# Patient Record
Sex: FEMALE | Race: WHITE | NOT HISPANIC OR LATINO | Employment: FULL TIME | URBAN - METROPOLITAN AREA
[De-identification: names, ages, dates, MRNs, and addresses within clinical notes are randomized per-mention and may not be internally consistent; named-entity substitution may affect disease eponyms.]

---

## 2017-01-04 ENCOUNTER — PATIENT MESSAGE (OUTPATIENT)
Dept: NEUROLOGY | Facility: CLINIC | Age: 37
End: 2017-01-04

## 2017-01-06 ENCOUNTER — OFFICE VISIT (OUTPATIENT)
Dept: NEUROLOGY | Facility: CLINIC | Age: 37
End: 2017-01-06
Payer: COMMERCIAL

## 2017-01-06 VITALS
WEIGHT: 139.75 LBS | HEART RATE: 60 BPM | BODY MASS INDEX: 28.17 KG/M2 | HEIGHT: 59 IN | DIASTOLIC BLOOD PRESSURE: 60 MMHG | SYSTOLIC BLOOD PRESSURE: 100 MMHG

## 2017-01-06 DIAGNOSIS — G43.009 MIGRAINE WITHOUT AURA AND WITHOUT STATUS MIGRAINOSUS, NOT INTRACTABLE: ICD-10-CM

## 2017-01-06 PROCEDURE — 99999 PR PBB SHADOW E&M-EST. PATIENT-LVL III: CPT | Mod: PBBFAC,,, | Performed by: NEUROMUSCULOSKELETAL MEDICINE & OMM

## 2017-01-06 PROCEDURE — 1159F MED LIST DOCD IN RCRD: CPT | Mod: S$GLB,,, | Performed by: NEUROMUSCULOSKELETAL MEDICINE & OMM

## 2017-01-06 PROCEDURE — 99203 OFFICE O/P NEW LOW 30 MIN: CPT | Mod: S$GLB,,, | Performed by: NEUROMUSCULOSKELETAL MEDICINE & OMM

## 2017-01-06 RX ORDER — SUMATRIPTAN SUCCINATE AND NAPROXEN SODIUM 85; 500 MG/1; MG/1
TABLET, FILM COATED ORAL
Refills: 3 | COMMUNITY
Start: 2016-12-13 | End: 2017-10-03

## 2017-01-06 RX ORDER — SUMATRIPTAN SUCCINATE 100 MG/1
100 TABLET ORAL
Qty: 10 TABLET | Refills: 3 | Status: SHIPPED | OUTPATIENT
Start: 2017-01-06 | End: 2017-09-12 | Stop reason: SDUPTHER

## 2017-01-06 RX ORDER — SUMATRIPTAN SUCCINATE 100 MG/1
100 TABLET ORAL
Qty: 10 TABLET | Refills: 3 | Status: SHIPPED | OUTPATIENT
Start: 2017-01-06 | End: 2017-01-06 | Stop reason: SDUPTHER

## 2017-01-06 NOTE — PROGRESS NOTES
History: Patient presents for follow-up for her migraine headaches.  Insurance will no longer cover Treximet, so she would like to have sumatriptan prescription and be able to add Aleve to it.  She typically takes half tablet of the Treximet.  We will give her the sumatriptan 100 mg which she can take half a tablet of in 1-1-1/2 Aleve with it.  Patient had been pregnant with no headaches for a while.  She has had increased frequency in the last 6 months with 2-3 headaches per month with good response to the Treximet.  She had tried that Cephaly  device but complains she doesn't have time to 2 minutes with a 2-year-old.    Previous note: 10-3-13: This is a 33-year-old white female who presents for followup for her migraine headaches. Patient was previously seen by Dr. Nicolle Chatman who is no longer here. Patient describes a 20 year history of migraine headaches. She describes right or left temporal pain radiating to the hand eye causing aching/deep pain in the eye radiating into the neck. Headache intensity is 5-6/10 associated with nausea, photophobia, sonophobia and lasting all day. The patient has no aura. She generally takes a quarter tablet of Treximet with relief in 30-60 minutes. She takes Cymbalta 30 mg and nortriptyline 25 mg h.s. for migraine prevention. Overall she has been doing quite well with this medication regimen and is here predominantly to establish with a new neurologist.  Gen. Appearance: Well-developed with no obvious deformities  Carotid arteries symmetrical pulses  Peripheral vascular shows symmetrical pulses with no obvious edema or tenderness  Neurological Exam:  Mental status-alert and oriented to person, place, and time; attention span and concentration is good. Fund of knowledge-patient is aware of current events and able to give detailed history of the current problem.recent and remote memory seems intact. Language function is normal with no evidence of aphasia     Cranial nerves:Visual  acuity to hand chart -normal; visual fields to confrontation normal;pupils were equal and reactive to light ; funduscopic examination was normal with sharp disc margins. external ocular movements were full with no nystagmus. Facial sensation to pinprick and corneal reflexes intact; Facial muscles were symmetrical. Hearing is unimpaired symmetrical finger rub; Tongue and palate movements were normal with swallowing unimpaired. Shoulder shrug was intact with good strength Speech was normal     Motor examination: Upper and Lower extremities - Normal and symmetrical;muscle tone was normal ; right-handed  Sensory examination:Upper and lower extremities - Pinprick and soft touch were normal and symmetrical. Vibration sense was normal at the toes for age 15-20 seconds  Deep tendon reflexes were 1-2+ symmetrical. Both plantar responses were flexor  Cerebellar examination upper: Normal finger to nose and rapid alternating movements  Gait: Steady with no ataxia; heel and toe walk normal  Romberg test: negative Tandem gait: Normal   Involuntary movements: Negative  Cervical examination: Full range of motion with no pain Cervical tenderness:negative  Lumbar examination: Low back tenderness-negative Sciatic notch tenderness-negative Straight leg raising test-negative     Impression:Migraine headache without aura  Recommendations/plan: Half tablet   Sumatriptan 100 mg +1 1/2 Aleve for headache.  Follow-up one year

## 2017-03-06 ENCOUNTER — OFFICE VISIT (OUTPATIENT)
Dept: OBSTETRICS AND GYNECOLOGY | Facility: CLINIC | Age: 37
End: 2017-03-06
Attending: OBSTETRICS & GYNECOLOGY
Payer: COMMERCIAL

## 2017-03-06 VITALS
WEIGHT: 138.44 LBS | SYSTOLIC BLOOD PRESSURE: 110 MMHG | BODY MASS INDEX: 27.91 KG/M2 | HEIGHT: 59 IN | DIASTOLIC BLOOD PRESSURE: 70 MMHG

## 2017-03-06 DIAGNOSIS — N76.0 ACUTE VAGINITIS: Primary | ICD-10-CM

## 2017-03-06 PROCEDURE — 99213 OFFICE O/P EST LOW 20 MIN: CPT | Mod: S$GLB,,, | Performed by: OBSTETRICS & GYNECOLOGY

## 2017-03-06 PROCEDURE — 1160F RVW MEDS BY RX/DR IN RCRD: CPT | Mod: S$GLB,,, | Performed by: OBSTETRICS & GYNECOLOGY

## 2017-03-06 PROCEDURE — 99999 PR PBB SHADOW E&M-EST. PATIENT-LVL II: CPT | Mod: PBBFAC,,, | Performed by: OBSTETRICS & GYNECOLOGY

## 2017-03-06 RX ORDER — ACETAMINOPHEN AND CODEINE PHOSPHATE 120; 12 MG/5ML; MG/5ML
1 SOLUTION ORAL DAILY
Qty: 90 TABLET | Refills: 1 | Status: SHIPPED | OUTPATIENT
Start: 2017-03-06 | End: 2017-06-28 | Stop reason: SDUPTHER

## 2017-03-06 RX ORDER — TERCONAZOLE 8 MG/G
1 CREAM VAGINAL NIGHTLY
Qty: 20 G | Refills: 0 | Status: SHIPPED | OUTPATIENT
Start: 2017-03-06 | End: 2017-03-13

## 2017-03-06 NOTE — PROGRESS NOTES
SUBJECTIVE:   36 y.o. female   for vaginal irritation. Patient's last menstrual period was 2017 (approximate)..  Reports vaginal itching started 2 days ago.  Has not yet used any OTC tx to manage.  Has had yeast and BV in the past.  Sexually active.  Uses Micronor for contraception.  Declines STD screen.          Past Medical History:   Diagnosis Date    Depression     Head ache     Kidney stones     Menarche     Age of onset 12    Sore throat      Past Surgical History:   Procedure Laterality Date    KIDNEY STONE SURGERY      WISDOM TOOTH EXTRACTION       Social History     Social History    Marital status: Single     Spouse name: N/A    Number of children: N/A    Years of education: N/A     Occupational History    Not on file.     Social History Main Topics    Smoking status: Never Smoker    Smokeless tobacco: Never Used    Alcohol use Yes      Comment: Rare    Drug use: No    Sexual activity: Yes     Partners: Male     Birth control/ protection: OCP     Other Topics Concern    Not on file     Social History Narrative    Works as     Single mom    Daughter: Akosua     Family History   Problem Relation Age of Onset    Hyperlipidemia Mother     Hyperlipidemia Father     Breast cancer Paternal Grandmother      Dx > 50 years    Colon cancer Paternal Grandmother     Ovarian cancer Neg Hx     Cancer Neg Hx      OB History    Para Term  AB SAB TAB Ectopic Multiple Living   1 1 1      0 1      # Outcome Date GA Lbr Karlos/2nd Weight Sex Delivery Anes PTL Lv   1 Term 10/27/14 37w5d 07:00 / 00:25 3.005 kg (6 lb 10 oz) F Vag-Spont None N Y            Current Outpatient Prescriptions   Medication Sig Dispense Refill    norethindrone (MICRONOR) 0.35 mg tablet Take 1 tablet (0.35 mg total) by mouth once daily. 90 tablet 1    PRENATAL VITS W-CA,FE,FA,<1MG, (PRENATAL VITAMIN ORAL) Take by mouth.      sertraline (ZOLOFT) 100 MG tablet Take 1 tablet by mouth once  "a day 30 tablet 3    TREXIMET  mg Tab TAKE 1 TABLET BY MOUTH ONCE AS NEEDED.  3    sumatriptan (IMITREX) 100 MG tablet Take 1 tablet (100 mg total) by mouth every 2 (two) hours as needed for Migraine. 10 tablet 3    terconazole (TERAZOL 3) 0.8 % vaginal cream Place 1 applicator vaginally every evening. 20 g 0    [DISCONTINUED] alprazolam (XANAX) 0.25 MG tablet Take 1 tablet (0.25 mg total) by mouth nightly as needed. 20 tablet 0     No current facility-administered medications for this visit.      Allergies: Percocet [oxycodone-acetaminophen]     ROS:  Constitutional: no weight loss, weight gain, fever, fatigue  Eyes:  No vision changes, glasses/contacts  ENT/Mouth: No ulcers, sinus problems, ears ringing, headache  Cardiovascular: No inability to lie flat, chest pain, exercise intolerance, swelling, heart palpitations  Respiratory: No wheezing, coughing blood, shortness of breath, or cough  Gastrointestinal: No diarrhea, bloody stool, nausea/vomiting, constipation, gas, hemorrhoids  Genitourinary: No blood in urine, painful urination, urgency of urination, frequency of urination, incomplete emptying, incontinence, abnormal bleeding, painful periods, heavy periods, vaginal discharge, vaginal odor, painful intercourse, sexual problems, bleeding after intercourse.  Musculoskeletal: No muscle weakness  Skin/Breast: No painful breasts, nipple discharge, masses, rash, ulcers  Neurological: No passing out, seizures, numbness, headache  Endocrine: No diabetes, hypothyroid, hyperthyroid, hot flashes, hair loss, abnormal hair growth, ance  Psychiatric: No depression, crying  Hematologic: No bruises, bleeding, swollen lymph nodes, anemia.      OBJECTIVE:   The patient appears well, alert, oriented x 3, in no distress.  /70 (BP Location: Left arm, Patient Position: Sitting, BP Method: Manual)  Ht 4' 11" (1.499 m)  Wt 62.8 kg (138 lb 7.2 oz)  LMP 02/06/2017 (Approximate)  Breastfeeding? Yes  BMI 27.96 " kg/m2  ABDOMEN: no hernias, masses, or hepatosplenomegaly  GENITALIA: normal external genitalia, +erythema, + discharge  URETHRA: normal urethra, normal urethral meatus  VAGINA: Normal  CERVIX: no lesions or cervical motion tenderness  UTERUS: normal size, contour, position, consistency, mobility, non-tender  ADNEXA: no mass, fullness, tenderness      ASSESSMENT:   1. Acute vaginitis         PLAN:   Orders Placed This Encounter    terconazole (TERAZOL 3) 0.8 % vaginal cream    norethindrone (MICRONOR) 0.35 mg tablet     Discussed vaginitis, presumed candida, and mgt with Terazol  Return to clinic prn

## 2017-03-06 NOTE — MR AVS SNAPSHOT
"    [facility] [address]  [address] [facility] [address]  Phone: [phone]                  Kelly Thomason   3/6/2017 10:45 AM   Office Visit    Description:  Female : 1980   Provider:  Sandi Short MD   Department:  [facility] [address]                To Do List           Goals (5 Years of Data)     None      [facility]     [facility] Care [facility] -  Assistance  Registered nurses in the [facility] provide clinical advisement, health education, appointment booking, and other advisory services.  Call for this free service at [phone].             Medications           Message regarding Medications     Verify the changes and/or additions to your medication regime listed below are the same as discussed with your clinician today.  If any of these changes or additions are incorrect, please notify your healthcare provider.             Verify that the below list of medications is an accurate representation of the medications you are currently taking.  If none reported, the list may be blank. If incorrect, please contact your healthcare provider. Carry this list with you in case of emergency.           Current Medications     norethindrone (MICRONOR) 0.35 mg tablet Take 1 tablet (0.35 mg total) by mouth once daily.    PRENATAL VITS W-CA,FE,FA,<1MG, (PRENATAL VITAMIN ORAL) Take by mouth.    sertraline (ZOLOFT) 100 MG tablet Take 1 tablet by mouth once a day    TREXIMET  mg Tab TAKE 1 TABLET BY MOUTH ONCE AS NEEDED.    sumatriptan (IMITREX) 100 MG tablet Take 1 tablet (100 mg total) by mouth every 2 (two) hours as needed for Migraine.           Clinical Reference Information           Your Vitals Were     BP Height Weight Last Period BMI    110/70 (BP Location: Left arm, Patient Position: Sitting, BP Method: Manual) 4' 11" (1.499 m) 62.8 kg (138 lb 7.2 oz) 2017 (Approximate) 27.96 kg/m2      Blood Pressure          Most Recent Value    " BP  110/70      Allergies as of 3/6/2017     Percocet [Oxycodone-acetaminophen]      Immunizations Administered on Date of Encounter - 3/6/2017     None      Language Assistance Services     ATTENTION: Language assistance services are available, free of charge. Please call [phone].      ATENCIÓN: Si habla arpita, tiene a perez disposición servicios gratuitos de asistencia lingüística. Llame al [phone].     CHÚ Ý: N?u b?n nói Ti?ng Vi?t, có các d?ch v? h? tr? ngôn ng? mi?n phí dành cho b?n. G?i s? [phone].         [facility] [address] complies with applicable Federal civil rights laws and does not discriminate on the basis of race, color, national origin, age, disability, or sex.

## 2017-06-28 RX ORDER — ACETAMINOPHEN AND CODEINE PHOSPHATE 120; 12 MG/5ML; MG/5ML
SOLUTION ORAL
Qty: 84 TABLET | Refills: 0 | Status: SHIPPED | OUTPATIENT
Start: 2017-06-28 | End: 2017-09-22 | Stop reason: SDUPTHER

## 2017-07-16 DIAGNOSIS — F41.9 ANXIETY: Primary | ICD-10-CM

## 2017-07-17 RX ORDER — SERTRALINE HYDROCHLORIDE 100 MG/1
TABLET, FILM COATED ORAL
Qty: 30 TABLET | Refills: 12 | Status: SHIPPED | OUTPATIENT
Start: 2017-07-17 | End: 2018-07-24 | Stop reason: SDUPTHER

## 2017-09-08 ENCOUNTER — TELEPHONE (OUTPATIENT)
Dept: INTERNAL MEDICINE | Facility: CLINIC | Age: 37
End: 2017-09-08

## 2017-09-08 NOTE — TELEPHONE ENCOUNTER
Spoke to pt informed pt that unfortunately no appt are available with Dr. Hurst today asked pt if she will like to be seen by another provider informed me no that she will wait for an cancellation or wait till her appt on 9/15

## 2017-09-08 NOTE — TELEPHONE ENCOUNTER
----- Message from Carri Galarza sent at 9/8/2017  9:20 AM CDT -----  Contact: self  Patient states has itches bumps on arm, leg, stomach patient has appointment for 9/15/2017   Pt would like appointment for today or Monday   Please call pt at [phone]

## 2017-09-14 RX ORDER — SUMATRIPTAN SUCCINATE 100 MG/1
TABLET ORAL
Qty: 10 TABLET | Refills: 3 | Status: SHIPPED | OUTPATIENT
Start: 2017-09-14 | End: 2018-03-14 | Stop reason: SDUPTHER

## 2017-09-18 RX ORDER — NORETHINDRONE 0.35 MG
KIT ORAL
Qty: 84 TABLET | OUTPATIENT
Start: 2017-09-18

## 2017-09-22 RX ORDER — NORETHINDRONE 0.35 MG
KIT ORAL
Qty: 84 TABLET | Refills: 0 | Status: SHIPPED | OUTPATIENT
Start: 2017-09-22 | End: 2017-11-06

## 2017-10-03 ENCOUNTER — OFFICE VISIT (OUTPATIENT)
Dept: INTERNAL MEDICINE | Facility: CLINIC | Age: 37
End: 2017-10-03
Payer: COMMERCIAL

## 2017-10-03 ENCOUNTER — PATIENT MESSAGE (OUTPATIENT)
Dept: INTERNAL MEDICINE | Facility: CLINIC | Age: 37
End: 2017-10-03

## 2017-10-03 VITALS
DIASTOLIC BLOOD PRESSURE: 82 MMHG | SYSTOLIC BLOOD PRESSURE: 118 MMHG | HEART RATE: 86 BPM | HEIGHT: 59 IN | WEIGHT: 147.06 LBS | BODY MASS INDEX: 29.65 KG/M2

## 2017-10-03 DIAGNOSIS — L50.9 URTICARIA: Primary | ICD-10-CM

## 2017-10-03 PROCEDURE — 99213 OFFICE O/P EST LOW 20 MIN: CPT | Mod: S$GLB,,, | Performed by: PHYSICIAN ASSISTANT

## 2017-10-03 PROCEDURE — 99999 PR PBB SHADOW E&M-EST. PATIENT-LVL IV: CPT | Mod: PBBFAC,,, | Performed by: PHYSICIAN ASSISTANT

## 2017-10-03 RX ORDER — CETIRIZINE HYDROCHLORIDE 10 MG/1
10 TABLET ORAL DAILY
Refills: 0 | COMMUNITY
Start: 2017-10-03 | End: 2018-08-27

## 2017-10-03 NOTE — PROGRESS NOTES
"Subjective:       Patient ID: Kelly Thomason is a 37 y.o. female.        Chief Complaint: Urticaria (x1mnth off in on itchy)    Kelly Thomason is an established patient of Christiano Hurst MD here today for urgent care visit.    Rash-raised "whelps" different areas of body that vary in location and are very pruritic.  The lesions resolve within 24 hours.  Does not currently have any lesions.  This started about a month ago.  She applies cortisone cream, calamine lotion, and benadryl.  The only change she can note is that she recently stopped breastfeeding.  Stable on Zoloft for anxiety/depression.  On progesterone only contraceptive.  Occasionally takes Imitrex.  No chest pain, shortness of breath, lip swelling.    Answers for HPI/ROS submitted by the patient on 9/27/2017   Rash  Chronicity: new  Onset: more than 1 month ago  Progression since onset: waxing and waning  Affected locations: diffuse  Characteristics: itchiness  Exposed to: nothing  anorexia: No  cough: No  diarrhea: No  eye pain: No  facial edema: No  fatigue: No  fever: No  joint pain: No  nail changes: No  rhinorrhea: No  shortness of breath: No  sore throat: No  vomiting: No  Treatments tried: anti-itch cream, antihistamine, cold compress, moisturizer  Improvement on treatment: mild  asthma: No  allergies: No  eczema: No  varicella: Yes        Rash   This is a new problem. The current episode started more than 1 month ago. The problem has been waxing and waning since onset. The rash isdiffuse. The rash is characterized by itchiness. She was exposed to nothing. Pertinent negatives include no anorexia, congestion, cough, diarrhea, eye pain, facial edema, fatigue, fever, joint pain, nail changes, rhinorrhea, shortness of breath, sore throat or vomiting. Past treatments include anti-itch cream, antihistamine, cold compress and moisturizer. The treatment provided mild relief. Her past medical history is significant for varicella. There is no history " of allergies, asthma or eczema.        Review of Systems   Constitutional: Negative for chills, diaphoresis, fatigue and fever.   HENT: Negative for congestion, rhinorrhea and sore throat.    Eyes: Negative for pain and visual disturbance.   Respiratory: Negative for cough, chest tightness and shortness of breath.    Cardiovascular: Negative for chest pain, palpitations and leg swelling.   Gastrointestinal: Negative for abdominal pain, anorexia, blood in stool, constipation, diarrhea, nausea and vomiting.   Genitourinary: Negative for dysuria, frequency, hematuria and urgency.   Musculoskeletal: Negative for arthralgias, back pain and joint pain.   Skin: Positive for rash. Negative for nail changes.   Neurological: Negative for dizziness, syncope, weakness and headaches.   Psychiatric/Behavioral: Negative for dysphoric mood and sleep disturbance. The patient is not nervous/anxious.        Objective:      Physical Exam   Constitutional: She appears well-developed and well-nourished. No distress.   HENT:   Head: Normocephalic and atraumatic.   Right Ear: Tympanic membrane and external ear normal.   Left Ear: Tympanic membrane and external ear normal.   Nose: Nose normal.   Mouth/Throat: Oropharynx is clear and moist.   Eyes: Conjunctivae are normal. Pupils are equal, round, and reactive to light.   Cardiovascular: Normal rate, regular rhythm and normal heart sounds.  Exam reveals no gallop.    No murmur heard.  Pulmonary/Chest: Effort normal and breath sounds normal. No respiratory distress.   Abdominal: Soft. Normal appearance. There is no tenderness. There is no rebound, no guarding and no CVA tenderness.   Musculoskeletal: She exhibits no edema.   Neurological: She is alert.   Skin: Skin is warm and dry. She is not diaphoretic.   Psychiatric: She has a normal mood and affect.   Nursing note and vitals reviewed.      Assessment:       1. Urticaria        Plan:        was seen today for urticaria.    Diagnoses  "and all orders for this visit:    Urticaria  -     Ambulatory consult to Allergy  -     cetirizine (ZYRTEC) 10 MG tablet; Take 1 tablet (10 mg total) by mouth once daily.    No rash visible today.  Sounds consistent with hives.  Zyrtec 10 mg nightly and consult to Dr. Choi.    Pt has been given instructions populated from [employer] database and has verbalized understanding of the after visit summary and information contained wherein.    Follow up with a primary care provider. May go to ER for acute shortness of breath, lightheadedness, fever, or any other emergent complaints or changes in condition.    "This note will be shared with the patient"    Future Appointments  Date Time Provider Department Center   10/20/2017 9:00 AM Sandi Short MD [facility] [place]   11/3/2017 8:20 AM TONY Choi III, MD [facility]               "

## 2017-10-03 NOTE — PATIENT INSTRUCTIONS
Hives (Adult)  Hives are pink or red bumps on the skin. These bumps are also known as wheals. The bumps can itch, burn, or sting. Hives can occur anywhere on the body. They vary in size and shape and can form in clusters. Individual hives can appear and go away quickly. New hives may develop as old ones fade. Hives are common and usually harmless. Occasionally hives are a sign of a serious allergy.  Hives are often caused by an allergic reaction. It may be an allergic reaction to foods such as fruit, shellfish, chocolate, nuts, or tomatoes. It may be a reaction to pollens, animal fur, or mold spores. Medicines, chemicals, and insect bites can also cause hives. And hives can be caused by hot sun or cold air. The cause of hives can be difficult to find.  You may be given medicines to relieve swelling and itching. Follow all instructions when using these medicines. The hives will usually fade in a few days, but can last up to 2 weeks.  Home care  Follow these tips:  · Try to find the cause of the hives and eliminate it. Discuss possible causes with your healthcare provider. Future reactions to the same allergen may be worse.  · Dont scratch the hives. Scratching will delay healing. To reduce itching, apply cool, wet compresses to the skin.  · Dress in soft, loose cotton clothing.  · Dont bathe in hot water. This can make the itching worse.  · Apply an ice pack or cool pack wrapped in a thin towel to your skin. This will help reduce redness and itching. But if your hives were caused by exposure to cold, then do not apply more cold to them.  · You may use over-the counter antihistamines to reduce itching. Some older antihistamines, such as diphenhydramine and chlorpheniramine, are inexpensive. But they need to be taken often and may make you sleepy. They are best used at bedtime. Dont use diphenhydramine if you have glaucoma or have trouble urinating because of an enlarged prostate. Newer antihistamines, such as  loratadine, cetirizine, and fexofenadine, are generally more expensive. But they tend to have fewer side effects, such as drowsiness. They can be taken less often.  · Another type of antihistamine is used to treat heartburn. This type includes ranitidine, nizatidine, famotidine, and cimetidine. These are sometimes used along with the above antihistamines if a single medicine is not working.  Follow-up care  Follow up with your healthcare provider if your symptoms don't get better in 2 days. Ask your provider about allergy testing if you have had a severe reaction, or have had several episodes of hives. He or she can use the allergy testing to find out what you are allergic to.  When to seek medical advice  Call your healthcare provider right away if any of these occur:  · Fever of 100.4°F (38.0°C) or higher, or as directed by your healthcare provider  · Redness, swelling, or pain  · Foul-smelling fluid coming from the rash  Call 911  Call 911 if any of the following occur:  · Swelling of the face, throat, or tongue  · Trouble breathing or swallowing  · Dizziness, weakness, or fainting  Date Last Reviewed: 9/1/2016  © 8402-2675 [employer]. [address] [place] [address], [place] [address]. All rights reserved. This information is not intended as a substitute for professional medical care. Always follow your healthcare professional's instructions.      Dr. Choi

## 2017-10-26 ENCOUNTER — TELEPHONE (OUTPATIENT)
Dept: ALLERGY | Facility: CLINIC | Age: 37
End: 2017-10-26

## 2017-10-30 ENCOUNTER — OFFICE VISIT (OUTPATIENT)
Dept: ALLERGY | Facility: CLINIC | Age: 37
End: 2017-10-30
Payer: COMMERCIAL

## 2017-10-30 VITALS
HEART RATE: 96 BPM | BODY MASS INDEX: 30.58 KG/M2 | DIASTOLIC BLOOD PRESSURE: 88 MMHG | SYSTOLIC BLOOD PRESSURE: 130 MMHG | HEIGHT: 59 IN | WEIGHT: 151.69 LBS

## 2017-10-30 DIAGNOSIS — L50.1 IDIOPATHIC URTICARIA: Primary | ICD-10-CM

## 2017-10-30 DIAGNOSIS — J31.0 CHRONIC RHINITIS, UNSPECIFIED TYPE: ICD-10-CM

## 2017-10-30 PROCEDURE — 99244 OFF/OP CNSLTJ NEW/EST MOD 40: CPT | Mod: S$GLB,,, | Performed by: ALLERGY & IMMUNOLOGY

## 2017-10-30 PROCEDURE — 99999 PR PBB SHADOW E&M-EST. PATIENT-LVL III: CPT | Mod: PBBFAC,,, | Performed by: ALLERGY & IMMUNOLOGY

## 2017-10-30 RX ORDER — FERROUS SULFATE 325(65) MG
325 TABLET ORAL
COMMUNITY
End: 2018-06-04

## 2017-10-30 NOTE — PROGRESS NOTES
Kelly Thomason is referred by OMER Walker for a consult regarding urticaria. She is here alone.    About 3 months ago she started having urticarial lesions that are red, raised, and pruritic. She was having them daily but since beginning Zyrtec they have been well controlled. The lesions do not hurt or bruise.    She has had one episode of angioedema of her lips. She took an additional Zyrtec and this resolved.    There is no association with any food, contact, or ingestion.    She denies any history of thyroid disease. Her maternal grandmother has thyroid disease.    She was breast-feeding her 3-year-old daughter but discontinued this about the same time.    She does have mild chronic recurrent rhinitis. She denies any conjunctivitis. She denies any cough, wheezing, or shortness of breath. She denies any history of asthma.    She has had iron deficiency in the past. She does complain about increased fatigue and has recently started iron replacement. She was taking prenatal vitamins.    She does have a history of migraine headaches and takes Imitrex.    She is a  and works for the [employer].    [facility] PEQ ALLERGY QUESTIONNAIRE LONG 10/30/2017   Do you have symptoms in your head, eyes, ears, nose, or sinuses? No   Head or facial pain: Headaches, Sinus pressure   Please describe your head and/or facial pain, if applicable.  ear, jaw pressure; eye & front head pain.   Eyes: Itching   When was your eye surgery (if applicable)?  10 years ago   Do you have difficulty wearing contacts, if applicable?  No   Which kind of eye drops do you use, if applicable? Systane   Ears: Pressure   Nose: No symptoms   Throat: No symptoms   Sinuses: No symptoms   Do you have symptoms in your lungs?  No   Lungs: No symptoms   Have you ever has a tuberculosis skin test?  Unknown   Have you ever had a lung-function test? No   Have you had a flu shot this year? Yes   When was your  flu shot, if applicable? Early October   Have you had the pneumonia vaccine?  No   Do you have any known problems with your immune system? No   Do you suspect you may have problems with your immune system? No   Do you have frequent infections? No   Do you have skin symptoms? Yes   Skin: Itching, Hives, Redness, Rash, Swelling   When did your hives and/or swelling first begin? About 3 months ago   Please note the frequency of hives and/or swelling in days, weeks OR months. Every day for a few hours until I started Zrytec daily   Body location most commonly affected by hives: All over   Body location most commonly affected by swelling: Other   If you chose other, please list the body part that swells most commonly. mid section; thighs   What are the substances, contacts, activity, foods, or drugs, which you think are related to hives or swelling? No clue   Which medications have been helpful in controlling hives or swelling? Zyrtec; Aveeno cream   Are you taking this medication regularly? Yes   Have you associated the hives or swelling with any of the following? Not applicable   Have you had any other associated symptoms with the hives or swelling such as: Not applicable   When did these symptoms first occur? About 3 months ago   Are they getting worse or better? Better   How often do these symptoms occur? Daily for a few hours when not taking Zyrtec   When do these symptoms occur? Randomly throughout day   Do they occur year round? No   If there is any seasonal variation in your symptoms, when are they worse? No   Is there a particular time of the day or night when the symptoms are worse? No   Is there anything you have identified, which can cause symptoms or make them worse? (such as dust, grass, plant or animal products, mold, heat, cold, strong odors, exercise) No   Is there anything you have identified, which can make symptoms better?  Zyrtec   What medications have you tried in the past to help control these  symptoms?  Zyrtec, Lanacane, Aveeno Cream   Please list all the vitamins or herbal medications you are taking. Ferrous Sulfate (just started taking last week)   Please list all the other medications you are taking, including over-the-counter medications. Zoloft, Birth Control, Imitrex & Aleve (as needed), Tylenol or Excedrin (as needed); Zyrtec   Have you ever seen an allergist for these symptoms? No   Have you ever had skin tests? No   Have you ever had any other type of allergy testing? No   Have you ever had allergy shots? No   Do you have food allergies? No   Do you have drug allergies? Yes   Please list the drug(s), type of reaction(s), last date of reaction(s), and if you have used the medication since discovering you are allergic.  Percocet; probably more of an adverse reaction than true allergy   Do you have insect allergies? Yes   Please list the insect](s), type of reaction(s), last date of reaction(s), and whether or not you went to the emergency as a result.  Mosquito bites make me whelp up    Do you have latex allergies? No   Constitution: No symptoms   Cardiovascular: No symptoms   Gastrointestinal: No symptoms   Genital/ urinary No symptoms   Musculoskeletal: No symptoms   Endocrine: No symptoms   Hematologic: No symptoms   Please note which family members have allergies or asthma and specify which they have. Grandmother gets hives when she takes Percocet or Tramadol   How long have you lived at your current address? 5 years   Has your residence ever had water or flood damage? No   Is there any evidence of mold in the house? No   Does your house have: Central air conditioning, Electric heat, Humidifier   Does your bedroom have: Carpeting, Ceiling fan, Venetian blinds, Stuffed animals   What type of pillow do you have, for example feather, foam and fiberfill?  Feather and foam filled.   Do you have pets? Yes   Please list the type of pet(s), how many, how long you have had the pet(s), whether or not  the pet(s) are living inside or outside, and whether the pet(s) aggravate your symptoms.  6 cats; most over 13 years, indoor/outdoor   Does anyone in the house smoke? No   What is your occupation?    Do any of the symptoms increase at school or work? Please specify which symptoms, if applicable.  no   Do you suspect anything at work or school, which may be causing your symptoms? If so, please elaborate.  No   Is there anything else that might be important for the doctor to know?  No   Did you find this questionnaire helpful in addressing your symptoms?  No     Physical Examination:  General: Well-developed, well-nourished, no acute distress.  Head: No sinus tenderness.  Eyes: Conjunctivae:  No bulbar or palpebral conjunctival injection.  Ears: EAC's clear.  TM's clear.  No pre-auricular nodes.  Nose: Nasal Mucosa:  Pink.  Septum: No apparent deviation.  Turbinates:  No significant edema.  Polyps/Mass:  None visible.  Teeth/Gums:  No bleeding noted.  Oropharynx: No exudates.  Neck: Supple without thyromegaly. No cervical lymphadenopathy.    Respiratory/Chest: Effort: Good.  Auscultation:  Clear bilaterally.  Cardiovascular:  No murmur, rubs, or gallop heard.   GI:  Non-tender.  No masses.  No organomegaly.  Extremities:  No swelling.  No cyanosis, clubbing, or edema.  Skin: Good turgor.  No urticaria or angioedema.  Neuro/Psych: Oriented x 3.    Pictures reviewed on her cell phone on 10/30/2017 and they are consistent with urticaria and angioedema.    Assessment:  1. Urticaria and angioedema, probably idiopathic.  2. Mild rhinitis, consider allergic component.  3. History of iron deficiency.  4. Migraine headaches.    Recommendations:  1. Laboratory as ordered.  2. Continue Zyrtec daily. May take 2 if needed.  3. Return to clinic in 1 week.

## 2017-10-30 NOTE — LETTER
October 30, 2017      Susanne Walker PA-C  [address]  [facility] [address]           [facility]  [address]  [facility] [address]  Phone: [phone]  Fax: [phone]          Patient: Kelly Thomason   MR Number: [id number]   YOB: 1980   Date of Visit: 10/30/2017       Dear Susanne Walker:    Thank you for referring Kelly Thomason to me for evaluation. Attached you will find relevant portions of my assessment and plan of care.    If you have questions, please do not hesitate to call me. I look forward to following Kelly Thomason along with you.    Sincerely,    TONY Choi III, MD    Enclosure  CC:  No Recipients    If you would like to receive this communication electronically, please contact [email or web address] or [phone] to request more information on [facility] [employer] access.    For providers and/or their staff who would like to refer a patient to [facility], please contact us through our one-stop-shop provider referral line, [facility], at [phone].    If you feel you have received this communication in error or would no longer like to receive these types of communications, please e-mail [email or web address]

## 2017-11-06 ENCOUNTER — OFFICE VISIT (OUTPATIENT)
Dept: ALLERGY | Facility: CLINIC | Age: 37
End: 2017-11-06
Payer: COMMERCIAL

## 2017-11-06 VITALS
DIASTOLIC BLOOD PRESSURE: 82 MMHG | SYSTOLIC BLOOD PRESSURE: 108 MMHG | WEIGHT: 149.25 LBS | HEIGHT: 59 IN | BODY MASS INDEX: 30.09 KG/M2 | HEART RATE: 75 BPM | OXYGEN SATURATION: 99 %

## 2017-11-06 DIAGNOSIS — J31.0 CHRONIC RHINITIS, UNSPECIFIED TYPE: ICD-10-CM

## 2017-11-06 DIAGNOSIS — T78.3XXD ANGIOEDEMA, SUBSEQUENT ENCOUNTER: Primary | ICD-10-CM

## 2017-11-06 DIAGNOSIS — L50.1 IDIOPATHIC URTICARIA: ICD-10-CM

## 2017-11-06 PROCEDURE — 99999 PR PBB SHADOW E&M-EST. PATIENT-LVL III: CPT | Mod: PBBFAC,,, | Performed by: ALLERGY & IMMUNOLOGY

## 2017-11-06 PROCEDURE — 99214 OFFICE O/P EST MOD 30 MIN: CPT | Mod: S$GLB,,, | Performed by: ALLERGY & IMMUNOLOGY

## 2017-11-06 NOTE — PROGRESS NOTES
Kelly Thomason returns to clinic today for continued evaluation of urticaria. She is here alone. She was last seen October 30, 2017.    She has only had a few episodes of urticaria since her last visit, she has been taking Zyrtec every day.    She has not had any angioedema.    There is no association with any food, contact, or ingestion.    She continues to have some mild rhinitis with sneezing and nasal congestion.    She has 6 cats that are indoors.    She denies any cough, wheezing, or shortness of breath.    [facility] PEQ ALLERGY QUESTIONNAIRE SHORT 11/4/2017   Are you taking any new medications since your last visit? No   Constitution: No symptoms   Head or facial pain: Headaches   Eyes: Itching, Tearing   Ears: No symptoms   Nose: No symptoms   Throat: No symptoms   Sinuses: No symptoms   Lungs: No symptoms   Skin: Itching, Hives   Cardiovascular: No symptoms   Gastrointestinal: Diarrhea   Genital/ urinary No symptoms   Musculoskeletal: No symptoms   Neurologic: No symptoms   Endocrine: No symptoms   Hematologic: No symptoms     Physical Examination:  General: Well-developed, well-nourished, no acute distress.  Skin: Good turgor.  No urticaria or angioedema.  Neuro/Psych: Oriented x 3.    Pictures reviewed on her cell phone on 10/30/2017 and they are consistent with urticaria and angioedema.    Laboratory 10/30/2017:  IgE level: Less than 35.  ImmunoCAP:  Class I: Cockroach.  TSH: 2.914.  Thyroid peroxidase antibody level: Less than 6.0.  Antithyroglobulin antibody level: Less than 4.0.  Anti-IgE receptor antibody level: Pending.  Vitamin B12: 412.  Serum tryptase: 5.6.  SPEP: Normal.  CBC: Normal.  CMP: Normal.  Serum ferritin: 82.  Iron: 110, TIBC 333.    Assessment:  1. Urticaria and angioedema, probably idiopathic, controlled.  2. Mild rhinitis, consider allergic component.  3. History of iron deficiency, resolved.  4. Migraine headaches.    Recommendations:  1. Continue Zyrtec daily.  2. May take 2 if  needed.  3. Consider skin testing off antihistamines if needed for rhinitis in the future.    Allergic mechanisms and treatment options were reviewed in detail. Urticaria and angioedema were reviewed. Handout was given.    25 minutes was spent with this patient, over half in counseling regarding allergic disease.

## 2017-11-20 ENCOUNTER — OFFICE VISIT (OUTPATIENT)
Dept: OBSTETRICS AND GYNECOLOGY | Facility: CLINIC | Age: 37
End: 2017-11-20
Attending: OBSTETRICS & GYNECOLOGY
Payer: COMMERCIAL

## 2017-11-20 VITALS
WEIGHT: 149.69 LBS | SYSTOLIC BLOOD PRESSURE: 100 MMHG | DIASTOLIC BLOOD PRESSURE: 60 MMHG | HEIGHT: 59 IN | BODY MASS INDEX: 30.18 KG/M2

## 2017-11-20 DIAGNOSIS — Z01.419 WELL FEMALE EXAM WITH ROUTINE GYNECOLOGICAL EXAM: Primary | ICD-10-CM

## 2017-11-20 PROCEDURE — 99999 PR PBB SHADOW E&M-EST. PATIENT-LVL III: CPT | Mod: PBBFAC,,, | Performed by: OBSTETRICS & GYNECOLOGY

## 2017-11-20 PROCEDURE — 99395 PREV VISIT EST AGE 18-39: CPT | Mod: S$GLB,,, | Performed by: OBSTETRICS & GYNECOLOGY

## 2017-11-20 RX ORDER — DESOGESTREL AND ETHINYL ESTRADIOL 21-5 (28)
1 KIT ORAL DAILY
Qty: 84 TABLET | Refills: 3 | Status: SHIPPED | OUTPATIENT
Start: 2017-11-20 | End: 2018-05-16

## 2017-11-20 RX ORDER — ACETAMINOPHEN AND CODEINE PHOSPHATE 120; 12 MG/5ML; MG/5ML
1 SOLUTION ORAL DAILY
Qty: 28 TABLET | Refills: 11 | Status: CANCELLED | OUTPATIENT
Start: 2017-11-20 | End: 2017-12-20

## 2017-11-20 NOTE — PROGRESS NOTES
SUBJECTIVE:   37 y.o. female   for routine gyn exam. Patient's last menstrual period was 2017 (approximate)..  She has no unusual complaints.  Stopped OCP's and desires OCP's with estrogen.         Past Medical History:   Diagnosis Date    Depression     Head ache     Hives     Kidney stones     Urticaria      Past Surgical History:   Procedure Laterality Date    KIDNEY STONE SURGERY      WISDOM TOOTH EXTRACTION       Social History     Social History    Marital status: Single     Spouse name: N/A    Number of children: N/A    Years of education: N/A     Occupational History    Not on file.     Social History Main Topics    Smoking status: Never Smoker    Smokeless tobacco: Never Used    Alcohol use Yes      Comment: Rare    Drug use: No    Sexual activity: Yes     Partners: Male     Birth control/ protection: None     Other Topics Concern    Not on file     Social History Narrative    Works as     Single mom    Daughter: Akosua     Family History   Problem Relation Age of Onset    Hyperlipidemia Mother     Hyperlipidemia Father     Breast cancer Paternal Grandmother      Dx > 50 years    Colon cancer Paternal Grandmother     Ovarian cancer Neg Hx     Cancer Neg Hx      OB History    Para Term  AB Living   1 1 1     1   SAB TAB Ectopic Multiple Live Births         0 1      # Outcome Date GA Lbr Karlos/2nd Weight Sex Delivery Anes PTL Lv   1 Term 10/27/14 37w5d 07:00 / 00:25 3.005 kg (6 lb 10 oz) F Vag-Spont None N JOSE RAFAEL            Current Outpatient Prescriptions   Medication Sig Dispense Refill    cetirizine (ZYRTEC) 10 MG tablet Take 1 tablet (10 mg total) by mouth once daily.  0    desog-e.estradiol/e.estradiol (KARIVA) 0.15-0.02 mgx21 /0.01 mg x 5 per tablet Take 1 tablet by mouth once daily. 84 tablet 3    ferrous sulfate 325 mg (65 mg iron) Tab tablet Take 325 mg by mouth daily with breakfast.      sertraline (ZOLOFT) 100 MG tablet Take 1  "tablet by mouth once a day 30 tablet 12    sumatriptan (IMITREX) 100 MG tablet Take 1 tablet by mouth  every 2 hours as needed for migraine(s) . Do not exceed 2 tablets per 24 hours. 10 tablet 3     No current facility-administered medications for this visit.      Allergies: Percocet [oxycodone-acetaminophen]     ROS:  Constitutional: no weight loss, weight gain, fever, fatigue  Eyes:  No vision changes, glasses/contacts  ENT/Mouth: No ulcers, sinus problems, ears ringing, headache  Cardiovascular: No inability to lie flat, chest pain, exercise intolerance, swelling, heart palpitations  Respiratory: No wheezing, coughing blood, shortness of breath, or cough  Gastrointestinal: No diarrhea, bloody stool, nausea/vomiting, constipation, gas, hemorrhoids  Genitourinary: No blood in urine, painful urination, urgency of urination, frequency of urination, incomplete emptying, incontinence, abnormal bleeding, painful periods, heavy periods, vaginal discharge, vaginal odor, painful intercourse, sexual problems, bleeding after intercourse.  Musculoskeletal: No muscle weakness  Skin/Breast: No painful breasts, nipple discharge, masses, rash, ulcers  Neurological: No passing out, seizures, numbness, headache  Endocrine: No diabetes, hypothyroid, hyperthyroid, hot flashes, hair loss, abnormal hair growth, ance  Psychiatric: No depression, crying  Hematologic: No bruises, bleeding, swollen lymph nodes, anemia.      OBJECTIVE:   The patient appears well, alert, oriented x 3, in no distress.  /60 (BP Location: Right arm, Patient Position: Sitting, BP Method: Medium (Manual))   Ht 4' 11" (1.499 m)   Wt 67.9 kg (149 lb 11.1 oz)   LMP 11/05/2017 (Approximate)   Breastfeeding? No   BMI 30.23 kg/m²   NECK: no thyromegaly, trachea midline  SKIN: no acne, striae, hirsutism  CHEST: CTAB  CV: RRR  BREAST EXAM: breasts appear normal, no suspicious masses, no skin or nipple changes or axillary nodes  ABDOMEN: no hernias, masses, or " hepatosplenomegaly  GENITALIA: normal external genitalia, no erythema, no discharge  URETHRA: normal urethra, normal urethral meatus  VAGINA: Normal  CERVIX: no lesions or cervical motion tenderness  UTERUS: normal size, contour, position, consistency, mobility, non-tender  ADNEXA: no mass, fullness, tenderness      ASSESSMENT:   1. Well female exam with routine gynecological exam         PLAN:   Orders Placed This Encounter    desog-e.estradiol/e.estradiol (KARIVA) 0.15-0.02 mgx21 /0.01 mg x 5 per tablet     Return to clinic in 1 year

## 2018-01-04 ENCOUNTER — OFFICE VISIT (OUTPATIENT)
Dept: URGENT CARE | Facility: CLINIC | Age: 38
End: 2018-01-04
Payer: COMMERCIAL

## 2018-01-04 VITALS
TEMPERATURE: 98 F | BODY MASS INDEX: 32.25 KG/M2 | HEIGHT: 59 IN | HEART RATE: 86 BPM | SYSTOLIC BLOOD PRESSURE: 134 MMHG | WEIGHT: 160 LBS | OXYGEN SATURATION: 98 % | RESPIRATION RATE: 18 BRPM | DIASTOLIC BLOOD PRESSURE: 91 MMHG

## 2018-01-04 DIAGNOSIS — K52.9 GASTROENTERITIS: Primary | ICD-10-CM

## 2018-01-04 DIAGNOSIS — R52 BODY ACHES: ICD-10-CM

## 2018-01-04 LAB
CTP QC/QA: YES
FLUAV AG NPH QL: NEGATIVE
FLUBV AG NPH QL: NEGATIVE

## 2018-01-04 PROCEDURE — 87804 INFLUENZA ASSAY W/OPTIC: CPT | Mod: QW,S$GLB,, | Performed by: FAMILY MEDICINE

## 2018-01-04 PROCEDURE — 99203 OFFICE O/P NEW LOW 30 MIN: CPT | Mod: S$GLB,,, | Performed by: FAMILY MEDICINE

## 2018-01-04 RX ORDER — DICYCLOMINE HYDROCHLORIDE 20 MG/1
20 TABLET ORAL
Qty: 20 TABLET | Refills: 0 | Status: SHIPPED | OUTPATIENT
Start: 2018-01-04 | End: 2018-01-09

## 2018-01-04 RX ORDER — ONDANSETRON 8 MG/1
8 TABLET, ORALLY DISINTEGRATING ORAL EVERY 8 HOURS PRN
Qty: 9 TABLET | Refills: 0 | Status: SHIPPED | OUTPATIENT
Start: 2018-01-04 | End: 2018-01-07

## 2018-01-04 NOTE — PATIENT INSTRUCTIONS

## 2018-01-04 NOTE — PROGRESS NOTES
"Subjective:       Patient ID: Kelly Thomason is a 37 y.o. female.    Vitals:  height is 4' 11" (1.499 m) and weight is 72.6 kg (160 lb). Her tympanic temperature is 98.2 °F (36.8 °C). Her blood pressure is 134/91 (abnormal) and her pulse is 86. Her respiration is 18 and oxygen saturation is 98%.     Chief Complaint: URI    URI    This is a new problem. The current episode started in the past 7 days. The problem has been unchanged. Associated symptoms include congestion, diarrhea, nausea, sinus pain and a sore throat. Pertinent negatives include no abdominal pain, chest pain, coughing, ear pain, headaches or wheezing. She has tried acetaminophen for the symptoms. The treatment provided mild relief.     Review of Systems   Constitution: Positive for chills and malaise/fatigue. Negative for fever.   HENT: Positive for congestion, sinus pain and sore throat. Negative for ear pain and hoarse voice.    Eyes: Negative for discharge and redness.   Cardiovascular: Negative for chest pain, dyspnea on exertion and leg swelling.   Respiratory: Negative for cough, shortness of breath, sputum production and wheezing.    Musculoskeletal: Positive for myalgias.   Gastrointestinal: Positive for diarrhea and nausea. Negative for abdominal pain.   Neurological: Negative for headaches.       Objective:      Physical Exam   Constitutional: She is oriented to person, place, and time. She appears well-developed and well-nourished.   HENT:   Head: Normocephalic and atraumatic.   Right Ear: External ear normal.   Left Ear: External ear normal.   Nose: Nose normal.   Mouth/Throat: Mucous membranes are normal.   Eyes: Conjunctivae and lids are normal.   Neck: Trachea normal and full passive range of motion without pain. Neck supple.   Cardiovascular: Normal rate, regular rhythm and normal heart sounds.    Pulmonary/Chest: Effort normal and breath sounds normal. No respiratory distress.   Abdominal: Soft. Normal appearance. She exhibits no " distension, no abdominal bruit, no pulsatile midline mass and no mass. Bowel sounds are increased. There is generalized tenderness.   Musculoskeletal: Normal range of motion. She exhibits no edema.   Neurological: She is alert and oriented to person, place, and time. She has normal strength.   Skin: Skin is warm, dry and intact. She is not diaphoretic. No pallor.   Psychiatric: She has a normal mood and affect. Her speech is normal and behavior is normal. Judgment and thought content normal. Cognition and memory are normal.   Nursing note and vitals reviewed.        Office Visit on 01/04/2018   Component Date Value Ref Range Status    Rapid Influenza A Ag 01/04/2018 Negative  Negative Final    Rapid Influenza B Ag 01/04/2018 Negative  Negative Final     Acceptable 01/04/2018 Yes   Final       Assessment:       1. Gastroenteritis    2. Body aches        Plan:         Gastroenteritis  -     ondansetron (ZOFRAN-ODT) 8 MG TbDL; Take 1 tablet (8 mg total) by mouth every 8 (eight) hours as needed.  Dispense: 9 tablet; Refill: 0  -     dicyclomine (BENTYL) 20 mg tablet; Take 1 tablet (20 mg total) by mouth 4 (four) times daily before meals and nightly.  Dispense: 20 tablet; Refill: 0    Body aches  -     POCT Influenza A/B      Patient Instructions     Viral Gastroenteritis (Adult)    Gastroenteritis is commonly called the stomach flu. It is most often caused by a virus that affects the stomach and intestinal tract and usually lasts from 2 to 7 days. Common viruses causing gastroenteritis include norovirus, rotavirus, and hepatitis A. Non-viral causes of gastroenteritis include bacteria, parasites, and toxins.  The danger from repeated vomiting or diarrhea is dehydration. This is the loss of too much fluid from the body. When this occurs, body fluids must be replaced. Antibiotics do not help with this illness because it is usually viral.Simple home treatment will be helpful.  Symptoms of viral  gastroenteritis may include:  · Watery, loose stools  · Stomach pain or abdominal cramps  · Fever and chills  · Nausea and vomiting  · Loss of bowel control  · Headache  Home care  Gastroenteritis is transmitted by contact with the stool or vomit of an infected person. This can occur from person to person or from contact with a contaminated surface.  Follow these guidelines when caring for yourself at home:  · If symptoms are severe, rest at home for the next 24 hours or until you are feeling better.  · Wash your hands with soap and water or use alcohol-based  to prevent the spread of infection. Wash your hands after touching anyone who is sick.  · Wash your hands or use alcohol-based  after using the toilet and before meals. Clean the toilet after each use.  Remember these tips when preparing food:  · People with diarrhea should not prepare or serve food to others. When preparing foods, wash your hands before and after.  · Wash your hands after using cutting boards, countertops, knives, or utensils that have been in contact with raw food.  · Keep uncooked meats away from cooked and ready-to-eat foods.  Medicine  You may use acetaminophen or NSAID medicines like ibuprofen or naproxen to control fever unless another medicine was given. If you have chronic liver or kidney disease, talk with your healthcare provider before using these medicines. Also talk with your provider if you've had a stomach ulcer or gastrointestinal bleeding. Don't give aspirin to anyone under 18 years of age who is ill with a fever. It may cause severe liver damage. Don't use NSAIDS is you are already taking one for another condition (like arthritis) or are on aspirin (such as for heart disease or after a stroke).  If medicine for vomiting or diarrhea are prescribed, take these only as directed. Do not take over-the-counter medicines for vomiting or diarrhea unless instructed by your healthcare provider.  Diet  Follow these  guidelines for food:  · Water and liquids are important so you don't get dehydrated. Drink a small amount at a time or suck on ice chips if you are vomiting.  · If you eat, avoid fatty, greasy, spicy, or fried foods.  · Don't eat dairy if you have diarrhea. This can make diarrhea worse.  · Avoid tobacco, alcohol, and caffeine which may worsen symptoms.  During the first 24 hours (the first full day), follow the diet below:  · Beverages. Sports drinks, soft drinks without caffeine, ginger ale, mineral water (plain or flavored), decaffeinated tea and coffee. If you are very dehydrated, sports drinks aren't a good choice. They have too much sugar and not enough electrolytes. In this case, commercially available products called oral rehydration solutions, are best.  · Soups. Eat clear broth, consommé, and bouillon.  · Desserts. Eat gelatin, popsicles, and fruit juice bars.  During the next 24 hours (the second day), you may add the following to the above:  · Hot cereal, plain toast, bread, rolls, and crackers  · Plain noodles, rice, mashed potatoes, chicken noodle or rice soup  · Unsweetened canned fruit (avoid pineapple), bananas  · Limit fat intake to less than 15 grams per day. Do this by avoiding margarine, butter, oils, mayonnaise, sauces, gravies, fried foods, peanut butter, meat, poultry, and fish.  · Limit fiber and avoid raw or cooked vegetables, fresh fruits (except bananas), and bran cereals.  · Limit caffeine and chocolate. Don't use spices or seasonings other than salt.  · Limit dairy products.  · Avoid alcohol.  During the next 24 hours:  · Gradually resume a normal diet as you feel better and your symptoms improve.  · If at any time it starts getting worse again, go back to clear liquids until you feel better.  Follow-up care  Follow up with your healthcare provider, or as advised. Call your provider if you don't get better within 24 hours or if diarrhea lasts more than a week. Also follow up if you are  unable to keep down liquids and get dehydrated. If a stool (diarrhea) sample was taken, call as directed for the results.  Call 911  Call 911 if any of these occur:  · Trouble breathing  · Chest pain  · Confused  · Severe drowsiness or trouble awakening  · Fainting or loss of consciousness  · Rapid heart rate  · Seizure  · Stiff neck  When to seek medical advice  Call your healthcare provider right away if any of these occur:  · Abdominal pain that gets worse  · Continued vomiting (unable to keep liquids down)  · Frequent diarrhea (more than 5 times a day)  · Blood in vomit or stool (black or red color)  · Dark urine, reduced urine output, or extreme thirst  · Weakness or dizziness  · Drowsiness  · Fever of 100.4°F (38°C) oral or higher that does not get better with fever medicine  · New rash  Date Last Reviewed: 1/3/2016  © 3908-6037 [employer]. [address] [place] [address], [place] [address]. All rights reserved. This information is not intended as a substitute for professional medical care. Always follow your healthcare professional's instructions.

## 2018-01-26 ENCOUNTER — NURSE TRIAGE (OUTPATIENT)
Dept: ADMINISTRATIVE | Facility: CLINIC | Age: 38
End: 2018-01-26

## 2018-01-26 ENCOUNTER — PATIENT MESSAGE (OUTPATIENT)
Dept: INTERNAL MEDICINE | Facility: CLINIC | Age: 38
End: 2018-01-26

## 2018-01-26 ENCOUNTER — OFFICE VISIT (OUTPATIENT)
Dept: URGENT CARE | Facility: CLINIC | Age: 38
End: 2018-01-26
Payer: COMMERCIAL

## 2018-01-26 VITALS
OXYGEN SATURATION: 97 % | WEIGHT: 160 LBS | HEIGHT: 59 IN | SYSTOLIC BLOOD PRESSURE: 137 MMHG | BODY MASS INDEX: 32.25 KG/M2 | HEART RATE: 98 BPM | RESPIRATION RATE: 18 BRPM | TEMPERATURE: 98 F | DIASTOLIC BLOOD PRESSURE: 90 MMHG

## 2018-01-26 DIAGNOSIS — J06.9 UPPER RESPIRATORY TRACT INFECTION, UNSPECIFIED TYPE: Primary | ICD-10-CM

## 2018-01-26 LAB
CTP QC/QA: YES
FLUAV AG NPH QL: NEGATIVE
FLUBV AG NPH QL: NEGATIVE

## 2018-01-26 PROCEDURE — 87804 INFLUENZA ASSAY W/OPTIC: CPT | Mod: 59,QW,S$GLB, | Performed by: PHYSICIAN ASSISTANT

## 2018-01-26 PROCEDURE — 99214 OFFICE O/P EST MOD 30 MIN: CPT | Mod: SA,S$GLB,, | Performed by: PHYSICIAN ASSISTANT

## 2018-01-26 NOTE — PROGRESS NOTES
"Subjective:       Patient ID: Kelly Thomason is a 37 y.o. female.    Vitals:  height is 4' 11" (1.499 m) and weight is 72.6 kg (160 lb). Her oral temperature is 98.2 °F (36.8 °C). Her blood pressure is 137/90 (abnormal) and her pulse is 98. Her respiration is 18 and oxygen saturation is 97%.     Chief Complaint: URI (cough and headache started yesterday)    This is a 37 y.o. female with Past Medical History:  No date: Depression  No date: Head ache  No date: Hives  No date: Kidney stones  No date: Urticaria   who presents today with a chief complaint of uri since yesterday.        URI    This is a new problem. The current episode started yesterday. The problem has been gradually worsening. There has been no fever. Associated symptoms include congestion, coughing, headaches, nausea and rhinorrhea. Pertinent negatives include no abdominal pain, chest pain, ear pain, sore throat or wheezing. She has tried decongestant for the symptoms. The treatment provided mild relief.     Review of Systems   Constitution: Positive for chills and malaise/fatigue. Negative for fever.   HENT: Positive for congestion, hoarse voice and rhinorrhea. Negative for ear pain and sore throat.    Eyes: Negative for discharge and redness.   Cardiovascular: Negative for chest pain, dyspnea on exertion and leg swelling.   Respiratory: Positive for cough and sputum production. Negative for shortness of breath and wheezing.    Musculoskeletal: Positive for myalgias.   Gastrointestinal: Positive for nausea. Negative for abdominal pain.   Neurological: Positive for headaches.       Objective:      Physical Exam   Constitutional: She is oriented to person, place, and time. She appears well-developed and well-nourished.   HENT:   Head: Normocephalic and atraumatic.   Right Ear: Hearing, tympanic membrane, external ear and ear canal normal.   Left Ear: Hearing, tympanic membrane, external ear and ear canal normal.   Nose: Nose normal.   Mouth/Throat: " Uvula is midline and oropharynx is clear and moist.   Eyes: Conjunctivae are normal.   Neck: Normal range of motion. Neck supple. No thyromegaly present.   Cardiovascular: Normal rate and regular rhythm.  Exam reveals no gallop and no friction rub.    No murmur heard.  Pulmonary/Chest: Effort normal and breath sounds normal. She has no wheezes. She has no rales.   Musculoskeletal: Normal range of motion.   Lymphadenopathy:     She has no cervical adenopathy.   Neurological: She is alert and oriented to person, place, and time.   Skin: Skin is warm and dry. No rash noted. No erythema.   Psychiatric: She has a normal mood and affect. Her behavior is normal. Judgment and thought content normal.   Nursing note and vitals reviewed.      Assessment:       1. Upper respiratory tract infection, unspecified type        Plan:         Upper respiratory tract infection, unspecified type  -     POCT Influenza A/B       was seen today for uri.    Diagnoses and all orders for this visit:    Upper respiratory tract infection, unspecified type  -     POCT Influenza A/B      Patient Instructions   - Rest.    - Drink plenty of fluids.    - Tylenol or Ibuprofen as directed as needed for fever/pain.    - Take over-the-counter claritin, zyrtec, allegra, or xyzal as directed.  - Use over the counter Flonase as directed for sinus congestion and postnasal drip.  - use nasal saline prior to Flonase.  - Antibiotics are not needed at this time.  - Usual course of cold symptom is 10-14 days, but longer if patient is a smoker.   - Use salt water gargle for sore throat.  - Follow up with your PCP or specialty clinic as directed in the next 1-2 weeks if not improved or as needed.  You can call [phone] to schedule an appointment with the appropriate provider.    - Go to the ED if your symptoms worsen.    Viral Upper Respiratory Illness (Adult)  You have a viral upper respiratory illness (URI), which is another term for the common cold.  This illness is contagious during the first few days. It is spread through the air by coughing and sneezing. It may also be spread by direct contact (touching the sick person and then touching your own eyes, nose, or mouth). Frequent handwashing will decrease risk of spread. Most viral illnesses go away within 7 to 10 days with rest and simple home remedies. Sometimes the illness may last for several weeks. Antibiotics will not kill a virus, and they are generally not prescribed for this condition.    Home care  · If symptoms are severe, rest at home for the first 2 to 3 days. When you resume activity, don't let yourself get too tired.  · Avoid being exposed to cigarette smoke (yours or others).  · You may use acetaminophen or ibuprofen to control pain and fever, unless another medicine was prescribed. (Note: If you have chronic liver or kidney disease, have ever had a stomach ulcer or gastrointestinal bleeding, or are taking blood-thinning medicines, talk with your healthcare provider before using these medicines.) Aspirin should never be given to anyone under 18 years of age who is ill with a viral infection or fever. It may cause severe liver or brain damage.  · Your appetite may be poor, so a light diet is fine. Avoid dehydration by drinking 6 to 8 glasses of fluids per day (water, soft drinks, juices, tea, or soup). Extra fluids will help loosen secretions in the nose and lungs.  · Over-the-counter cold medicines will not shorten the length of time youre sick, but they may be helpful for the following symptoms: cough, sore throat, and nasal and sinus congestion. (Note: Do not use decongestants if you have high blood pressure.)  Follow-up care  Follow up with your healthcare provider, or as advised.  When to seek medical advice  Call your healthcare provider right away if any of these occur:  · Cough with lots of colored sputum (mucus)  · Severe headache; face, neck, or ear pain  · Difficulty swallowing due to  throat pain  · Fever of 100.4°F (38°C)  Call 911, or get immediate medical care  Call emergency services right away if any of these occur:  · Chest pain, shortness of breath, wheezing, or difficulty breathing  · Coughing up blood  · Inability to swallow due to throat pain  Date Last Reviewed: 9/13/2015  © 6626-3757 [employer]. [address] [place] [address]. All rights reserved. This information is not intended as a substitute for professional medical care. Always follow your healthcare professional's instructions.

## 2018-01-26 NOTE — TELEPHONE ENCOUNTER
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 Patient got a flu shot this season. Patient has not been in contact with anyone she knows for sure has the flu and her 3 yr old does not have any symptoms. Patient's symptoms started this morning. Patient advised to call her nearby urgent care to schedule an appt to be evaluated. She is interested in taking tamiflu. No other concerns or questions at this time.

## 2018-01-26 NOTE — PATIENT INSTRUCTIONS
- Rest.    - Drink plenty of fluids.    - Tylenol or Ibuprofen as directed as needed for fever/pain.    - Take over-the-counter claritin, zyrtec, allegra, or xyzal as directed.  - Use over the counter Flonase as directed for sinus congestion and postnasal drip.  - use nasal saline prior to Flonase.  - Antibiotics are not needed at this time.  - Usual course of cold symptom is 10-14 days, but longer if patient is a smoker.   - Use salt water gargle for sore throat.  - Follow up with your PCP or specialty clinic as directed in the next 1-2 weeks if not improved or as needed.  You can call [phone] to schedule an appointment with the appropriate provider.    - Go to the ED if your symptoms worsen.    Viral Upper Respiratory Illness (Adult)  You have a viral upper respiratory illness (URI), which is another term for the common cold. This illness is contagious during the first few days. It is spread through the air by coughing and sneezing. It may also be spread by direct contact (touching the sick person and then touching your own eyes, nose, or mouth). Frequent handwashing will decrease risk of spread. Most viral illnesses go away within 7 to 10 days with rest and simple home remedies. Sometimes the illness may last for several weeks. Antibiotics will not kill a virus, and they are generally not prescribed for this condition.    Home care  · If symptoms are severe, rest at home for the first 2 to 3 days. When you resume activity, don't let yourself get too tired.  · Avoid being exposed to cigarette smoke (yours or others).  · You may use acetaminophen or ibuprofen to control pain and fever, unless another medicine was prescribed. (Note: If you have chronic liver or kidney disease, have ever had a stomach ulcer or gastrointestinal bleeding, or are taking blood-thinning medicines, talk with your healthcare provider before using these medicines.) Aspirin should never be given to anyone under 18 years of age who is  ill with a viral infection or fever. It may cause severe liver or brain damage.  · Your appetite may be poor, so a light diet is fine. Avoid dehydration by drinking 6 to 8 glasses of fluids per day (water, soft drinks, juices, tea, or soup). Extra fluids will help loosen secretions in the nose and lungs.  · Over-the-counter cold medicines will not shorten the length of time youre sick, but they may be helpful for the following symptoms: cough, sore throat, and nasal and sinus congestion. (Note: Do not use decongestants if you have high blood pressure.)  Follow-up care  Follow up with your healthcare provider, or as advised.  When to seek medical advice  Call your healthcare provider right away if any of these occur:  · Cough with lots of colored sputum (mucus)  · Severe headache; face, neck, or ear pain  · Difficulty swallowing due to throat pain  · Fever of 100.4°F (38°C)  Call 911, or get immediate medical care  Call emergency services right away if any of these occur:  · Chest pain, shortness of breath, wheezing, or difficulty breathing  · Coughing up blood  · Inability to swallow due to throat pain  Date Last Reviewed: 9/13/2015  © 7139-1240 [employer]. [address] [place] [address], [place] [address]. All rights reserved. This information is not intended as a substitute for professional medical care. Always follow your healthcare professional's instructions.

## 2018-03-14 RX ORDER — SUMATRIPTAN SUCCINATE 100 MG/1
TABLET ORAL
Qty: 10 TABLET | Refills: 4 | Status: SHIPPED | OUTPATIENT
Start: 2018-03-14 | End: 2018-06-06 | Stop reason: SDUPTHER

## 2018-04-23 ENCOUNTER — OFFICE VISIT (OUTPATIENT)
Dept: NEUROLOGY | Facility: CLINIC | Age: 38
End: 2018-04-23
Payer: COMMERCIAL

## 2018-04-23 VITALS
DIASTOLIC BLOOD PRESSURE: 78 MMHG | SYSTOLIC BLOOD PRESSURE: 115 MMHG | HEART RATE: 75 BPM | BODY MASS INDEX: 32.31 KG/M2 | WEIGHT: 160.25 LBS | HEIGHT: 59 IN

## 2018-04-23 DIAGNOSIS — M79.18 MYOFASCIAL PAIN: ICD-10-CM

## 2018-04-23 DIAGNOSIS — R51.9 CHRONIC DAILY HEADACHE: ICD-10-CM

## 2018-04-23 DIAGNOSIS — G43.709 CHRONIC MIGRAINE WITHOUT AURA WITHOUT STATUS MIGRAINOSUS, NOT INTRACTABLE: Primary | ICD-10-CM

## 2018-04-23 PROCEDURE — 99999 PR PBB SHADOW E&M-EST. PATIENT-LVL III: CPT | Mod: PBBFAC,,, | Performed by: NURSE PRACTITIONER

## 2018-04-23 PROCEDURE — 99215 OFFICE O/P EST HI 40 MIN: CPT | Mod: SA,S$GLB,, | Performed by: NURSE PRACTITIONER

## 2018-04-23 RX ORDER — PREDNISONE 20 MG/1
TABLET ORAL
Qty: 12 TABLET | Refills: 0 | Status: SHIPPED | OUTPATIENT
Start: 2018-04-23 | End: 2018-06-04 | Stop reason: ALTCHOICE

## 2018-04-23 RX ORDER — BACLOFEN 10 MG/1
10 TABLET ORAL NIGHTLY
Qty: 30 TABLET | Refills: 5 | Status: SHIPPED | OUTPATIENT
Start: 2018-04-23 | End: 2019-05-21 | Stop reason: SDUPTHER

## 2018-04-23 RX ORDER — ZOLMITRIPTAN 5 MG/1
TABLET, FILM COATED ORAL
Qty: 12 TABLET | Refills: 5 | Status: SHIPPED | OUTPATIENT
Start: 2018-04-23 | End: 2018-08-27

## 2018-04-23 NOTE — Clinical Note
Please start process to get patient approved for Botox injections for chronic migraine.  Thanks! She is [facility].

## 2018-04-23 NOTE — PROGRESS NOTES
Established Patient   SUBJECTIVE:  Patient ID: Kelly Thomason   Chief Complaint: Headache and Consult    History of Present Illness:  Kelly Thomason is a 37 y.o. female with migraines, depression, and hx of kidney stones who presents to clinic alone for evaluation of headaches.  Patient of Dr. Monzon and Dr. Chatman, she is a new patient to me.       04/23/2018 - Interval History:  At last appointment with Dr. Holm in January 2017, it was suggested  take 1/2 tab Sumatriptan 100 mg with 1 1/2 tabs aleve for migraine abortive.  For a while her migraines had been great, states she only rarely expereinced any migraines during pregnancy as well as while she was breast feeding.  She stopped breast feeding last summer and migraines have since gradually become more frequent.  She reports having a headache nearly everyday with full blown migraines occurring 1-2 times per week, each lasting a full day.  She has tried using sumatriptan with aleve, however she does not find this effective.  She has suffered from migraines since she was 14 years old, she denies any change in the quality or nature of her headaches, this is how they have always been, just becoming more frequent.  Migraine is described as a pounding pain typically located behind her eye (can be either right or left sided) and temple, radiating into the neck.  Denies presence of aura symptoms.  Migraines are associated with nausea, photophobia, phonophobia.  Worsened with movement/activity.  Rates pain from 6-7 out of 10.  Additionally, she has noticed when she gets a migraine while on her menstrual cycle, the intensity is much worse, headache is harder to break, and duration is significantly longer, these headaches come the week before her menstrual cycle.  She is currently on Zoloft for prevention, but has tried numerous preventive and abortive options in the past (listed below).    Additionally, she has secondary complaints of intermittent lateral  double vision which only occurs when she is either driving or watching tv.  She has noticed it typically only occurs after she has been watching tv for a while or has been driving for a good bit.  She believes the double vision resolves after she gets out of her car or walks away from the television but is unsure.  She has seen her optometrist who did not find any ocular cause for these symptoms.  She feels this has been going on for over a year.  When she gets double vision, if she closes on of her eyes these symptoms will resolve.     Notes from Dr. Holm:  History: Patient presents for follow-up for her migraine headaches.  Insurance will no longer cover Treximet, so she would like to have sumatriptan prescription and be able to add Aleve to it.  She typically takes half tablet of the Treximet.  We will give her the sumatriptan 100 mg which she can take half a tablet of in 1-1-1/2 Aleve with it.  Patient had been pregnant with no headaches for a while.  She has had increased frequency in the last 6 months with 2-3 headaches per month with good response to the Treximet.  She had tried that Cephaly  device but complains she doesn't have time to 2 minutes with a 2-year-old.  Previous note: 10-3-13: This is a 33-year-old white female who presents for followup for her migraine headaches. Patient was previously seen by Dr. Nicolle Chatman who is no longer here. Patient describes a 20 year history of migraine headaches. She describes right or left temporal pain radiating to the hand eye causing aching/deep pain in the eye radiating into the neck. Headache intensity is 5-6/10 associated with nausea, photophobia, sonophobia and lasting all day. The patient has no aura. She generally takes a quarter tablet of Treximet with relief in 30-60 minutes. She takes Cymbalta 30 mg and nortriptyline 25 mg h.s. for migraine prevention. Overall she has been doing quite well with this medication regimen and is here predominantly to  "establish with a new neurologist.    Therapies Tried & Response:   Pamelor - unsure   Treximet - helped   Cefaly -   Sumatriptan -   Cymbalta - unsure  Zoloft -   Tramadol -   Excedrin -   Effexor - unsure   Topiramate - unsure, cannot try again hx of kidney stone  Zomig NS -   Zomig PO -   Lamictal   Elavil - weight gain   Magnesium -   relpax -   Paxil - no   Midrin - ?  Darvocet -   Nadolol  Prednisone - given today   Baclofen - given today   Zomig PO - retried today     Current Medications:    cetirizine (ZYRTEC) 10 MG tablet, Take 1 tablet (10 mg total) by mouth once daily., Disp: , Rfl: 0    desog-e.estradiol/e.estradiol (KARIVA) 0.15-0.02 mgx21 /0.01 mg x 5 per tablet, Take 1 tablet by mouth once daily., Disp: 84 tablet, Rfl: 3    sertraline (ZOLOFT) 100 MG tablet, Take 1 tablet by mouth once a day, Disp: 30 tablet, Rfl: 12    sumatriptan (IMITREX) 100 MG tablet, TAKE 1 TABLET BY MOUTH  EVERY 2 HOURS AS NEEDED FOR MIGRAINE(S) . DO NOT EXCEED 2 TABLETS PER 24 HOURS., Disp: 10 tablet, Rfl: 4    baclofen (LIORESAL) 10 MG tablet, Take 1 tablet (10 mg total) by mouth nightly., Disp: 30 tablet, Rfl: 5    ferrous sulfate 325 mg (65 mg iron) Tab tablet, Take 325 mg by mouth daily with breakfast., Disp: , Rfl:     predniSONE (DELTASONE) 20 MG tablet, Take 3 tabs in the morning x 2 days, then 2 tabs x 2 days, then 1 tab x 2 days.  Take in the morning with food., Disp: 12 tablet, Rfl: 0    ZOLMitriptan (ZOMIG) 5 MG tablet, Take 1 tab at onset of migraine, may repeat dose in 2 hours if needed. Max 2 tabs/day. Max 3 days/week., Disp: 12 tablet, Rfl: 5    Review of Systems - as per HPI, otherwise a balanced 10 systems review is negative.    OBJECTIVE:  Vitals:  /78   Pulse 75   Ht 4' 11" (1.499 m)   Wt 72.7 kg (160 lb 4.4 oz)   BMI 32.37 kg/m²      Physical Exam:  Constitutional: She appears well-developed and well-nourished. She is well groomed. NAD. Obese   HENT:    Head: Normocephalic and atraumatic, " oral and nasal mucosa intact.  Frontalis was NTTP, temporalis was NTTP   Eyes: Conjunctivae and EOM are normal. Pupils are equal, round, and reactive to light   Neck: Neck supple. Occiput NTTP and trapezius TTP bilaterally L>R.  Significant trap tightness R>L.  Paraspinals tight R>L.      Musculoskeletal: Normal range of motion. No joint stiffness.   Skin: Skin is warm and dry.  Psychiatric: Normal mood and affect.     Neuro exam:    Mental status:  The patient is awake, alert, and oriented to person, place and time.  Language is intact and fluent  Remote and recent memory are intact  Normal attention and concentration  Mood is stable    Cranial Nerves:  Fundoscopic examination does not reveal any occult papilledema.    Pupils are equal and reactive to light.    Extraocular movements are intact and without nystagmus.    Visual fields are full to confrontation testing.   Facial movement is symmetric.  Facial sensation is intact.    Hearing is intact to finger rub   FROM of neck in all (6) directions.  Shoulder shrug symmetrical.    Coordination:     Finger to nose - normal and symmetric bilaterally   Heel to shin test - normal and symmetric bilaterally     Motor:  Normal muscle bulk and symmetry. No fasciculations were noted.   Tremor not apparent   Pronator drift not apparent.    strength was strong and symmetric    Finger extension strength was strong and symmetric   RUE:appropriate against gravity and medium force as tested 5/5  LUE: appropriate against gravity and medium force as tested 5/5  RLE:appropriate against gravity and medium force as tested 5/5              LLE: appropriate against gravity and medium force as tested 5/5    Reflexes:  Right Brachioradialis 2+  Left Brachioradialis 2+  Right Biceps 2+  Left Biceps 2+  Right Patellar2+  Left Patellar 2+  Right Achilles 2+  Left Achilles 2+                          Plantar flexion bilat   Sibley was negative bilaterally      Sensory:  RUE  intact light  touch  LUE intact light touch    RLE intact light touch  LLE intact light touch    Gait Testing:  Gait Normal   Romberg - negative  Heel, Tandem, Toe walk - able to perform without difficulty     Review of Data:   Notes from Dr. Holm OBGYN, urgent care, allergist, and internal medicine reviewed   Labs:  Office Visit on 01/26/2018   Component Date Value Ref Range Status    Rapid Influenza A Ag 01/26/2018 Negative  Negative Final    Rapid Influenza B Ag 01/26/2018 Negative  Negative Final     Acceptable 01/26/2018 Yes   Final   Results for SHANNON QUIROZ (MRN [id number]) as of 4/23/2018 09:15   Ref. Range 10/30/2017 12:14   WBC Latest Ref Range: 3.90 - 12.70 K/uL 7.19   RBC Latest Ref Range: 4.00 - 5.40 M/uL 4.64   Hemoglobin Latest Ref Range: 12.0 - 16.0 g/dL 14.4   Hematocrit Latest Ref Range: 37.0 - 48.5 % 43.1   MCV Latest Ref Range: 82 - 98 fL 93   MCH Latest Ref Range: 27.0 - 31.0 pg 31.0   MCHC Latest Ref Range: 32.0 - 36.0 g/dL 33.4   RDW Latest Ref Range: 11.5 - 14.5 % 12.5   Platelets Latest Ref Range: 150 - 350 K/uL 338   MPV Latest Ref Range: 9.2 - 12.9 fL 9.8   Gran% Latest Ref Range: 38.0 - 73.0 % 65.6   Gran # (ANC) Latest Ref Range: 1.8 - 7.7 K/uL 4.7   Lymph% Latest Ref Range: 18.0 - 48.0 % 25.9   Lymph # Latest Ref Range: 1.0 - 4.8 K/uL 1.9   Mono% Latest Ref Range: 4.0 - 15.0 % 6.1   Mono # Latest Ref Range: 0.3 - 1.0 K/uL 0.4   Eosinophil% Latest Ref Range: 0.0 - 8.0 % 1.5   Eos # Latest Ref Range: 0.0 - 0.5 K/uL 0.1   Basophil% Latest Ref Range: 0.0 - 1.9 % 0.6   Baso # Latest Ref Range: 0.00 - 0.20 K/uL 0.04   nRBC Latest Ref Range: 0 /100 WBC 0   Iron Latest Ref Range: 30 - 160 ug/dL 110   TIBC Latest Ref Range: 250 - 450 ug/dL 333   Saturated Iron Latest Ref Range: 20 - 50 % 33   Transferrin Latest Ref Range: 200 - 375 mg/dL 225   Ferritin Latest Ref Range: 20.0 - 300.0 ng/mL 82   Vitamin B-12 Latest Ref Range: 210 - 950 pg/mL 412   Sodium Latest Ref Range: 136 - 145 mmol/L  138   Potassium Latest Ref Range: 3.5 - 5.1 mmol/L 4.0   Chloride Latest Ref Range: 95 - 110 mmol/L 103   CO2 Latest Ref Range: 23 - 29 mmol/L 26   Anion Gap Latest Ref Range: 8 - 16 mmol/L 9   BUN, Bld Latest Ref Range: 6 - 20 mg/dL 14   Creatinine Latest Ref Range: 0.5 - 1.4 mg/dL 0.8   eGFR if non African American Latest Ref Range: >60 mL/min/1.73 m^2 >60.0   eGFR if African American Latest Ref Range: >60 mL/min/1.73 m^2 >60.0   Glucose Latest Ref Range: 70 - 110 mg/dL 86   Calcium Latest Ref Range: 8.7 - 10.5 mg/dL 9.9   Alkaline Phosphatase Latest Ref Range: 55 - 135 U/L 104   Total Protein Latest Ref Range: 6.0 - 8.4 g/dL 7.5   Protein, Serum Latest Ref Range: 6.0 - 8.4 g/dL 7.4   Albumin Latest Ref Range: 3.5 - 5.2 g/dL 3.9   Total Bilirubin Latest Ref Range: 0.1 - 1.0 mg/dL 0.5   AST Latest Ref Range: 10 - 40 U/L 16   ALT Latest Ref Range: 10 - 44 U/L 10   TSH Latest Ref Range: 0.400 - 4.000 uIU/mL 2.914   Thyroperoxidase Antibodies Latest Ref Range: <6.0 IU/mL <6.0   A. fumigatus Class Unknown CLASS 0   Altern. alternata Class Unknown CLASS 0   Alternaria alternata Latest Ref Range: <0.35 kU/L <0.35   Aspergillus Fumigatus IgE Latest Ref Range: <0.35 kU/L <0.35   Bermuda Grass Latest Ref Range: <0.35 kU/L <0.35   Bermuda Grass Class Unknown CLASS 0   Cat Dander Latest Ref Range: <0.35 kU/L <0.35   Cat Epithelium Class Unknown CLASS 0   [place] Gap Class Unknown CLASS 0   Locust Gap IgE Latest Ref Range: <0.35 kU/L <0.35   Cockroach, IgE Unknown CLASS I   D. farinae Latest Ref Range: <0.35 kU/L <0.35   D. farinae Class Unknown CLASS 0   D. pteronyssinus Class Unknown CLASS 0   Dog Dander Class Unknown CLASS 0   Dog Dander, IgE Latest Ref Range: <0.35 kU/L <0.35   English Plantain Class Unknown CLASS 0   Marshelder Class Unknown CLASS 0   Marshelder IgE Latest Ref Range: <0.35 kU/L <0.35   Mite Dust Pteronyssinus IgE Latest Ref Range: <0.35 kU/L <0.35   Oak, Class Unknown CLASS 0   Plantain Latest Ref Range: <0.35 kU/L  <0.35   Ragweed, Western IgE Latest Ref Range: <0.35 kU/L <0.35   Ragweed, Western, Class Unknown CLASS 0   Jalen Grass Latest Ref Range: <0.35 kU/L <0.35   Jalen Grass Class Unknown CLASS 0   White Oak(Quercus alba) IgE Latest Ref Range: <0.35 kU/L <0.35   IgE Latest Ref Range: 0 - 100 IU/mL <35   Albumin grams/dl Latest Ref Range: 3.35 - 5.55 g/dL 4.32   Alpha-1 grams/dl Latest Ref Range: 0.17 - 0.41 g/dL 0.38   Alpha-2 grams/dl Latest Ref Range: 0.43 - 0.99 g/dL 0.72   Beta grams/dl Latest Ref Range: 0.50 - 1.10 g/dL 0.84   Gamma grams/dl Latest Ref Range: 0.67 - 1.58 g/dL 1.15   Pathologist Interpretation SPE Unknown REVIEWED   IgE Receptor Antibody Unknown Negative for baso...   CIU Associated Basophil Activation Latest Ref Range: <13 % of basophils  7   Differential Method Unknown Automated   Thyroglobulin Ab Screen Latest Ref Range: 0.0 - 3.9 IU/mL <4.0   Tryptase Latest Ref Range: <11.5 ng/mL 5.6     Note: I have independently reviewed any/all imaging/labs/tests and agree with the report (s) as documented.  Any discrepancies will be as noted/demarcated by free text.  ARMAAN URIBE 4/23/2018    Focused examination was undertaken today.     ASSESSMENT:  1. Chronic migraine without aura without status migrainosus, not intractable    2. Chronic daily headache    3. Myofascial pain      Medical Decision Making:  BOTOX  The patient has chronic migraines (G43.719) and suffers from headaches more than 15 days a month lasting more than 4 hours a day with no relief of symptoms despite trying multiple medications including lamictal, elavil, pamelor, topiramate, nadolol, effexor, cymbalta, paxil, zoloft, midrin, darvocet, relpax, treximet, sumatriptan, Zomig NS and Zomig PO, magnesium.  Botox treatment was approved for chronic migraines in October 2010. The patient will be an ideal candidate for Botox. We are planning for 3 treatments 3 months apart and aiming for at least 50% improvement in the symptoms. If we see no  improvement after 3 treatments, we will discontinue the injections.    PLAN:  - Seek approval for Botox injections for chronic migraine   - To break up headache cycle - given 6 day prednisone taper, 60 mg x 2 days, 40 mg x 2 days, 20 mg x 2 days.  Take in the morning with food   - To help with trap tightness - given Baclofen 10 mg nightly   - For migraine abortive - will retry Zomig 5 mg tabs   - Depression - continue Zoloft, managed by PCP   - If additional preventive medications are needed - may consider starting Propranolol   - Start tracking headaches daily   - Discussed goals of therapy are to decrease the frequency, intensity, and duration of headaches  - Follow up in 6 weeks to start Botox injections for chronic migraine     Orders Placed This Encounter    predniSONE (DELTASONE) 20 MG tablet    ZOLMitriptan (ZOMIG) 5 MG tablet    baclofen (LIORESAL) 10 MG tablet     I spent 50 minutes face-to-face with the patient with >50% of the time spent with counseling and education regarding:  - results of data, diagnosis, and recommendations stated above  - risks, benefits, and potential side effects of Botox, prednisone, zomig discussed   - alternative treatment options including propranolol offered   - importance of healthy diet, regular exercise and sleep hygiene in the treatment of headaches     Questions and concerns were sought and answered to the patient's stated verbal satisfaction.  The patient verbalizes understanding and agreement with the above stated treatment plan.     CC: MD Makayla Bhatt, FNP-C  [facility]   [phone]    Dr. Dudley was available during today's encounter.

## 2018-04-24 ENCOUNTER — PATIENT MESSAGE (OUTPATIENT)
Dept: OBSTETRICS AND GYNECOLOGY | Facility: CLINIC | Age: 38
End: 2018-04-24

## 2018-04-30 DIAGNOSIS — G43.709 CHRONIC MIGRAINE WITHOUT AURA WITHOUT STATUS MIGRAINOSUS, NOT INTRACTABLE: Primary | ICD-10-CM

## 2018-05-07 ENCOUNTER — OFFICE VISIT (OUTPATIENT)
Dept: OBSTETRICS AND GYNECOLOGY | Facility: CLINIC | Age: 38
End: 2018-05-07
Payer: COMMERCIAL

## 2018-05-07 VITALS
HEIGHT: 59 IN | SYSTOLIC BLOOD PRESSURE: 118 MMHG | WEIGHT: 158.75 LBS | BODY MASS INDEX: 32 KG/M2 | DIASTOLIC BLOOD PRESSURE: 82 MMHG

## 2018-05-07 DIAGNOSIS — Z30.09 ENCOUNTER FOR OTHER GENERAL COUNSELING AND ADVICE ON CONTRACEPTION: Primary | ICD-10-CM

## 2018-05-07 PROCEDURE — 99213 OFFICE O/P EST LOW 20 MIN: CPT | Mod: SA,S$GLB,, | Performed by: NURSE PRACTITIONER

## 2018-05-07 PROCEDURE — 3008F BODY MASS INDEX DOCD: CPT | Mod: CPTII,S$GLB,, | Performed by: NURSE PRACTITIONER

## 2018-05-07 PROCEDURE — 99999 PR PBB SHADOW E&M-EST. PATIENT-LVL III: CPT | Mod: PBBFAC,,, | Performed by: NURSE PRACTITIONER

## 2018-05-07 NOTE — PROGRESS NOTES
CC: Nexplanon consult    HPI: Pt is a 37 y.o.  female who presents for a birth control consult. She is thinking about the Nexplanon. She has been on POPs and OCPs in the past. Reports hx of migraines without aura. About to start botox injections for migraine relief. Knows her migraines are hormonal and notices them more premenstrual and during cycle. Unsure of IUD. She has a 3 year-old at home.     ROS:  GENERAL: Feeling well overall. Denies fever or chills.   SKIN: Denies rash or lesions.   HEAD: Denies head injury or headache.   NODES: Denies enlarged lymph nodes.   CHEST: Denies chest pain or shortness of breath.   CARDIOVASCULAR: Denies palpitations or left sided chest pain.   ABDOMEN: No abdominal pain, constipation, diarrhea, nausea, vomiting or rectal bleeding.   URINARY: No dysuria, hematuria, or burning on urination.  REPRODUCTIVE: See HPI.   BREASTS: Denies pain, lumps, or nipple discharge.   HEMATOLOGIC: No easy bruisability or excessive bleeding.   MUSCULOSKELETAL: Denies joint pain or swelling.   NEUROLOGIC: Denies syncope or weakness.   PSYCHIATRIC: Denies depression, anxiety or mood swings.    PE:   APPEARANCE: Well nourished, well developed, White female in no acute distress.  PELVIC: Deferred    Diagnosis:  1. Encounter for other general counseling and advice on contraception      Plan:   1. Chooses to proceed with Nexplanon and prior-authorization initiated today     Follow-up for Neplanon insertion      Answers for HPI/ROS submitted by the patient on 5/6/2018   Gynecologic exam  genital itching: No  genital lesions: No  genital odor: No  genital rash: No  missed menses: No  pelvic pain: No  vaginal bleeding: No  vaginal discharge: No  Pain severity: no pain  Pregnant now?: No  abdominal pain: No  anorexia: No  back pain: No  chills: No  constipation: No  diarrhea: No  discolored urine: No  dysuria: No  fever: No  flank pain: No  frequency: No  headaches: Yes  hematuria: No  nausea: No  painful  intercourse: No  rash: No  urgency: No  vomiting: No  Please select the characteristics of your discharge: : normal  Vaginal bleeding: no bleeding  Passing clots?: No  Passing tissue?: No  Aggravated by: nothing  treatments tried: nothing  Sexual activity: not sexually active  Partner with STD symptoms: no  Birth control: nothing  Menstrual history: irregular  STD: No  abdominal surgery: No   section: No  Ectopic pregnancy: No  Endometriosis: No  herpes simplex: No  gynecological surgery: No  menorrhagia: No  metrorrhagia: No  miscarriage: No  ovarian cysts: No  perineal abscess: No  PID: No  terminated pregnancy: No  vaginosis: Yes

## 2018-05-08 ENCOUNTER — TELEPHONE (OUTPATIENT)
Dept: NEUROLOGY | Facility: CLINIC | Age: 38
End: 2018-05-08

## 2018-05-08 NOTE — TELEPHONE ENCOUNTER
----- Message from Keo Pacheco sent at 5/8/2018  8:53 AM CDT -----  Needs Medical Advice    Who Called: Alice patrick/ [facility]   Communication Preference (MyChart response to Pt. (or) Call Back # and timeframe): [phone]  Additional Information: Alice is asking for pt's history, dose of botox, and previous medications tried, to apporve botox. Pls fax to .

## 2018-05-11 ENCOUNTER — TELEPHONE (OUTPATIENT)
Dept: OBSTETRICS AND GYNECOLOGY | Facility: CLINIC | Age: 38
End: 2018-05-11

## 2018-05-11 NOTE — TELEPHONE ENCOUNTER
Called pt informed her that her nexplanon was covered 100% buy and bill. Pt was schedule insertion on may 16 @2pm. Patient verbalized understanding.

## 2018-05-12 RX ORDER — NORETHINDRONE 0.35 MG
KIT ORAL
Qty: 84 TABLET | Refills: 0 | Status: SHIPPED | OUTPATIENT
Start: 2018-05-12 | End: 2018-05-16

## 2018-05-13 ENCOUNTER — PATIENT MESSAGE (OUTPATIENT)
Dept: OBSTETRICS AND GYNECOLOGY | Facility: CLINIC | Age: 38
End: 2018-05-13

## 2018-05-16 ENCOUNTER — TELEPHONE (OUTPATIENT)
Dept: NEUROLOGY | Facility: CLINIC | Age: 38
End: 2018-05-16

## 2018-05-16 ENCOUNTER — LAB VISIT (OUTPATIENT)
Dept: LAB | Facility: OTHER | Age: 38
End: 2018-05-16
Payer: COMMERCIAL

## 2018-05-16 ENCOUNTER — PROCEDURE VISIT (OUTPATIENT)
Dept: OBSTETRICS AND GYNECOLOGY | Facility: CLINIC | Age: 38
End: 2018-05-16
Payer: COMMERCIAL

## 2018-05-16 VITALS
BODY MASS INDEX: 30.95 KG/M2 | DIASTOLIC BLOOD PRESSURE: 80 MMHG | HEIGHT: 60 IN | SYSTOLIC BLOOD PRESSURE: 130 MMHG | WEIGHT: 157.63 LBS

## 2018-05-16 DIAGNOSIS — Z11.3 SCREEN FOR STD (SEXUALLY TRANSMITTED DISEASE): ICD-10-CM

## 2018-05-16 DIAGNOSIS — Z30.017 NEXPLANON INSERTION: Primary | ICD-10-CM

## 2018-05-16 PROBLEM — Z97.5 NEXPLANON IN PLACE: Status: ACTIVE | Noted: 2018-05-16

## 2018-05-16 LAB
B-HCG UR QL: NEGATIVE
CANDIDA RRNA VAG QL PROBE: NEGATIVE
CTP QC/QA: YES
G VAGINALIS RRNA GENITAL QL PROBE: NEGATIVE
T VAGINALIS RRNA GENITAL QL PROBE: NEGATIVE

## 2018-05-16 PROCEDURE — 81025 URINE PREGNANCY TEST: CPT | Mod: S$GLB,,, | Performed by: NURSE PRACTITIONER

## 2018-05-16 PROCEDURE — 36415 COLL VENOUS BLD VENIPUNCTURE: CPT

## 2018-05-16 PROCEDURE — 87480 CANDIDA DNA DIR PROBE: CPT

## 2018-05-16 PROCEDURE — 80074 ACUTE HEPATITIS PANEL: CPT

## 2018-05-16 PROCEDURE — 86703 HIV-1/HIV-2 1 RESULT ANTBDY: CPT

## 2018-05-16 PROCEDURE — 87491 CHLMYD TRACH DNA AMP PROBE: CPT

## 2018-05-16 PROCEDURE — 87510 GARDNER VAG DNA DIR PROBE: CPT

## 2018-05-16 PROCEDURE — 11981 INSERTION DRUG DLVR IMPLANT: CPT | Mod: S$GLB,,, | Performed by: NURSE PRACTITIONER

## 2018-05-16 PROCEDURE — 86592 SYPHILIS TEST NON-TREP QUAL: CPT

## 2018-05-16 NOTE — PROCEDURES
Procedures   CC: NEXPLANON INSERTION      PRE-NEXPLANON INSERTION COUNSELING:  All contraceptive options were reviewed and the patient chooses Nexplanon.  Patients history was reviewed and there were no contraindications to Nexplanon.  The procedure and minimal risks of pain, bleeding, bruising and infection at the insertion site discussed. Possible irregular menstrual bleeding pattern versus amenorrhea was explained.  No protection against STDs discussed.  Written information provided; all questions answered and patient agrees to proceed.  Consent signed and scanned into computer.  NEXPLANON LOT #[id number],  EXPIRATION 11/2020    Vitals:    05/16/18 1441   BP: 130/80       EXAM:  With patient in supine position the nondominant arm was flexed at the elbow and externally rotated.  The insertion site was identified 6-8 cm above the elbow crease at the inner side of the upper arm overlying the groove between biceps and triceps.  The insertion site was marked and a second carlene was placed 6-8 cm above the first.    PROCEDURE:  TIME OUT PERFORMED.  The insertion site was prepped with antiseptic and injected with 3 cc of 1% Xylocaine without epinephrine subq along the planned insertion canal.  Using sterile technique the Nexplanon applicator was visually verified and removed from the blister pack.  The Transparent Protection Cap was removed by sliding it horizontally in the direction of the arrow away from the needle.  The white colored implant was visualized by looking into the tip of the needle.   The needle tip was inserted bevel side up at a 30 degree angle to penetrate the skin.  The applicator was lowered parallel to the arm and the skin was tented with the needle.  While lifting skin with the needle, the needle was inserted to its full length.  Keeping the applicator in place, the purple slider was unlocked by pushing it slightly down.  The slider was then moved fully back until it stopped.  The applicator was then  removed. The Needle was noted to be fully retracted and only the purple tip of the obturator was visible.  The implant was palpable beneath the skin after insertion.  A small adhesive bandage and then a pressure bandage was placed over the insertion site.  The patient tolerated the procedure well.    ASSESSMENT:  1. Contraception management / Nexplanon insertion.V25.0.    POST NEXPLANON INSERTION COUNSELING:  Manage post nexplanon placement arm pain with NSAIDs, Tylenol or Rx per MedCard.  Keep arm elevated and apply intermittent ice packs to decrease pain and bruising for 24 Hours.  May remove bandage in 24 hours.  Nexplanon danger signs (worsening pain at insertion site, bleeding through bandage, redness and/or pus drainage at insertion site).  Removal in 3 years.  Back up method x 7 days  Full STD panel today    Counseling lasted approximately 15 minutes and all her questions were answered.    FOLLOW-UP: prn

## 2018-05-16 NOTE — TELEPHONE ENCOUNTER
----- Message from Anushka Zamarripa MA sent at 5/11/2018  2:14 PM CDT -----  Do you know anything about this? I'll fax notes if they need them...which ones?    Anushka  ----- Message -----  From: Denisse Arroyo RN  Sent: 5/10/2018   8:19 AM  To: MARVIN Multani-  Would you please do?  Thank you!  Denisse  ----- Message -----  From: Keo Pacheco  Sent: 5/10/2018   8:12 AM  To: Denisse Arroyo RN    Needs Medical Advice    Who Called: Alice patrick/ [facility]  Communication Preference (MyChart response to Pt. (or) Call Back # and timeframe): [phone]  Additional Information: Alice states they did not receive the clinical notes you faxed on 05/08, and they're asking they be faxed again to  (add ref# [id number])

## 2018-05-17 LAB
C TRACH DNA SPEC QL NAA+PROBE: NOT DETECTED
HAV IGM SERPL QL IA: NEGATIVE
HBV CORE IGM SERPL QL IA: NEGATIVE
HBV SURFACE AG SERPL QL IA: NEGATIVE
HCV AB SERPL QL IA: NEGATIVE
HIV 1+2 AB+HIV1 P24 AG SERPL QL IA: NEGATIVE
N GONORRHOEA DNA SPEC QL NAA+PROBE: NOT DETECTED
RPR SER QL: NORMAL

## 2018-05-22 ENCOUNTER — PATIENT MESSAGE (OUTPATIENT)
Dept: NEUROLOGY | Facility: CLINIC | Age: 38
End: 2018-05-22

## 2018-05-30 ENCOUNTER — TELEPHONE (OUTPATIENT)
Dept: NEUROLOGY | Facility: CLINIC | Age: 38
End: 2018-05-30

## 2018-06-04 ENCOUNTER — PROCEDURE VISIT (OUTPATIENT)
Dept: NEUROLOGY | Facility: CLINIC | Age: 38
End: 2018-06-04
Payer: COMMERCIAL

## 2018-06-04 VITALS
HEIGHT: 59 IN | SYSTOLIC BLOOD PRESSURE: 126 MMHG | HEART RATE: 101 BPM | WEIGHT: 155.19 LBS | BODY MASS INDEX: 31.28 KG/M2 | DIASTOLIC BLOOD PRESSURE: 84 MMHG

## 2018-06-04 DIAGNOSIS — G43.709 CHRONIC MIGRAINE WITHOUT AURA WITHOUT STATUS MIGRAINOSUS, NOT INTRACTABLE: Primary | ICD-10-CM

## 2018-06-04 PROCEDURE — 64615 CHEMODENERV MUSC MIGRAINE: CPT | Mod: S$GLB,,, | Performed by: NURSE PRACTITIONER

## 2018-06-04 NOTE — PROCEDURES
SUBJECTIVE:  Patient ID: Kelly Thomason  Chief Complaint: Headache; Botulinum Toxin Injection; and Follow-up    History of Present Illness:  Kelly Thomason is a 37 y.o. female who presents to clinic alone for follow-up of headaches and Botox injections.     Recommendations made at last Office Visit on 4/23/2018:  - Seek approval for Botox injections for chronic migraine   - To break up headache cycle - given 6 day prednisone taper, 60 mg x 2 days, 40 mg x 2 days, 20 mg x 2 days.  Take in the morning with food   - To help with trap tightness - given Baclofen 10 mg nightly   - For migraine abortive - will retry Zomig 5 mg tabs   - Depression - continue Zoloft, managed by PCP   - If additional preventive medications are needed - may consider starting Propranolol   - Start tracking headaches daily   - Discussed goals of therapy are to decrease the frequency, intensity, and duration of headaches  - Follow up in 6 weeks to start Botox injections for chronic migraine     06/04/2018- Interval History:  Zomig did not give her any relief, Sumatriptan gives her more relief.  Prednisone did help slightly to break up headache cycle.  She has only been taking Sumatriptan 1-2 times per week.  She continues to state she does not wish to add daily medication to prevention.  Headaches have not changed in nature or quality.  Risks, Benefits, and potential side effects of Botox discussed with patient.  Alternative treatments offered.  After thorough discussed regarding the procedure, patient has decided to move forward with initiating Botox injections for Chronic Migraine.  Patient understands we will complete 3 rounds of injections, if after 3 rounds, we do not see a 50% reduction in headaches, we will discontinue Botox injections.     04/23/2018 - Interval History:  At last appointment with Dr. Holm in January 2017, it was suggested  take 1/2 tab Sumatriptan 100 mg with 1 1/2 tabs aleve for migraine abortive.  For a while  her migraines had been great, states she only rarely expereinced any migraines during pregnancy as well as while she was breast feeding.  She stopped breast feeding last summer and migraines have since gradually become more frequent.  She reports having a headache nearly everyday with full blown migraines occurring 1-2 times per week, each lasting a full day.  She has tried using sumatriptan with aleve, however she does not find this effective.  She has suffered from migraines since she was 14 years old, she denies any change in the quality or nature of her headaches, this is how they have always been, just becoming more frequent.  Migraine is described as a pounding pain typically located behind her eye (can be either right or left sided) and temple, radiating into the neck.  Denies presence of aura symptoms.  Migraines are associated with nausea, photophobia, phonophobia.  Worsened with movement/activity.  Rates pain from 6-7 out of 10.  Additionally, she has noticed when she gets a migraine while on her menstrual cycle, the intensity is much worse, headache is harder to break, and duration is significantly longer, these headaches come the week before her menstrual cycle.  She is currently on Zoloft for prevention, but has tried numerous preventive and abortive options in the past (listed below).    Additionally, she has secondary complaints of intermittent lateral double vision which only occurs when she is either driving or watching tv.  She has noticed it typically only occurs after she has been watching tv for a while or has been driving for a good bit.  She believes the double vision resolves after she gets out of her car or walks away from the television but is unsure.  She has seen her optometrist who did not find any ocular cause for these symptoms.  She feels this has been going on for over a year.  When she gets double vision, if she closes on of her eyes these symptoms will resolve.      Notes from   [facility]:  History: Patient presents for follow-up for her migraine headaches.  Insurance will no longer cover Treximet, so she would like to have sumatriptan prescription and be able to add Aleve to it.  She typically takes half tablet of the Treximet.  We will give her the sumatriptan 100 mg which she can take half a tablet of in 1-1-1/2 Aleve with it.  Patient had been pregnant with no headaches for a while.  She has had increased frequency in the last 6 months with 2-3 headaches per month with good response to the Treximet.  She had tried that Cephaly  device but complains she doesn't have time to 2 minutes with a 2-year-old.  Previous note: 10-3-13: This is a 33-year-old white female who presents for followup for her migraine headaches. Patient was previously seen by Dr. Nicolle Chatman who is no longer here. Patient describes a 20 year history of migraine headaches. She describes right or left temporal pain radiating to the hand eye causing aching/deep pain in the eye radiating into the neck. Headache intensity is 5-6/10 associated with nausea, photophobia, sonophobia and lasting all day. The patient has no aura. She generally takes a quarter tablet of Treximet with relief in 30-60 minutes. She takes Cymbalta 30 mg and nortriptyline 25 mg h.s. for migraine prevention. Overall she has been doing quite well with this medication regimen and is here predominantly to establish with a new neurologist.     Therapies Tried & Response:   Pamelor - unsure   Treximet - helped   Cefaly -   Sumatriptan -   Cymbalta - unsure  Zoloft -   Tramadol -   Excedrin -   Effexor - unsure   Topiramate - unsure, cannot try again hx of kidney stone  Zomig NS -   Zomig PO -   Lamictal   Elavil - weight gain   Magnesium -   relpax -   Paxil - no   Midrin - ?  Darvocet -   Nadolol  Prednisone -    Baclofen -   Zomig PO -   Botox - started today     Current Medications:    baclofen (LIORESAL) 10 MG tablet, Take 1 tablet (10 mg total) by  "mouth nightly., Disp: 30 tablet, Rfl: 5    cetirizine (ZYRTEC) 10 MG tablet, Take 1 tablet (10 mg total) by mouth once daily. (Patient taking differently: Take 10 mg by mouth continuous prn. ), Disp: , Rfl: 0    sertraline (ZOLOFT) 100 MG tablet, Take 1 tablet by mouth once a day, Disp: 30 tablet, Rfl: 12    sumatriptan (IMITREX) 100 MG tablet, TAKE 1 TABLET BY MOUTH  EVERY 2 HOURS AS NEEDED FOR MIGRAINE(S) . DO NOT EXCEED 2 TABLETS PER 24 HOURS., Disp: 10 tablet, Rfl: 4    ZOLMitriptan (ZOMIG) 5 MG tablet, Take 1 tab at onset of migraine, may repeat dose in 2 hours if needed. Max 2 tabs/day. Max 3 days/week., Disp: 12 tablet, Rfl: 5    Current Facility-Administered Medications:     etonogestrel Impl 68 mg, 68 mg, Subdermal, Once, Paola Tony, DEVEN    onabotulinumtoxina injection 200 Units, 200 Units, Intramuscular, Q90 Days, DINORA HodgeP, 200 Units at 06/04/18 1506    Review of Systems - as per HPI, otherwise a balanced 10 systems review is negative.    OBJECTIVE:  Vitals:  /84 (BP Location: Right arm, Patient Position: Sitting, BP Method: Large (Automatic))   Pulse 101   Ht 4' 11" (1.499 m)   Wt 70.4 kg (155 lb 3.3 oz)   BMI 31.35 kg/m²     ASSESSMENT:  1. Chronic migraine without aura without status migrainosus, not intractable      PLAN:  - Botox administered in clinic for Chronic Migraine (see below)   - Continue all medications as directed  - Has sumatriptan available for migraine abortive   - No refills needed at this time   - RTC in 3 months for repeat Botox injections or sooner if needed     All questions and concerns addressed.  Patient verbalizes understanding and is agreeable with the above stated treatment plan.      PROCEDURE NOTE:  BOTOX was performed as an indicated therapy for intractable chronic migraine headaches given that the patient failed more than 2 headache medications    Two patient identifiers were confirmed with the patient prior to performing this procedure. " A time out to determine correct patient and and agreement on procedure performed was conducted prior to the procedure.      Botulinum Toxin Injection Procedure   Procedure: Chemical neurolysis   After risks and benefits were explained including bleeding, infection, worsening of pain, damage to the areas being injected, weakness of muscles, loss of muscle control, dysphagia if injecting the head or neck, facial droop if injecting the facial area, painful injection, allergic or other reaction to the medications being injected, and the failure of the procedure to help the problem, a signed consent was obtained.   The patient was placed in a comfortable area and the sites to be treated were identified.The area to be treated was prepped three times with alcohol and the alcohol allowed to dry. Next, a 30 gauge needle was used to inject the medication in the area to be treated.      Total Botox used: 155 Units   Botox wastage: 45 Units     Injection sites:    muscle bilaterally ( a total of 10 units divided into 2 sites)   Procerus muscle (5 units)   Frontalis muscle bilaterally (a total of 20 units divided into 4 sites)   Temporalis muscle bilaterally (a total of 40 units divided into 8 sites)   Occipitalis muscle bilaterally (a total of 30 units divided into 6 sites)   Cervical paraspinal muscles (a total of 20 units divided into 4 sites)   Trapezius muscle bilaterally (a total of 30 units divided into 6 sites)   Complications: none   RTC for the next Botox injection: 3 months     MEDICATION DELIVERED FROM [facility] USED FOR PATIENT'S INJECTIONS.     CC: MD Makayla Bhatt, FNP-C [facility] of Neurology   [phone]

## 2018-06-06 ENCOUNTER — PATIENT MESSAGE (OUTPATIENT)
Dept: NEUROLOGY | Facility: CLINIC | Age: 38
End: 2018-06-06

## 2018-06-06 RX ORDER — SUMATRIPTAN SUCCINATE 100 MG/1
TABLET ORAL
Qty: 10 TABLET | Refills: 4 | Status: SHIPPED | OUTPATIENT
Start: 2018-06-06 | End: 2018-07-24 | Stop reason: SDUPTHER

## 2018-06-28 ENCOUNTER — PATIENT MESSAGE (OUTPATIENT)
Dept: NEUROLOGY | Facility: CLINIC | Age: 38
End: 2018-06-28

## 2018-07-04 RX ORDER — NORETHINDRONE 0.35 MG
KIT ORAL
Qty: 84 TABLET | Refills: 0 | Status: SHIPPED | OUTPATIENT
Start: 2018-07-04 | End: 2018-08-27

## 2018-07-24 ENCOUNTER — PATIENT MESSAGE (OUTPATIENT)
Dept: NEUROLOGY | Facility: CLINIC | Age: 38
End: 2018-07-24

## 2018-07-24 DIAGNOSIS — F41.9 ANXIETY: ICD-10-CM

## 2018-07-24 RX ORDER — SERTRALINE HYDROCHLORIDE 100 MG/1
TABLET, FILM COATED ORAL
Qty: 30 TABLET | Refills: 6 | Status: SHIPPED | OUTPATIENT
Start: 2018-07-24 | End: 2019-01-03 | Stop reason: SDUPTHER

## 2018-07-24 RX ORDER — SUMATRIPTAN SUCCINATE 100 MG/1
TABLET ORAL
Qty: 36 TABLET | Refills: 3 | Status: SHIPPED | OUTPATIENT
Start: 2018-07-24 | End: 2019-01-03 | Stop reason: SDUPTHER

## 2018-08-14 DIAGNOSIS — G43.709 COMMON TRANSFORMED MIGRAINE WITHOUT AURA: Primary | ICD-10-CM

## 2018-08-14 DIAGNOSIS — G43.709 CHRONIC MIGRAINE WITHOUT AURA WITHOUT STATUS MIGRAINOSUS, NOT INTRACTABLE: ICD-10-CM

## 2018-08-27 ENCOUNTER — PROCEDURE VISIT (OUTPATIENT)
Dept: NEUROLOGY | Facility: CLINIC | Age: 38
End: 2018-08-27
Payer: COMMERCIAL

## 2018-08-27 VITALS
WEIGHT: 157.44 LBS | HEART RATE: 92 BPM | DIASTOLIC BLOOD PRESSURE: 77 MMHG | BODY MASS INDEX: 31.74 KG/M2 | HEIGHT: 59 IN | SYSTOLIC BLOOD PRESSURE: 103 MMHG

## 2018-08-27 DIAGNOSIS — G43.709 CHRONIC MIGRAINE WITHOUT AURA WITHOUT STATUS MIGRAINOSUS, NOT INTRACTABLE: Primary | ICD-10-CM

## 2018-08-27 PROCEDURE — 64615 CHEMODENERV MUSC MIGRAINE: CPT | Mod: S$GLB,,, | Performed by: NURSE PRACTITIONER

## 2018-08-27 NOTE — PROCEDURES
SUBJECTIVE:  Patient ID: Kelly Thomason  Chief Complaint: Botulinum Toxin Injection    History of Present Illness:  Kelly Thomason is a 38 y.o. female who presents to clinic alone for follow-up of headaches and Botox injections.     Recommendations made at last Office Visit on 6/4/2018:  - Botox administered in clinic for Chronic Migraine (see below)   - Continue all medications as directed  - Has sumatriptan available for migraine abortive   - No refills needed at this time   - RTC in 3 months for repeat Botox injections or sooner if needed     08/27/2018- Interval History:  She does feel migraine frequency has decreased after first dose of Botox, also feels migraines are more responsive to medication.  She denies any presence of side effects from Botox injections and wishes to continue with treatment at this time.     06/04/2018- Interval History:  Zomig did not give her any relief, Sumatriptan gives her more relief.  Prednisone did help slightly to break up headache cycle.  She has only been taking Sumatriptan 1-2 times per week.  She continues to state she does not wish to add daily medication to prevention.  Headaches have not changed in nature or quality.  Risks, Benefits, and potential side effects of Botox discussed with patient.  Alternative treatments offered.  After thorough discussed regarding the procedure, patient has decided to move forward with initiating Botox injections for Chronic Migraine.  Patient understands we will complete 3 rounds of injections, if after 3 rounds, we do not see a 50% reduction in headaches, we will discontinue Botox injections.      04/23/2018 - Interval History:  At last appointment with Dr. Holm in January 2017, it was suggested  take 1/2 tab Sumatriptan 100 mg with 1 1/2 tabs aleve for migraine abortive.  For a while her migraines had been great, states she only rarely expereinced any migraines during pregnancy as well as while she was breast feeding.  She  stopped breast feeding last summer and migraines have since gradually become more frequent.  She reports having a headache nearly everyday with full blown migraines occurring 1-2 times per week, each lasting a full day.  She has tried using sumatriptan with aleve, however she does not find this effective.  She has suffered from migraines since she was 14 years old, she denies any change in the quality or nature of her headaches, this is how they have always been, just becoming more frequent.  Migraine is described as a pounding pain typically located behind her eye (can be either right or left sided) and temple, radiating into the neck.  Denies presence of aura symptoms.  Migraines are associated with nausea, photophobia, phonophobia.  Worsened with movement/activity.  Rates pain from 6-7 out of 10.  Additionally, she has noticed when she gets a migraine while on her menstrual cycle, the intensity is much worse, headache is harder to break, and duration is significantly longer, these headaches come the week before her menstrual cycle.  She is currently on Zoloft for prevention, but has tried numerous preventive and abortive options in the past (listed below).    Additionally, she has secondary complaints of intermittent lateral double vision which only occurs when she is either driving or watching tv.  She has noticed it typically only occurs after she has been watching tv for a while or has been driving for a good bit.  She believes the double vision resolves after she gets out of her car or walks away from the television but is unsure.  She has seen her optometrist who did not find any ocular cause for these symptoms.  She feels this has been going on for over a year.  When she gets double vision, if she closes on of her eyes these symptoms will resolve.      Notes from Dr. Holm:  History: Patient presents for follow-up for her migraine headaches.  Insurance will no longer cover Treximet, so she would like to  have sumatriptan prescription and be able to add Aleve to it.  She typically takes half tablet of the Treximet.  We will give her the sumatriptan 100 mg which she can take half a tablet of in 1-1-1/2 Aleve with it.  Patient had been pregnant with no headaches for a while.  She has had increased frequency in the last 6 months with 2-3 headaches per month with good response to the Treximet.  She had tried that Cephaly  device but complains she doesn't have time to 2 minutes with a 2-year-old.  Previous note: 10-3-13: This is a 33-year-old white female who presents for followup for her migraine headaches. Patient was previously seen by Dr. Nicolle Chatman who is no longer here. Patient describes a 20 year history of migraine headaches. She describes right or left temporal pain radiating to the hand eye causing aching/deep pain in the eye radiating into the neck. Headache intensity is 5-6/10 associated with nausea, photophobia, sonophobia and lasting all day. The patient has no aura. She generally takes a quarter tablet of Treximet with relief in 30-60 minutes. She takes Cymbalta 30 mg and nortriptyline 25 mg h.s. for migraine prevention. Overall she has been doing quite well with this medication regimen and is here predominantly to establish with a new neurologist.     Therapies Tried & Response:   Pamelor - unsure   Treximet - helped   Cefaly -   Sumatriptan -   Cymbalta - unsure  Zoloft -   Tramadol -   Excedrin -   Effexor - unsure   Topiramate - unsure, cannot try again hx of kidney stone  Zomig NS -   Zomig PO -   Lamictal   Elavil - weight gain   Magnesium -   relpax -   Paxil - no   Midrin - ?  Darvocet -   Nadolol  Prednisone -    Baclofen -   Zomig PO -   Botox - decreased frequency after 1st round     Current Medications:    baclofen (LIORESAL) 10 MG tablet, Take 1 tablet (10 mg total) by mouth nightly., Disp: 30 tablet, Rfl: 5    sertraline (ZOLOFT) 100 MG tablet, TAKE 1 TABLET BY MOUTH ONCE A DAY, Disp: 30  "tablet, Rfl: 6    sumatriptan (IMITREX) 100 MG tablet, Take 1 tab at onset of migraine, can repeat in 2 hrs if needed. Max 2 tabs/day. Max 3 days/week., Disp: 36 tablet, Rfl: 3    Current Facility-Administered Medications:     etonogestrel Impl 68 mg, 68 mg, Subdermal, Once, Paola Tony, DEVEN    onabotulinumtoxina injection 200 Units, 200 Units, Intramuscular, Q90 Days, Makayla Aly, FNP, 200 Units at 08/27/18 1545    onabotulinumtoxina injection 200 Units, 200 Units, Intramuscular, Q90 Days, Makayla Aly, FNP    Review of Systems - as per HPI, otherwise a balanced 10 systems review is negative.    OBJECTIVE:  Vitals:  /77 (BP Location: Right arm, Patient Position: Sitting, BP Method: Large (Automatic))   Pulse 92   Ht 4' 11" (1.499 m)   Wt 71.4 kg (157 lb 6.5 oz)   BMI 31.79 kg/m²     Physical Exam:  Constitutional: she appears well-developed and well-nourished. she is well groomed. NAD     ASSESSMENT:  1. Chronic migraine without aura without status migrainosus, not intractable      PLAN:  - Botox administered in clinic for Chronic Migraine (see below)   - Continue all medications as directed   - No refills needed at this time   - RTC in 12 weeks for repeat Botox injections or sooner if needed     All questions and concerns addressed.  Patient verbalizes understanding and is agreeable with the above stated treatment plan.      PROCEDURE NOTE:  BOTOX was performed as an indicated therapy for intractable chronic migraine headaches given that the patient failed more than 2 headache medications    Two patient identifiers were confirmed with the patient prior to performing this procedure. A time out to determine correct patient and and agreement on procedure performed was conducted prior to the procedure.      Botulinum Toxin Injection Procedure   Procedure: Chemical neurolysis   After risks and benefits were explained including bleeding, infection, worsening of pain, damage to the areas " being injected, weakness of muscles, loss of muscle control, dysphagia if injecting the head or neck, facial droop if injecting the facial area, painful injection, allergic or other reaction to the medications being injected, and the failure of the procedure to help the problem, a signed consent was obtained.   The patient was placed in a comfortable area and the sites to be treated were identified.The area to be treated was prepped three times with alcohol and the alcohol allowed to dry. Next, a 30 gauge needle was used to inject the medication in the area to be treated.      Total Botox used: 155 Units   Botox wastage: 45 Units     Injection sites:    muscle bilaterally ( a total of 10 units divided into 2 sites)   Procerus muscle (5 units)   Frontalis muscle bilaterally (a total of 20 units divided into 4 sites)   Temporalis muscle bilaterally (a total of 40 units divided into 8 sites)   Occipitalis muscle bilaterally (a total of 30 units divided into 6 sites)   Cervical paraspinal muscles (a total of 20 units divided into 4 sites)   Trapezius muscle bilaterally (a total of 30 units divided into 6 sites)   Complications: none   RTC for the next Botox injection: 12 weeks     CC: MD Makayla Bhatt, JUANA-DERRICK [facility] Department of Neurology   [phone]

## 2018-10-24 ENCOUNTER — PATIENT MESSAGE (OUTPATIENT)
Dept: OBSTETRICS AND GYNECOLOGY | Facility: CLINIC | Age: 38
End: 2018-10-24

## 2018-11-13 ENCOUNTER — PROCEDURE VISIT (OUTPATIENT)
Dept: NEUROLOGY | Facility: CLINIC | Age: 38
End: 2018-11-13
Payer: COMMERCIAL

## 2018-11-13 VITALS
HEIGHT: 59 IN | DIASTOLIC BLOOD PRESSURE: 88 MMHG | SYSTOLIC BLOOD PRESSURE: 120 MMHG | HEART RATE: 96 BPM | WEIGHT: 159.38 LBS | BODY MASS INDEX: 32.13 KG/M2

## 2018-11-13 DIAGNOSIS — M79.18 MYOFASCIAL PAIN: ICD-10-CM

## 2018-11-13 DIAGNOSIS — E66.9 OBESITY (BMI 30.0-34.9): ICD-10-CM

## 2018-11-13 DIAGNOSIS — G43.709 CHRONIC MIGRAINE WITHOUT AURA WITHOUT STATUS MIGRAINOSUS, NOT INTRACTABLE: Primary | ICD-10-CM

## 2018-11-13 DIAGNOSIS — F32.A DEPRESSION, UNSPECIFIED DEPRESSION TYPE: ICD-10-CM

## 2018-11-13 PROCEDURE — 99213 OFFICE O/P EST LOW 20 MIN: CPT | Mod: 25,S$GLB,, | Performed by: NURSE PRACTITIONER

## 2018-11-13 PROCEDURE — 64615 CHEMODENERV MUSC MIGRAINE: CPT | Mod: S$GLB,,, | Performed by: NURSE PRACTITIONER

## 2018-11-13 NOTE — PROCEDURES
"SUBJECTIVE:  Patient ID: Kelly Thomason  Chief Complaint: Follow-up and Botulinum Toxin Injection    History of Present Illness:  Kelly Thomason is a 38 y.o. female who presents to clinic alone for follow-up of headaches and Botox injections.     Recommendations made at last Office Visit on 8/27/2018:  - Botox administered in clinic for Chronic Migraine (see below)   - Continue all medications as directed   - No refills needed at this time   - RTC in 12 weeks for repeat Botox injections or sooner if needed     11/13/2018- Interval History:   presents to clinic for third round of Botox, after her second round of Botox she noticed a decrease in the frequency of her migraines, admits she has been under increased stress over the last few weeks and migraines have not been significantly more frequent, which they would have been prior to beginning Botox injections.  Prior to beginning Botox, she was suffering with 1-2 debilitating migraines per week, over the last twelve weeks she feels she has only had 1-2 debilitating migraines and "a couple" moderate to severe migraines.  Sumatriptan continues to give her good relief, on only one occasion sumatriptan has not been effective following the first dose.  She has questions regarding alternative treatment options including Aimovig, Frova and Cambia.  Discussed with regards to Aimovig, I would prefer to have her continue with Botox injections, as they have been quite effective in minimizing the burden of her migraines, for at least 3 rounds.  If after round 3 she would like to stop Botox and consider alternative options I would be agreeable to this plan.  I would not recommend her use Frova for migraine abortive as the onset of action is quite slow and her migraines intensify to peak fairly quickly.  Cambia could be used to supplement Sumatriptan in place of Naprosyn, however after learning Cambia is similar to Naprosyn, she feels more comfortable with continuing with " use of Naprosyn.      08/27/2018- Interval History:  She does feel migraine frequency has decreased after first dose of Botox, also feels migraines are more responsive to medication.  She denies any presence of side effects from Botox injections and wishes to continue with treatment at this time.      06/04/2018- Interval History:  Zomig did not give her any relief, Sumatriptan gives her more relief.  Prednisone did help slightly to break up headache cycle.  She has only been taking Sumatriptan 1-2 times per week.  She continues to state she does not wish to add daily medication to prevention.  Headaches have not changed in nature or quality.  Risks, Benefits, and potential side effects of Botox discussed with patient.  Alternative treatments offered.  After thorough discussed regarding the procedure, patient has decided to move forward with initiating Botox injections for Chronic Migraine.  Patient understands we will complete 3 rounds of injections, if after 3 rounds, we do not see a 50% reduction in headaches, we will discontinue Botox injections.      04/23/2018 - Interval History:  At last appointment with Dr. Holm in January 2017, it was suggested  take 1/2 tab Sumatriptan 100 mg with 1 1/2 tabs aleve for migraine abortive.  For a while her migraines had been great, states she only rarely expereinced any migraines during pregnancy as well as while she was breast feeding.  She stopped breast feeding last summer and migraines have since gradually become more frequent.  She reports having a headache nearly everyday with full blown migraines occurring 1-2 times per week, each lasting a full day.  She has tried using sumatriptan with aleve, however she does not find this effective.  She has suffered from migraines since she was 14 years old, she denies any change in the quality or nature of her headaches, this is how they have always been, just becoming more frequent.  Migraine is described as a pounding  pain typically located behind her eye (can be either right or left sided) and temple, radiating into the neck.  Denies presence of aura symptoms.  Migraines are associated with nausea, photophobia, phonophobia.  Worsened with movement/activity.  Rates pain from 6-7 out of 10.  Additionally, she has noticed when she gets a migraine while on her menstrual cycle, the intensity is much worse, headache is harder to break, and duration is significantly longer, these headaches come the week before her menstrual cycle.  She is currently on Zoloft for prevention, but has tried numerous preventive and abortive options in the past (listed below).    Additionally, she has secondary complaints of intermittent lateral double vision which only occurs when she is either driving or watching tv.  She has noticed it typically only occurs after she has been watching tv for a while or has been driving for a good bit.  She believes the double vision resolves after she gets out of her car or walks away from the television but is unsure.  She has seen her optometrist who did not find any ocular cause for these symptoms.  She feels this has been going on for over a year.  When she gets double vision, if she closes on of her eyes these symptoms will resolve.      Notes from Dr. Holm:  History: Patient presents for follow-up for her migraine headaches.  Insurance will no longer cover Treximet, so she would like to have sumatriptan prescription and be able to add Aleve to it.  She typically takes half tablet of the Treximet.  We will give her the sumatriptan 100 mg which she can take half a tablet of in 1-1-1/2 Aleve with it.  Patient had been pregnant with no headaches for a while.  She has had increased frequency in the last 6 months with 2-3 headaches per month with good response to the Treximet.  She had tried that Cephaly  device but complains she doesn't have time to 2 minutes with a 2-year-old.  Previous note: 10-3-13: This is a  33-year-old white female who presents for followup for her migraine headaches. Patient was previously seen by Dr. Nicolle Chatman who is no longer here. Patient describes a 20 year history of migraine headaches. She describes right or left temporal pain radiating to the hand eye causing aching/deep pain in the eye radiating into the neck. Headache intensity is 5-6/10 associated with nausea, photophobia, sonophobia and lasting all day. The patient has no aura. She generally takes a quarter tablet of Treximet with relief in 30-60 minutes. She takes Cymbalta 30 mg and nortriptyline 25 mg h.s. for migraine prevention. Overall she has been doing quite well with this medication regimen and is here predominantly to establish with a new neurologist.     Therapies Tried & Response:   Pamelor - unsure   Treximet - helped   Cefaly -   Sumatriptan -   Cymbalta - unsure  Zoloft -   Tramadol -   Excedrin -   Effexor - unsure   Topiramate - unsure, cannot try again hx of kidney stone  Zomig NS -   Zomig PO -   Lamictal   Elavil - weight gain   Magnesium -   relpax -   Paxil - no   Midrin - ?  Darvocet -   Nadolol  Prednisone -    Baclofen -   Zomig PO -   Botox -helping     Current Medications:    sertraline (ZOLOFT) 100 MG tablet, TAKE 1 TABLET BY MOUTH ONCE A DAY, Disp: 30 tablet, Rfl: 6    sumatriptan (IMITREX) 100 MG tablet, Take 1 tab at onset of migraine, can repeat in 2 hrs if needed. Max 2 tabs/day. Max 3 days/week., Disp: 36 tablet, Rfl: 3    baclofen (LIORESAL) 10 MG tablet, Take 1 tablet (10 mg total) by mouth nightly., Disp: 30 tablet, Rfl: 5    Current Facility-Administered Medications:     etonogestrel Impl 68 mg, 68 mg, Subdermal, Once, Paola Tony NP    onabotulinumtoxina injection 200 Units, 200 Units, Intramuscular, Q90 Days, Makayla Aly, JUANA, 200 Units at 08/27/18 1545    Review of Systems - as per HPI, otherwise a balanced 10 systems review is negative.    OBJECTIVE:  Vitals:  /88 (BP  "Location: Right arm, Patient Position: Sitting, BP Method: Large (Automatic))   Pulse 96   Ht 4' 11" (1.499 m)   Wt 72.3 kg (159 lb 6.3 oz)   BMI 32.19 kg/m²     Physical Exam:  Constitutional: she appears well-developed and well-nourished. she is well groomed. NAD   HENT:    Head: Normocephalic and atraumatic  Eyes: Conjunctivae and EOM are normal  Musculoskeletal: Normal range of motion. No joint stiffness.   Skin: Skin is warm and dry.  Psychiatric: Mood and affect are normal    Neuro: Patient is alert and oriented to person, place, and time. Language is intact and fluent.  Recent and remote memory are intact.  Normal attention and concentration.  Facial movement is symmetric.  Gait is normal.     ASSESSMENT:  1. Chronic migraine without aura without status migrainosus, not intractable    2. Depression, unspecified depression type    3. Myofascial pain    4. Obesity (BMI 30.0-34.9)      PLAN:  - Botox administered in clinic for Chronic Migraine (see below)   - Continue Zoloft and baclofen daily for migraine prevention   - Has sumatriptan available for migraine abortive   - Uses naprosyn for rescue   - No refills needed at this time   - Depression - well controlled on Zoloft, continue current regimen, management per PCP   - Obesity - encouraged her to begin exercising regularly and following a healthy diet   - RTC in 2 months for follow-up to discuss either continuing with Botox injections versus alternative treatment options such as CGRP inhibitor   - RTC in 12 weeks for repeat Botox injections or sooner if needed     All questions and concerns addressed.  Patient verbalizes understanding and is agreeable with the above stated treatment plan.      PROCEDURE NOTE:  BOTOX was performed as an indicated therapy for intractable chronic migraine headaches given that the patient failed more than 2 headache medications    Two patient identifiers were confirmed with the patient prior to performing this procedure. A " time out to determine correct patient and and agreement on procedure performed was conducted prior to the procedure.      Botulinum Toxin Injection Procedure   Procedure: Chemical neurolysis   After risks and benefits were explained including bleeding, infection, worsening of pain, damage to the areas being injected, weakness of muscles, loss of muscle control, dysphagia if injecting the head or neck, facial droop if injecting the facial area, painful injection, allergic or other reaction to the medications being injected, and the failure of the procedure to help the problem, a signed consent was obtained.   The patient was placed in a comfortable area and the sites to be treated were identified.The area to be treated was prepped three times with alcohol and the alcohol allowed to dry. Next, a 30 gauge needle was used to inject the medication in the area to be treated.      Total Botox used: 155 Units   Botox wastage: 45 Units     Injection sites:    muscle bilaterally ( a total of 10 units divided into 2 sites)   Procerus muscle (5 units)   Frontalis muscle bilaterally (a total of 20 units divided into 4 sites)   Temporalis muscle bilaterally (a total of 40 units divided into 8 sites)   Occipitalis muscle bilaterally (a total of 30 units divided into 6 sites)   Cervical paraspinal muscles (a total of 20 units divided into 4 sites)   Trapezius muscle bilaterally (a total of 30 units divided into 6 sites)   Complications: none   RTC for the next Botox injection: 12 weeks     BOTOX DELIVERED FROM [facility] USED FOR PATIENT'S INJECTIONS.    CC: MD Makayla Bhatt, ARMAAN [facility] Department of Neurology   [phone]

## 2019-01-02 ENCOUNTER — PATIENT MESSAGE (OUTPATIENT)
Dept: OBSTETRICS AND GYNECOLOGY | Facility: CLINIC | Age: 39
End: 2019-01-02

## 2019-01-03 ENCOUNTER — TELEPHONE (OUTPATIENT)
Dept: PHARMACY | Facility: CLINIC | Age: 39
End: 2019-01-03

## 2019-01-03 ENCOUNTER — OFFICE VISIT (OUTPATIENT)
Dept: NEUROLOGY | Facility: CLINIC | Age: 39
End: 2019-01-03
Payer: COMMERCIAL

## 2019-01-03 VITALS
WEIGHT: 160.5 LBS | DIASTOLIC BLOOD PRESSURE: 81 MMHG | BODY MASS INDEX: 32.36 KG/M2 | HEIGHT: 59 IN | SYSTOLIC BLOOD PRESSURE: 116 MMHG | HEART RATE: 77 BPM

## 2019-01-03 DIAGNOSIS — E66.9 OBESITY (BMI 30.0-34.9): ICD-10-CM

## 2019-01-03 DIAGNOSIS — G43.709 CHRONIC MIGRAINE WITHOUT AURA WITHOUT STATUS MIGRAINOSUS, NOT INTRACTABLE: Primary | ICD-10-CM

## 2019-01-03 DIAGNOSIS — F41.9 ANXIETY: ICD-10-CM

## 2019-01-03 PROCEDURE — 99999 PR PBB SHADOW E&M-EST. PATIENT-LVL III: CPT | Mod: PBBFAC,,, | Performed by: NURSE PRACTITIONER

## 2019-01-03 PROCEDURE — 3008F PR BODY MASS INDEX (BMI) DOCUMENTED: ICD-10-PCS | Mod: CPTII,S$GLB,, | Performed by: NURSE PRACTITIONER

## 2019-01-03 PROCEDURE — 99214 PR OFFICE/OUTPT VISIT, EST, LEVL IV, 30-39 MIN: ICD-10-PCS | Mod: S$GLB,,, | Performed by: NURSE PRACTITIONER

## 2019-01-03 PROCEDURE — 99999 PR PBB SHADOW E&M-EST. PATIENT-LVL III: ICD-10-PCS | Mod: PBBFAC,,, | Performed by: NURSE PRACTITIONER

## 2019-01-03 PROCEDURE — 3008F BODY MASS INDEX DOCD: CPT | Mod: CPTII,S$GLB,, | Performed by: NURSE PRACTITIONER

## 2019-01-03 PROCEDURE — 99214 OFFICE O/P EST MOD 30 MIN: CPT | Mod: S$GLB,,, | Performed by: NURSE PRACTITIONER

## 2019-01-03 RX ORDER — SUMATRIPTAN SUCCINATE 100 MG/1
TABLET ORAL
Qty: 27 TABLET | Refills: 3 | Status: SHIPPED | OUTPATIENT
Start: 2019-01-03 | End: 2019-12-02 | Stop reason: SDUPTHER

## 2019-01-03 RX ORDER — SERTRALINE HYDROCHLORIDE 100 MG/1
100 TABLET, FILM COATED ORAL DAILY
Qty: 90 TABLET | Refills: 3 | Status: SHIPPED | OUTPATIENT
Start: 2019-01-03 | End: 2019-10-08 | Stop reason: SDUPTHER

## 2019-01-03 NOTE — TELEPHONE ENCOUNTER
FESTUS ARIASP received a prescription for Aimovig. Requires a prior authorization and we will follow up with the patient.Np

## 2019-01-03 NOTE — PROGRESS NOTES
Established Patient   SUBJECTIVE:  Patient ID: Kelly Thomason   Chief Complaint: Follow-up    History of Present Illness:  Kelly Thomason is a 38 y.o. female who presents to clinic alone for follow-up of headaches.     Recommendations made at last Office Visit on 11/13/2018:  - Botox administered in clinic for Chronic Migraine (see below)   - Continue Zoloft and baclofen daily for migraine prevention   - Has sumatriptan available for migraine abortive   - Uses naprosyn for rescue   - No refills needed at this time   - Depression - well controlled on Zoloft, continue current regimen, management per PCP   - Obesity - encouraged her to begin exercising regularly and following a healthy diet   - RTC in 2 months for follow-up to discuss either continuing with Botox injections versus alternative treatment options such as CGRP inhibitor   - RTC in 12 weeks for repeat Botox injections or sooner if needed     01/03/2019 - Interval History:  Headaches and migraines have begun to occur more frequently, despite Botox injections, she no longer feels Botox is helping her migraines and wants to discuss changing her migraine prevention.  She has tried multiple oral medications as well as Botox without significant relief.  She is interested in trying a CGRP inhibitor for migraine prevention as well as discontinuing Botox injections. Migraines continue to feel the same as they always have, she denies any change in the quality or nature of her migraines.      11/13/2018- Interval History:   presents to clinic for third round of Botox, after her second round of Botox she noticed a decrease in the frequency of her migraines, admits she has been under increased stress over the last few weeks and migraines have not been significantly more frequent, which they would have been prior to beginning Botox injections.  Prior to beginning Botox, she was suffering with 1-2 debilitating migraines per week, over the last twelve weeks she  "feels she has only had 1-2 debilitating migraines and "a couple" moderate to severe migraines.  Sumatriptan continues to give her good relief, on only one occasion sumatriptan has not been effective following the first dose.  She has questions regarding alternative treatment options including Aimovig, Frova and Cambia.  Discussed with regards to Aimovig, I would prefer to have her continue with Botox injections, as they have been quite effective in minimizing the burden of her migraines, for at least 3 rounds.  If after round 3 she would like to stop Botox and consider alternative options I would be agreeable to this plan.  I would not recommend her use Frova for migraine abortive as the onset of action is quite slow and her migraines intensify to peak fairly quickly.  Cambia could be used to supplement Sumatriptan in place of Naprosyn, however after learning Cambia is similar to Naprosyn, she feels more comfortable with continuing with use of Naprosyn.       08/27/2018- Interval History:  She does feel migraine frequency has decreased after first dose of Botox, also feels migraines are more responsive to medication.  She denies any presence of side effects from Botox injections and wishes to continue with treatment at this time.      06/04/2018- Interval History:  Zomig did not give her any relief, Sumatriptan gives her more relief.  Prednisone did help slightly to break up headache cycle.  She has only been taking Sumatriptan 1-2 times per week.  She continues to state she does not wish to add daily medication to prevention.  Headaches have not changed in nature or quality.  Risks, Benefits, and potential side effects of Botox discussed with patient.  Alternative treatments offered.  After thorough discussed regarding the procedure, patient has decided to move forward with initiating Botox injections for Chronic Migraine.  Patient understands we will complete 3 rounds of injections, if after 3 rounds, we do not " see a 50% reduction in headaches, we will discontinue Botox injections.      04/23/2018 - Interval History:  At last appointment with Dr. Holm in January 2017, it was suggested  take 1/2 tab Sumatriptan 100 mg with 1 1/2 tabs aleve for migraine abortive.  For a while her migraines had been great, states she only rarely expereinced any migraines during pregnancy as well as while she was breast feeding.  She stopped breast feeding last summer and migraines have since gradually become more frequent.  She reports having a headache nearly everyday with full blown migraines occurring 1-2 times per week, each lasting a full day.  She has tried using sumatriptan with aleve, however she does not find this effective.  She has suffered from migraines since she was 14 years old, she denies any change in the quality or nature of her headaches, this is how they have always been, just becoming more frequent.  Migraine is described as a pounding pain typically located behind her eye (can be either right or left sided) and temple, radiating into the neck.  Denies presence of aura symptoms.  Migraines are associated with nausea, photophobia, phonophobia.  Worsened with movement/activity.  Rates pain from 6-7 out of 10.  Additionally, she has noticed when she gets a migraine while on her menstrual cycle, the intensity is much worse, headache is harder to break, and duration is significantly longer, these headaches come the week before her menstrual cycle.  She is currently on Zoloft for prevention, but has tried numerous preventive and abortive options in the past (listed below).    Additionally, she has secondary complaints of intermittent lateral double vision which only occurs when she is either driving or watching tv.  She has noticed it typically only occurs after she has been watching tv for a while or has been driving for a good bit.  She believes the double vision resolves after she gets out of her car or walks away  from the television but is unsure.  She has seen her optometrist who did not find any ocular cause for these symptoms.  She feels this has been going on for over a year.  When she gets double vision, if she closes on of her eyes these symptoms will resolve.      Notes from Dr. Holm:  History: Patient presents for follow-up for her migraine headaches.  Insurance will no longer cover Treximet, so she would like to have sumatriptan prescription and be able to add Aleve to it.  She typically takes half tablet of the Treximet.  We will give her the sumatriptan 100 mg which she can take half a tablet of in 1-1-1/2 Aleve with it.  Patient had been pregnant with no headaches for a while.  She has had increased frequency in the last 6 months with 2-3 headaches per month with good response to the Treximet.  She had tried that Cephaly  device but complains she doesn't have time to 2 minutes with a 2-year-old.  Previous note: 10-3-13: This is a 33-year-old white female who presents for followup for her migraine headaches. Patient was previously seen by Dr. Nicolle Chatman who is no longer here. Patient describes a 20 year history of migraine headaches. She describes right or left temporal pain radiating to the hand eye causing aching/deep pain in the eye radiating into the neck. Headache intensity is 5-6/10 associated with nausea, photophobia, sonophobia and lasting all day. The patient has no aura. She generally takes a quarter tablet of Treximet with relief in 30-60 minutes. She takes Cymbalta 30 mg and nortriptyline 25 mg h.s. for migraine prevention. Overall she has been doing quite well with this medication regimen and is here predominantly to establish with a new neurologist.       Therapies Tried & Response:   Pamelor - unsure   Treximet - helped   Cefaly -   Sumatriptan -   Cymbalta - unsure  Zoloft -   Tramadol -   Excedrin -   Effexor - unsure   Topiramate - unsure, cannot try again hx of kidney stone  Zomig NS -  "  Zomig PO -   Lamictal   Elavil - weight gain   Magnesium -   relpax -   Paxil - no   Midrin - ?  Darvocet -   Nadolol  Prednisone -    Baclofen -   Zomig PO -   Botox -helping   Aimovig - given today     Current Medications:    baclofen (LIORESAL) 10 MG tablet, Take 1 tablet (10 mg total) by mouth nightly., Disp: 30 tablet, Rfl: 5    sertraline (ZOLOFT) 100 MG tablet, Take 1 tablet (100 mg total) by mouth once daily., Disp: 90 tablet, Rfl: 3    sumatriptan (IMITREX) 100 MG tablet, Take 1 tab at onset of migraine, can repeat in 2 hrs if needed. Max 2 tabs/day. Max 3 days/week., Disp: 27 tablet, Rfl: 3    erenumab-aooe (AIMOVIG AUTOINJECTOR) 70 mg/mL AtIn, Inject 2 mLs (140 mg total) into the skin every 28 days., Disp: 2 mL, Rfl: 11    Current Facility-Administered Medications:     etonogestrel Impl 68 mg, 68 mg, Subdermal, Once, Paola Tony, DEVEN    onabotulinumtoxina injection 200 Units, 200 Units, Intramuscular, Q90 Days, JUANA Hodge, 200 Units at 08/27/18 1545    onabotulinumtoxina injection 200 Units, 200 Units, Intramuscular, Q90 Days, JUANA Hodge    Review of Systems - as per HPI, otherwise a balanced 10 systems review is negative.    OBJECTIVE:  Vitals:  /81   Pulse 77   Ht 4' 11" (1.499 m)   Wt 72.8 kg (160 lb 7.9 oz)   BMI 32.42 kg/m²      Physical Exam:  Constitutional: she appears well-developed and well-nourished. she is well groomed. NAD   HENT:    Head: Normocephalic and atraumatic  Eyes: Conjunctivae and EOM are normal  Musculoskeletal: Normal range of motion. No joint stiffness.   Skin: Skin is warm and dry.  Psychiatric: Mood and affect are normal    Neuro: Patient is alert and oriented to person, place, and time. Language is intact and fluent.  Recent and remote memory are intact.  Normal attention and concentration.  Gait is normal.     Review of Data:   Notes from OBGYN reviewed   Labs:  No visits with results within 3 Month(s) from this visit.   Latest " known visit with results is:   Lab Visit on 05/16/2018   Component Date Value Ref Range Status    HIV 1/2 Ag/Ab 05/16/2018 Negative  Negative Final    RPR 05/16/2018 Non-reactive  Non-reactive Final    Hepatitis B Surface Ag 05/16/2018 Negative   Final    Hep B C IgM 05/16/2018 Negative   Final    Hep A IgM 05/16/2018 Negative   Final    Hepatitis C Ab 05/16/2018 Negative   Final     Imaging:  No results found for this or any previous visit.  Note: I have independently reviewed any/all imaging/labs/tests and agree with the report (s) as documented.  Any discrepancies will be as noted/demarcated by free text.  ARMAAN URIBE 1/3/2019    ASSESSMENT:  1. Chronic migraine without aura without status migrainosus, not intractable    2. Chronic migraine    3. Anxiety    4. Obesity (BMI 30.0-34.9)      PLAN:  - D/C Botox   - Start Aimovig 140 mg SQ monthly injections  - Continue Zoloft 100 mg daily   - Continue tracking headaches   - Discussed goals of therapy are to decrease the frequency, intensity, and duration of headaches  - Anxiety - well controlled, per patient, on Zoloft 100 mg daily   - Obesity - encouraged her to begin exercising regularly and following a healthy diet   - RTC in 3-6 months or sooner if needed      Orders Placed This Encounter    erenumab-aooe (AIMOVIG AUTOINJECTOR) 70 mg/mL AtIn    sumatriptan (IMITREX) 100 MG tablet    sertraline (ZOLOFT) 100 MG tablet     I spent 26 minutes face-to-face with the patient with >50% of the time spent with counseling and education regarding:  - results of data, diagnosis, and recommendations stated above  - risks, benefits, and potential side effects of Aimovig discussed   - alternative treatment options including Emgality vs Propranolol offered   - importance of healthy diet, regular exercise and sleep hygiene in the treatment of headaches     Questions and concerns were sought and answered to the patient's stated verbal satisfaction.  The patient verbalizes  understanding and agreement with the above stated treatment plan.     CC: MD Makayla Bhatt, FNP-C  [facility]   [phone]    Dr. Perez was available during today's encounter.

## 2019-01-09 NOTE — TELEPHONE ENCOUNTER
DOCUMENTATION ONLY  The prior authorization request for Aimovig 140mg was denied by the patient's insurance.   **HOWEVER, AIMOVIG 70MG PER 28 DAYS APPROVED THRU 4/8/19**  Forwarded to the clinical pharmacist for review. 1/9 11:48am ARR

## 2019-01-15 ENCOUNTER — PATIENT MESSAGE (OUTPATIENT)
Dept: NEUROLOGY | Facility: CLINIC | Age: 39
End: 2019-01-15

## 2019-01-21 ENCOUNTER — OFFICE VISIT (OUTPATIENT)
Dept: OBSTETRICS AND GYNECOLOGY | Facility: CLINIC | Age: 39
End: 2019-01-21
Payer: COMMERCIAL

## 2019-01-21 VITALS — HEIGHT: 59 IN | BODY MASS INDEX: 32.49 KG/M2 | WEIGHT: 161.19 LBS

## 2019-01-21 DIAGNOSIS — Z12.4 PAP SMEAR FOR CERVICAL CANCER SCREENING: ICD-10-CM

## 2019-01-21 DIAGNOSIS — Z97.5 NEXPLANON IN PLACE: ICD-10-CM

## 2019-01-21 DIAGNOSIS — Z01.419 WELL FEMALE EXAM WITH ROUTINE GYNECOLOGICAL EXAM: Primary | ICD-10-CM

## 2019-01-21 PROCEDURE — 99395 PREV VISIT EST AGE 18-39: CPT | Mod: S$GLB,,, | Performed by: NURSE PRACTITIONER

## 2019-01-21 PROCEDURE — 88175 CYTOPATH C/V AUTO FLUID REDO: CPT

## 2019-01-21 PROCEDURE — 87624 HPV HI-RISK TYP POOLED RSLT: CPT

## 2019-01-21 PROCEDURE — 99999 PR PBB SHADOW E&M-EST. PATIENT-LVL III: ICD-10-PCS | Mod: PBBFAC,,, | Performed by: NURSE PRACTITIONER

## 2019-01-21 PROCEDURE — 99395 PR PREVENTIVE VISIT,EST,18-39: ICD-10-PCS | Mod: S$GLB,,, | Performed by: NURSE PRACTITIONER

## 2019-01-21 PROCEDURE — 99999 PR PBB SHADOW E&M-EST. PATIENT-LVL III: CPT | Mod: PBBFAC,,, | Performed by: NURSE PRACTITIONER

## 2019-01-21 NOTE — PROGRESS NOTES
CC: Annual  HPI: Pt is a 38 y.o.  female who presents for routine annual exam. She uses Nexplanon for contraception. Very happy with it. No bleeding. She can palpate it in her left arm. She does not want STD screening. No problems or concerns today.     Last pap in 2016 WNL    ROS:  GENERAL: Feeling well overall. Denies fever or chills.   SKIN: Denies rash or lesions.   HEAD: Denies head injury or headache.   NODES: Denies enlarged lymph nodes.   CHEST: Denies chest pain or shortness of breath.   CARDIOVASCULAR: Denies palpitations or left sided chest pain.   ABDOMEN: No abdominal pain, constipation, diarrhea, nausea, vomiting or rectal bleeding.   URINARY: No dysuria, hematuria, or burning on urination.  REPRODUCTIVE: See HPI.   BREASTS: Denies pain, lumps, or nipple discharge.   HEMATOLOGIC: No easy bruisability or excessive bleeding.   MUSCULOSKELETAL: Denies joint pain or swelling.   NEUROLOGIC: Denies syncope or weakness.   PSYCHIATRIC: Denies depression, anxiety or mood swings.    PE:   APPEARANCE: Well nourished, well developed, White female in no acute distress.  NODES: no cervical, supraclavicular, or inguinal lymphadenopathy  BREASTS: Symmetrical, no skin changes or visible lesions. No palpable masses, nipple discharge or adenopathy bilaterally.  ABDOMEN: Soft. No tenderness or masses. No distention. No hernias palpated. No CVA tenderness.  VULVA: No lesions. Normal external female genitalia.  URETHRAL MEATUS: Normal size and location, no lesions, no prolapse.  URETHRA: No masses, tenderness, or prolapse.  VAGINA: Moist. No lesions or lacerations noted. No abnormal discharge present. No odor present.   CERVIX: No lesions or discharge. No cervical motion tenderness.   UTERUS: Normal size, regular shape, mobile, non-tender.  ADNEXA: No tenderness. No fullness or masses palpated in the adnexal regions.   ANUS PERINEUM: Normal.      Diagnosis:  1. Well female exam with routine gynecological exam    2.  Nexplanon in place-expires 5/2021    3. Pap smear for cervical cancer screening        Plan:     Orders Placed This Encounter    HPV High Risk Genotypes, PCR    Liquid-based pap smear, screening     1. Pap updated  2. MMG next year  3. Nexplanon expires in 2021    Patient was counseled today on the new ACS guidelines for cervical cytology screening as well as the current recommendations for breast cancer screening. She was counseled to follow up with her PCP for other routine health maintenance. Counseling session lasted approximately 10 minutes, and all her questions were answered.    Follow-up with me in 1 year for routine exam; pap in 3 years.

## 2019-01-22 ENCOUNTER — PATIENT MESSAGE (OUTPATIENT)
Dept: PHARMACY | Facility: CLINIC | Age: 39
End: 2019-01-22

## 2019-01-22 NOTE — TELEPHONE ENCOUNTER
Initial Aimovig consult completed on  . Aimovig 70mg will be shipped on  to arrive at patient's home on  via [employer]. $ 0.00 copay and permission to charge CC on file. Patient intends to start Aimovig on  . Address confirmed. Confirmed 2 patient identifiers - name and . Therapy Appropriate.    --Injection experience: no  Informed patient on online injection video on  website. [[email or web address]    Counseled patient on administration directions:  - Inject 70mg into the skin every 28 days.   - Take out of the refrigerator 30-60 minutes prior to injection.  - Wash hands before and after injection.  - Monthly RX will come with gauze, bandaids, and alcohol swabs.  - Patient may inject in either the tops of the thighs, abdomen- but at least 2 inches away from belly button, or the outer part of her upper arm (with assistance). If injecting 2 pens, use 2 different injection sites. Patient was instructed to rotate injections sites.  - Patient is to wipe down the injection site with the alcohol pad, wait to dry.    - Remove white cap from pen  - Gently squeeze OR stretch the area of the cleaned skin and hold it firmly.  Place the pen flat against the skin then push down on the purple button and release - there will be an initial click; in 10-15 seconds you will hear a second click and the window will go from from clear to yellow, indicating injection is complete.  - Patient should rotate injection sites.   - Patient will use sharps container; once full, per [place] law, she/ he may lock the sharps container and place in trash. Pharmacy will replace the sharps at no additional charge.    Patient was counseled on possible side effects:  - Injection site reaction: redness, soreness, itching, bruising, which should resolve within 3-5 days.  - constipation  - muscle spasms    Advised to keep a calendar to stay compliant. Consultation included: indication; goals of treatment (may take 3 months  to reach full efficacy); administration; storage and handling; side effects; how to handle side effects; the importance of compliance; the importance of keeping all follow up appointments.  Patient understands to report any medication changes to [facility] and provider. All questions answered and addressed to patients satisfaction. I will f/u with patient in 7-10 days from start, [facility] to contact patient in 3 weeks for refills.

## 2019-01-23 LAB
HPV HR 12 DNA CVX QL NAA+PROBE: POSITIVE
HPV16 AG SPEC QL: POSITIVE
HPV18 DNA SPEC QL NAA+PROBE: NEGATIVE

## 2019-01-29 ENCOUNTER — TELEPHONE (OUTPATIENT)
Dept: OBSTETRICS AND GYNECOLOGY | Facility: CLINIC | Age: 39
End: 2019-01-29

## 2019-01-29 PROBLEM — R87.618 PAP SMEAR ABNORMALITY OF CERVIX/HUMAN PAPILLOMAVIRUS (HPV) POSITIVE: Status: ACTIVE | Noted: 2019-01-29

## 2019-01-30 ENCOUNTER — TELEPHONE (OUTPATIENT)
Dept: OBSTETRICS AND GYNECOLOGY | Facility: CLINIC | Age: 39
End: 2019-01-30

## 2019-01-30 ENCOUNTER — PATIENT MESSAGE (OUTPATIENT)
Dept: OBSTETRICS AND GYNECOLOGY | Facility: CLINIC | Age: 39
End: 2019-01-30

## 2019-01-30 NOTE — TELEPHONE ENCOUNTER
----- Message from Paola Tony NP sent at 1/29/2019  3:05 PM CST -----  Regarding: needs colpo with MD  Pt needs a colpo scheduled. Has never seen a MD here.

## 2019-02-01 ENCOUNTER — PATIENT MESSAGE (OUTPATIENT)
Dept: NEUROLOGY | Facility: CLINIC | Age: 39
End: 2019-02-01

## 2019-02-11 ENCOUNTER — PROCEDURE VISIT (OUTPATIENT)
Dept: OBSTETRICS AND GYNECOLOGY | Facility: CLINIC | Age: 39
End: 2019-02-11
Payer: COMMERCIAL

## 2019-02-11 VITALS
HEIGHT: 59 IN | DIASTOLIC BLOOD PRESSURE: 72 MMHG | BODY MASS INDEX: 32.36 KG/M2 | SYSTOLIC BLOOD PRESSURE: 112 MMHG | WEIGHT: 160.5 LBS

## 2019-02-11 DIAGNOSIS — B97.7 HPV IN FEMALE: Primary | ICD-10-CM

## 2019-02-11 PROCEDURE — 57500 PR BIOPSY CERVIX, 1 OR MORE, OR EXCISION OF LESION: ICD-10-PCS | Mod: S$GLB,,, | Performed by: OBSTETRICS & GYNECOLOGY

## 2019-02-11 PROCEDURE — 88305 TISSUE EXAM BY PATHOLOGIST: CPT | Mod: 26,,, | Performed by: PATHOLOGY

## 2019-02-11 PROCEDURE — 57500 BIOPSY OF CERVIX: CPT | Mod: S$GLB,,, | Performed by: OBSTETRICS & GYNECOLOGY

## 2019-02-11 PROCEDURE — 88305 TISSUE EXAM BY PATHOLOGIST: CPT | Performed by: PATHOLOGY

## 2019-02-11 PROCEDURE — 88305 TISSUE SPECIMEN TO PATHOLOGY, OBSTETRICS/GYNECOLOGY: ICD-10-PCS | Mod: 26,,, | Performed by: PATHOLOGY

## 2019-02-11 NOTE — PROCEDURES
COLPOSCOPY:    Kelly Thomason is a 38 y.o. female   presents for colposcopy.  No LMP recorded. Patient is not currently having periods (Reason: Birth Control)..  Her most recent pap smear shows neg pap, HPV 16 and low grade positive.      The abnormal test findings were discussed, as well as HPV infection, need for colposcopy and possible biopsies to determine the plan of care, treatments available, the minimal risk of bleeding and infection with colposcopy, and alternatives to colposcopy and she agrees to proceed.      UPT is negative    COLPOSCOPY EXAM:   TIME OUT PERFORMED.     no visible lesions    Biopsy was taken at 3, 6, 12 o'clock.  ECC was performed    Hemostasis was adequate with application of Monsel's solution.  The speculum was removed.  The patient did tolerate the procedure well.    All collected specimens sent to pathology for histologic analysis.    Post-colposcopy counseling:  The patient was instructed to manage post-colposcopy cramping with NSAIDs or Tylenol, or with a prescription per the medication card.  Avoid intercourse, douching, or tampons in the vagina for at least 2-3 days.  Expect a clumpy blackish discharge due to Monsel's solution application for several days.  Report heavy bleeding, worsening pain or pain that does not respond to above medications, or foul-smelling vaginal discharge. HPV vaccine recommended according to FDA age guidelines.  Importance of follow-up stressed.      Follow up based on colposcopy results.

## 2019-02-14 ENCOUNTER — TELEPHONE (OUTPATIENT)
Dept: PHARMACY | Facility: CLINIC | Age: 39
End: 2019-02-14

## 2019-02-19 ENCOUNTER — PATIENT MESSAGE (OUTPATIENT)
Dept: OBSTETRICS AND GYNECOLOGY | Facility: CLINIC | Age: 39
End: 2019-02-19

## 2019-02-19 ENCOUNTER — OFFICE VISIT (OUTPATIENT)
Dept: OBSTETRICS AND GYNECOLOGY | Facility: CLINIC | Age: 39
End: 2019-02-19
Payer: COMMERCIAL

## 2019-02-19 VITALS
WEIGHT: 160.69 LBS | HEIGHT: 59 IN | DIASTOLIC BLOOD PRESSURE: 72 MMHG | BODY MASS INDEX: 32.4 KG/M2 | SYSTOLIC BLOOD PRESSURE: 116 MMHG

## 2019-02-19 DIAGNOSIS — N76.0 ACUTE VAGINITIS: Primary | ICD-10-CM

## 2019-02-19 LAB
CANDIDA RRNA VAG QL PROBE: NEGATIVE
G VAGINALIS RRNA GENITAL QL PROBE: POSITIVE
T VAGINALIS RRNA GENITAL QL PROBE: NEGATIVE

## 2019-02-19 PROCEDURE — 87480 CANDIDA DNA DIR PROBE: CPT

## 2019-02-19 PROCEDURE — 99999 PR PBB SHADOW E&M-EST. PATIENT-LVL III: ICD-10-PCS | Mod: PBBFAC,,, | Performed by: NURSE PRACTITIONER

## 2019-02-19 PROCEDURE — 3008F PR BODY MASS INDEX (BMI) DOCUMENTED: ICD-10-PCS | Mod: CPTII,S$GLB,, | Performed by: NURSE PRACTITIONER

## 2019-02-19 PROCEDURE — 3008F BODY MASS INDEX DOCD: CPT | Mod: CPTII,S$GLB,, | Performed by: NURSE PRACTITIONER

## 2019-02-19 PROCEDURE — 87510 GARDNER VAG DNA DIR PROBE: CPT

## 2019-02-19 PROCEDURE — 99213 PR OFFICE/OUTPT VISIT, EST, LEVL III, 20-29 MIN: ICD-10-PCS | Mod: S$GLB,,, | Performed by: NURSE PRACTITIONER

## 2019-02-19 PROCEDURE — 99213 OFFICE O/P EST LOW 20 MIN: CPT | Mod: S$GLB,,, | Performed by: NURSE PRACTITIONER

## 2019-02-19 PROCEDURE — 99999 PR PBB SHADOW E&M-EST. PATIENT-LVL III: CPT | Mod: PBBFAC,,, | Performed by: NURSE PRACTITIONER

## 2019-02-19 RX ORDER — FLUCONAZOLE 200 MG/1
200 TABLET ORAL
Qty: 2 TABLET | Refills: 0 | Status: SHIPPED | OUTPATIENT
Start: 2019-02-19 | End: 2019-12-02

## 2019-02-19 NOTE — PROGRESS NOTES
"Kelly Thomason is a 38 y.o. female  presents with complaint of intermittent vaginal itching and general "soreness". Hx of BV, unsure if this it today. Denies odor. Had a colpo done last week with Dr. Jeansonne, results are pending.    Past Medical History:   Diagnosis Date    Abnormal Pap smear of cervix     Depression     Head ache     Hives     Kidney stones     Urticaria      Past Surgical History:   Procedure Laterality Date    CYSTOSCOPY N/A 2013    [facility]    EXTRACTION - STONE Left 2013    [facility] at [facility]    INSERTION, STENT, URETER Left 2013    [facility] at [facility]    KIDNEY STONE SURGERY      LITHOTRIPSY, USING LASER Left 2013    Per[facility] at [facility]    URETEROSCOPY Bilateral 2013    [facility] at [facility]    WISDOM TOOTH EXTRACTION       Social History     Tobacco Use    Smoking status: Never Smoker    Smokeless tobacco: Never Used   Substance Use Topics    Alcohol use: Yes     Comment: Rare    Drug use: No     Family History   Problem Relation Age of Onset    Hyperlipidemia Mother     Hyperlipidemia Father     Breast cancer Paternal Grandmother         Dx > 50 years    Colon cancer Paternal Grandmother     Ovarian cancer Neg Hx     Cancer Neg Hx      OB History    Para Term  AB Living   1 1 1     1   SAB TAB Ectopic Multiple Live Births         0 1      # Outcome Date GA Lbr Karlos/2nd Weight Sex Delivery Anes PTL Lv   1 Term 10/27/14 37w5d 07:00 / 00:25 3.005 kg (6 lb 10 oz) F Vag-Spont None N JOSE RAFAEL          /72   Ht 4' 11" (1.499 m)   Wt 72.9 kg (160 lb 11.5 oz)   LMP  (LMP Unknown)   BMI 32.46 kg/m²     ROS:  GENERAL: No fever, chills, fatigability or weight loss.  VULVAR: No pain, no lesions and + itching.  VAGINAL: No relaxation, " + itching, no discharge, no abnormal bleeding and no lesions.  ABDOMEN: No abdominal pain. Denies nausea. Denies vomiting. No diarrhea. No constipation  BREAST: Denies pain. No lumps. No discharge.  URINARY: No incontinence, no nocturia, no frequency and no dysuria.  CARDIOVASCULAR: No chest pain. No shortness of breath. No leg cramps.  NEUROLOGICAL: No headaches. No vision changes.    PHYSICAL EXAM:  VULVA: normal appearing vulva with no masses, tenderness or lesions   VAGINA: normal appearing vagina with normal color. Small amount of white discharge, no lesions   CERVIX: normal appearing cervix without discharge or lesions   UTERUS: uterus is normal size, shape, consistency and nontender   ADNEXA: normal adnexa in size, nontender and no masses    ASSESSMENT and PLAN:    ICD-10-CM ICD-9-CM    1. Acute vaginitis N76.0 616.10 Vaginosis Screen by DNA Probe     1. Affirm pending, rx diflucan    Patient was counseled today on vaginitis prevention including :  a. avoiding feminine products such as deoderant soaps, body wash, bubble bath, douches, scented toilet paper, deoderant tampons or pads, feminine wipes, chronic pad use, etc.  b. avoiding other vulvovaginal irritants such as long hot baths, humidity, tight, synthetic clothing, chlorine and sitting around in wet bathing suits  c. wearing cotton underwear, avoiding thong underwear and no underwear to bed  d. taking showers instead of baths and use a hair dryer on cool setting afterwards to dry  e. wearing cotton to exercise and shower immediately after exercise and change clothes  f. using polyurethane condoms without spermicide if sexually active and symptoms are triggered by intercourse    FOLLOW UP: PRN lack of improvement.

## 2019-02-20 RX ORDER — METRONIDAZOLE 500 MG/1
500 TABLET ORAL EVERY 12 HOURS
Qty: 14 TABLET | Refills: 0 | Status: SHIPPED | OUTPATIENT
Start: 2019-02-20 | End: 2019-02-27

## 2019-03-11 ENCOUNTER — TELEPHONE (OUTPATIENT)
Dept: PHARMACY | Facility: CLINIC | Age: 39
End: 2019-03-11

## 2019-04-09 ENCOUNTER — TELEPHONE (OUTPATIENT)
Dept: PHARMACY | Facility: CLINIC | Age: 39
End: 2019-04-09

## 2019-05-08 ENCOUNTER — TELEPHONE (OUTPATIENT)
Dept: PHARMACY | Facility: CLINIC | Age: 39
End: 2019-05-08

## 2019-05-21 DIAGNOSIS — G43.709 CHRONIC MIGRAINE WITHOUT AURA WITHOUT STATUS MIGRAINOSUS, NOT INTRACTABLE: ICD-10-CM

## 2019-05-21 DIAGNOSIS — R51.9 CHRONIC DAILY HEADACHE: ICD-10-CM

## 2019-05-22 RX ORDER — BACLOFEN 10 MG/1
TABLET ORAL
Qty: 30 TABLET | Refills: 5 | Status: SHIPPED | OUTPATIENT
Start: 2019-05-22 | End: 2019-12-02

## 2019-06-05 ENCOUNTER — TELEPHONE (OUTPATIENT)
Dept: PHARMACY | Facility: CLINIC | Age: 39
End: 2019-06-05

## 2019-07-05 ENCOUNTER — PATIENT MESSAGE (OUTPATIENT)
Dept: INTERNAL MEDICINE | Facility: CLINIC | Age: 39
End: 2019-07-05

## 2019-07-05 ENCOUNTER — TELEPHONE (OUTPATIENT)
Dept: PHARMACY | Facility: CLINIC | Age: 39
End: 2019-07-05

## 2019-07-05 DIAGNOSIS — Z00.00 ROUTINE PHYSICAL EXAMINATION: Primary | ICD-10-CM

## 2019-07-05 NOTE — TELEPHONE ENCOUNTER
appt for labs have been made for Tuesday 7/9 at 8:30am FYI this will be after the appy on 7/8 this is the next time pt can make in the morning due to fasting is this ok please advise thank you       Left v/m for pt about lab appt on 7/9

## 2019-07-06 ENCOUNTER — LAB VISIT (OUTPATIENT)
Dept: LAB | Facility: HOSPITAL | Age: 39
End: 2019-07-06
Attending: INTERNAL MEDICINE
Payer: COMMERCIAL

## 2019-07-06 DIAGNOSIS — Z00.00 ROUTINE PHYSICAL EXAMINATION: ICD-10-CM

## 2019-07-06 LAB
ANION GAP SERPL CALC-SCNC: 6 MMOL/L (ref 8–16)
BUN SERPL-MCNC: 17 MG/DL (ref 6–20)
CALCIUM SERPL-MCNC: 9.3 MG/DL (ref 8.7–10.5)
CHLORIDE SERPL-SCNC: 108 MMOL/L (ref 95–110)
CHOLEST SERPL-MCNC: 138 MG/DL (ref 120–199)
CHOLEST/HDLC SERPL: 3.1 {RATIO} (ref 2–5)
CO2 SERPL-SCNC: 26 MMOL/L (ref 23–29)
CREAT SERPL-MCNC: 0.8 MG/DL (ref 0.5–1.4)
EST. GFR  (AFRICAN AMERICAN): >60 ML/MIN/1.73 M^2
EST. GFR  (NON AFRICAN AMERICAN): >60 ML/MIN/1.73 M^2
GLUCOSE SERPL-MCNC: 93 MG/DL (ref 70–110)
HDLC SERPL-MCNC: 44 MG/DL (ref 40–75)
HDLC SERPL: 31.9 % (ref 20–50)
LDLC SERPL CALC-MCNC: 66.6 MG/DL (ref 63–159)
NONHDLC SERPL-MCNC: 94 MG/DL
POTASSIUM SERPL-SCNC: 4.6 MMOL/L (ref 3.5–5.1)
SODIUM SERPL-SCNC: 140 MMOL/L (ref 136–145)
TRIGL SERPL-MCNC: 137 MG/DL (ref 30–150)

## 2019-07-06 PROCEDURE — 80061 LIPID PANEL: CPT

## 2019-07-06 PROCEDURE — 36415 COLL VENOUS BLD VENIPUNCTURE: CPT

## 2019-07-06 PROCEDURE — 80048 BASIC METABOLIC PNL TOTAL CA: CPT

## 2019-07-08 ENCOUNTER — OFFICE VISIT (OUTPATIENT)
Dept: INTERNAL MEDICINE | Facility: CLINIC | Age: 39
End: 2019-07-08
Payer: COMMERCIAL

## 2019-07-08 VITALS
DIASTOLIC BLOOD PRESSURE: 82 MMHG | HEIGHT: 59 IN | HEART RATE: 83 BPM | BODY MASS INDEX: 31.2 KG/M2 | OXYGEN SATURATION: 96 % | WEIGHT: 154.75 LBS | SYSTOLIC BLOOD PRESSURE: 122 MMHG

## 2019-07-08 DIAGNOSIS — G43.709 CHRONIC MIGRAINE WITHOUT AURA WITHOUT STATUS MIGRAINOSUS, NOT INTRACTABLE: ICD-10-CM

## 2019-07-08 DIAGNOSIS — Z00.00 ROUTINE PHYSICAL EXAMINATION: Primary | ICD-10-CM

## 2019-07-08 DIAGNOSIS — F32.A DEPRESSION, UNSPECIFIED DEPRESSION TYPE: ICD-10-CM

## 2019-07-08 PROCEDURE — 99395 PREV VISIT EST AGE 18-39: CPT | Mod: S$GLB,,, | Performed by: INTERNAL MEDICINE

## 2019-07-08 PROCEDURE — 99395 PR PREVENTIVE VISIT,EST,18-39: ICD-10-PCS | Mod: S$GLB,,, | Performed by: INTERNAL MEDICINE

## 2019-07-08 PROCEDURE — 99999 PR PBB SHADOW E&M-EST. PATIENT-LVL III: CPT | Mod: PBBFAC,,, | Performed by: INTERNAL MEDICINE

## 2019-07-08 PROCEDURE — 99999 PR PBB SHADOW E&M-EST. PATIENT-LVL III: ICD-10-PCS | Mod: PBBFAC,,, | Performed by: INTERNAL MEDICINE

## 2019-07-08 NOTE — PROGRESS NOTES
Subjective:       Patient ID: Kelly Thomason is a 38 y.o. female.    Chief Complaint: Annual Exam    HPI:  38-year-old female comes in for annual exam still has not seen me in a few years but has seen gynecology and seen other providers for urgent issues we did a chemistry and a lipid profile which were both very stable.  She has been exercising and working on diet and losing weight.  She has a 4-year-old daughter who is going to start pre-K in August.  Other than occasional fatigue which she blames on being a single mom, working and raising the 4-year-old daughter, she says she feels okay.  She denies any change in her menstrual cycles.  She donates blood periodically. Denies snoring.     Review of Systems   Constitutional: Negative for activity change, appetite change, chills, fever and unexpected weight change.   HENT: Negative for hearing loss, nosebleeds, rhinorrhea, sinus pain, sore throat and trouble swallowing.    Eyes: Negative for discharge and visual disturbance.   Respiratory: Negative for cough, chest tightness, shortness of breath and wheezing.    Cardiovascular: Negative for chest pain, palpitations and leg swelling.   Gastrointestinal: Negative for abdominal pain, blood in stool, constipation, diarrhea and vomiting.   Endocrine: Negative for polydipsia and polyuria.   Genitourinary: Negative for difficulty urinating, dysuria, hematuria and menstrual problem.   Musculoskeletal: Negative for arthralgias, joint swelling, neck pain and neck stiffness.   Skin: Negative for pallor and rash.   Neurological: Negative for weakness and headaches.   Psychiatric/Behavioral: Negative for confusion, dysphoric mood and suicidal ideas. The patient is not nervous/anxious.        Objective:      Physical Exam   Constitutional: She is oriented to person, place, and time. She appears well-developed and well-nourished. No distress.   HENT:   Head: Normocephalic and atraumatic.   Right Ear: External ear normal.   Left  Ear: External ear normal.   Mouth/Throat: Oropharynx is clear and moist. No oropharyngeal exudate.   Eyes: Pupils are equal, round, and reactive to light. Conjunctivae are normal. No scleral icterus.   Neck: Normal range of motion. Neck supple. No thyromegaly present.   Cardiovascular: Normal rate and regular rhythm.   No murmur heard.  Pulmonary/Chest: Effort normal and breath sounds normal. She has no wheezes.   Abdominal: Soft. Bowel sounds are normal. She exhibits no distension. There is no tenderness.   Musculoskeletal: She exhibits no tenderness.   Lymphadenopathy:     She has no cervical adenopathy.   Neurological: She is alert and oriented to person, place, and time.   Skin: No rash noted.   Psychiatric: She has a normal mood and affect.       Assessment:       1. Routine physical examination    2. Depression, unspecified depression type    3. Chronic migraine without aura without status migrainosus, not intractable        Plan:        was seen today for annual exam.    Diagnoses and all orders for this visit:    Routine physical examination    Depression, unspecified depression type    Chronic migraine without aura without status migrainosus, not intractable          Follow-up annually

## 2019-08-01 ENCOUNTER — TELEPHONE (OUTPATIENT)
Dept: PHARMACY | Facility: CLINIC | Age: 39
End: 2019-08-01

## 2019-08-13 ENCOUNTER — TELEPHONE (OUTPATIENT)
Dept: PHARMACY | Facility: CLINIC | Age: 39
End: 2019-08-13

## 2019-08-26 DIAGNOSIS — Z12.39 BREAST CANCER SCREENING: ICD-10-CM

## 2019-09-03 ENCOUNTER — TELEPHONE (OUTPATIENT)
Dept: PHARMACY | Facility: CLINIC | Age: 39
End: 2019-09-03

## 2019-09-06 ENCOUNTER — PATIENT MESSAGE (OUTPATIENT)
Dept: INTERNAL MEDICINE | Facility: CLINIC | Age: 39
End: 2019-09-06

## 2019-09-27 ENCOUNTER — TELEPHONE (OUTPATIENT)
Dept: PHARMACY | Facility: CLINIC | Age: 39
End: 2019-09-27

## 2019-10-04 ENCOUNTER — HOSPITAL ENCOUNTER (OUTPATIENT)
Dept: RADIOLOGY | Facility: HOSPITAL | Age: 39
Discharge: HOME | End: 2019-10-04
Attending: INTERNAL MEDICINE
Payer: COMMERCIAL

## 2019-10-04 DIAGNOSIS — Z12.39 BREAST CANCER SCREENING: ICD-10-CM

## 2019-10-04 PROCEDURE — 77063 MAMMO DIGITAL SCREENING BILAT WITH TOMOSYNTHESIS_CAD: ICD-10-PCS | Mod: 26,,, | Performed by: RADIOLOGY

## 2019-10-04 PROCEDURE — 77067 SCR MAMMO BI INCL CAD: CPT | Mod: 26,,, | Performed by: RADIOLOGY

## 2019-10-04 PROCEDURE — 77067 SCR MAMMO BI INCL CAD: CPT | Mod: TC

## 2019-10-04 PROCEDURE — 77067 MAMMO DIGITAL SCREENING BILAT WITH TOMOSYNTHESIS_CAD: ICD-10-PCS | Mod: 26,,, | Performed by: RADIOLOGY

## 2019-10-04 PROCEDURE — 77063 BREAST TOMOSYNTHESIS BI: CPT | Mod: 26,,, | Performed by: RADIOLOGY

## 2019-10-08 ENCOUNTER — PATIENT MESSAGE (OUTPATIENT)
Dept: INTERNAL MEDICINE | Facility: CLINIC | Age: 39
End: 2019-10-08

## 2019-10-08 DIAGNOSIS — F41.9 ANXIETY: ICD-10-CM

## 2019-10-09 ENCOUNTER — PATIENT MESSAGE (OUTPATIENT)
Dept: NEUROLOGY | Facility: CLINIC | Age: 39
End: 2019-10-09

## 2019-10-09 ENCOUNTER — HOSPITAL ENCOUNTER (OUTPATIENT)
Dept: RADIOLOGY | Facility: HOSPITAL | Age: 39
Discharge: HOME | End: 2019-10-09
Attending: INTERNAL MEDICINE
Payer: COMMERCIAL

## 2019-10-09 ENCOUNTER — PATIENT MESSAGE (OUTPATIENT)
Dept: INTERNAL MEDICINE | Facility: CLINIC | Age: 39
End: 2019-10-09

## 2019-10-09 DIAGNOSIS — R92.8 ABNORMAL MAMMOGRAM: ICD-10-CM

## 2019-10-09 PROCEDURE — 77061 MAMMO DIGITAL DIAGNOSTIC LEFT WITH TOMOSYNTHESIS_CAD: ICD-10-PCS | Mod: 26,LT,, | Performed by: RADIOLOGY

## 2019-10-09 PROCEDURE — 76642 ULTRASOUND BREAST LIMITED: CPT | Mod: 26,LT,, | Performed by: RADIOLOGY

## 2019-10-09 PROCEDURE — 77061 BREAST TOMOSYNTHESIS UNI: CPT | Mod: 26,LT,, | Performed by: RADIOLOGY

## 2019-10-09 PROCEDURE — 77065 DX MAMMO INCL CAD UNI: CPT | Mod: TC,PO,LT

## 2019-10-09 PROCEDURE — 77065 DX MAMMO INCL CAD UNI: CPT | Mod: 26,LT,, | Performed by: RADIOLOGY

## 2019-10-09 PROCEDURE — 77061 BREAST TOMOSYNTHESIS UNI: CPT | Mod: TC,PO,LT

## 2019-10-09 PROCEDURE — 76642 US BREAST LEFT LIMITED: ICD-10-PCS | Mod: 26,LT,, | Performed by: RADIOLOGY

## 2019-10-09 PROCEDURE — 77065 MAMMO DIGITAL DIAGNOSTIC LEFT WITH TOMOSYNTHESIS_CAD: ICD-10-PCS | Mod: 26,LT,, | Performed by: RADIOLOGY

## 2019-10-09 PROCEDURE — 76642 ULTRASOUND BREAST LIMITED: CPT | Mod: TC,PO,LT

## 2019-10-09 RX ORDER — SERTRALINE HYDROCHLORIDE 100 MG/1
TABLET, FILM COATED ORAL
Qty: 90 TABLET | Refills: 0 | Status: SHIPPED | OUTPATIENT
Start: 2019-10-09 | End: 2019-12-02 | Stop reason: SDUPTHER

## 2019-10-23 ENCOUNTER — TELEPHONE (OUTPATIENT)
Dept: PHARMACY | Facility: CLINIC | Age: 39
End: 2019-10-23

## 2019-11-18 ENCOUNTER — OFFICE VISIT (OUTPATIENT)
Dept: INTERNAL MEDICINE | Facility: CLINIC | Age: 39
End: 2019-11-18
Payer: COMMERCIAL

## 2019-11-18 VITALS
SYSTOLIC BLOOD PRESSURE: 118 MMHG | DIASTOLIC BLOOD PRESSURE: 82 MMHG | BODY MASS INDEX: 31.55 KG/M2 | OXYGEN SATURATION: 98 % | HEART RATE: 81 BPM | HEIGHT: 59 IN | WEIGHT: 156.5 LBS

## 2019-11-18 DIAGNOSIS — M54.9 UPPER BACK PAIN: Primary | ICD-10-CM

## 2019-11-18 DIAGNOSIS — M54.6 THORACIC SPINE PAIN: ICD-10-CM

## 2019-11-18 DIAGNOSIS — G89.29 CHRONIC BILATERAL THORACIC BACK PAIN: ICD-10-CM

## 2019-11-18 DIAGNOSIS — M54.6 CHRONIC BILATERAL THORACIC BACK PAIN: ICD-10-CM

## 2019-11-18 PROCEDURE — 99214 OFFICE O/P EST MOD 30 MIN: CPT | Mod: S$GLB,,, | Performed by: INTERNAL MEDICINE

## 2019-11-18 PROCEDURE — 99999 PR PBB SHADOW E&M-EST. PATIENT-LVL IV: CPT | Mod: PBBFAC,,, | Performed by: INTERNAL MEDICINE

## 2019-11-18 PROCEDURE — 99214 PR OFFICE/OUTPT VISIT, EST, LEVL IV, 30-39 MIN: ICD-10-PCS | Mod: S$GLB,,, | Performed by: INTERNAL MEDICINE

## 2019-11-18 PROCEDURE — 3008F BODY MASS INDEX DOCD: CPT | Mod: CPTII,S$GLB,, | Performed by: INTERNAL MEDICINE

## 2019-11-18 PROCEDURE — 3008F PR BODY MASS INDEX (BMI) DOCUMENTED: ICD-10-PCS | Mod: CPTII,S$GLB,, | Performed by: INTERNAL MEDICINE

## 2019-11-18 PROCEDURE — 99999 PR PBB SHADOW E&M-EST. PATIENT-LVL IV: ICD-10-PCS | Mod: PBBFAC,,, | Performed by: INTERNAL MEDICINE

## 2019-11-18 NOTE — PROGRESS NOTES
Subjective:       Patient ID: Kelly Thomason is a 39 y.o. female.    Chief Complaint: Cervical Spine Pain (C-spine) (Started 4-5mo. has slowly been getting worse)    HPI:  Patient complains of a few months of upper back discomfort.  She has seen a chiropractor which gave her some treatments but did not find that it helped much.  It does not involve the neck.  It is worse with the head bent forward and varies with posture.  No rash.  No radiation to the arms.  She has tried anti-inflammatories and Tylenol.  The chiropractor did x-rays but told her it was time for more imaging.    Back Pain   This is a chronic problem. The current episode started more than 1 month ago. The problem occurs 2 to 4 times per day. The problem has been waxing and waning since onset. The pain is present in the thoracic spine. The quality of the pain is described as aching and burning. The pain does not radiate. The pain is at a severity of 5/10. The pain is moderate. The pain is the same all the time. The symptoms are aggravated by bending, position, lying down, stress and twisting. Stiffness is present all day. Pertinent negatives include no abdominal pain, bladder incontinence, bowel incontinence, chest pain, dysuria, fever, headaches, leg pain, numbness, paresis, paresthesias, pelvic pain, perianal numbness, tingling, weakness or weight loss. Risk factors include lack of exercise, obesity, poor posture and history of renal stones. She has tried NSAIDs, bed rest, chiropractic manipulation, heat, home exercises, ice and walking for the symptoms. The treatment provided mild relief.     Review of Systems   Constitutional: Negative for fever and weight loss.   Cardiovascular: Negative for chest pain.   Gastrointestinal: Negative for abdominal pain and bowel incontinence.   Genitourinary: Negative for bladder incontinence, dysuria, hematuria and pelvic pain.   Musculoskeletal: Positive for back pain.   Neurological: Negative for tingling,  weakness, numbness, headaches and paresthesias.       Objective:      Physical Exam   Constitutional: She is oriented to person, place, and time. She appears well-developed and well-nourished.   Pulmonary/Chest: Effort normal and breath sounds normal.   Abdominal: Soft. Bowel sounds are normal.   Musculoskeletal: She exhibits tenderness.        Arms:  Pain as approximately from about T2 down to T6, the musculature on both sides of the spine.  Worse with rounding the back and extending the chest.  No tenderness with otherwise moving the shoulders.   Neurological: She is alert and oriented to person, place, and time.   Skin: Capillary refill takes less than 2 seconds. No rash noted.   Psychiatric: She has a normal mood and affect. Her behavior is normal.       Assessment:       1. Upper back pain    2. Thoracic spine pain    3. Chronic bilateral thoracic back pain        Plan:        was seen today for cervical spine pain (c-spine).    Diagnoses and all orders for this visit:    Upper back pain  -     MRI Thoracic Spine Without Contrast; Future    Thoracic spine pain  -     MRI Thoracic Spine Without Contrast; Future    Chronic bilateral thoracic back pain  -     MRI Thoracic Spine Without Contrast; Future        review studies and consider back and spine consult  Answers for HPI/ROS submitted by the patient on 11/15/2019   Back pain  genital pain: No

## 2019-11-20 ENCOUNTER — TELEPHONE (OUTPATIENT)
Dept: INTERNAL MEDICINE | Facility: CLINIC | Age: 39
End: 2019-11-20

## 2019-11-20 NOTE — TELEPHONE ENCOUNTER
Rolanda TURK Staff             Financial Call Center has not been able to contact patient.   Pt has a liability and/or residual balance due.              Is this procedure medically urgent or non-medically   Please respond via In-basket or contact [facility] @ [phone]                 Medical Urgency Definition     If you can Answer yes to one of the following questions, the   Case will be considered Medically Urgent and will be done as   Scheduled irrespective of whether [facility] will receive payment.     *If this particular case is not done as scheduled, would the patient   Experience loss of life?     *Loss of Limb?     *Irreversible loss of function?     *Would their condition significantly worsen?     If none of these questions have a yes answer, the case   Should be postponed until such a time as the finances   can be sorted out.

## 2019-11-21 ENCOUNTER — HOSPITAL ENCOUNTER (OUTPATIENT)
Dept: RADIOLOGY | Facility: HOSPITAL | Age: 39
Discharge: HOME | End: 2019-11-21
Attending: INTERNAL MEDICINE
Payer: COMMERCIAL

## 2019-11-21 DIAGNOSIS — M54.9 UPPER BACK PAIN: ICD-10-CM

## 2019-11-21 DIAGNOSIS — M54.6 THORACIC SPINE PAIN: ICD-10-CM

## 2019-11-21 DIAGNOSIS — G89.29 CHRONIC BILATERAL THORACIC BACK PAIN: ICD-10-CM

## 2019-11-21 DIAGNOSIS — M54.6 CHRONIC BILATERAL THORACIC BACK PAIN: ICD-10-CM

## 2019-11-21 PROCEDURE — 72146 MRI CHEST SPINE W/O DYE: CPT | Mod: TC

## 2019-11-21 PROCEDURE — 72146 MRI THORACIC SPINE WITHOUT CONTRAST: ICD-10-PCS | Mod: 26,,, | Performed by: RADIOLOGY

## 2019-11-21 PROCEDURE — 72146 MRI CHEST SPINE W/O DYE: CPT | Mod: 26,,, | Performed by: RADIOLOGY

## 2019-11-25 ENCOUNTER — TELEPHONE (OUTPATIENT)
Dept: PHARMACY | Facility: CLINIC | Age: 39
End: 2019-11-25

## 2019-11-25 ENCOUNTER — PATIENT MESSAGE (OUTPATIENT)
Dept: INTERNAL MEDICINE | Facility: CLINIC | Age: 39
End: 2019-11-25

## 2019-11-25 DIAGNOSIS — M54.6 CHRONIC BILATERAL THORACIC BACK PAIN: Primary | ICD-10-CM

## 2019-11-25 DIAGNOSIS — G89.29 CHRONIC BILATERAL THORACIC BACK PAIN: Primary | ICD-10-CM

## 2019-12-02 ENCOUNTER — OFFICE VISIT (OUTPATIENT)
Dept: NEUROLOGY | Facility: CLINIC | Age: 39
End: 2019-12-02
Payer: COMMERCIAL

## 2019-12-02 VITALS
HEART RATE: 91 BPM | HEIGHT: 59 IN | WEIGHT: 156.75 LBS | BODY MASS INDEX: 31.6 KG/M2 | DIASTOLIC BLOOD PRESSURE: 90 MMHG | SYSTOLIC BLOOD PRESSURE: 131 MMHG

## 2019-12-02 DIAGNOSIS — F41.9 ANXIETY: ICD-10-CM

## 2019-12-02 DIAGNOSIS — G43.009 MIGRAINE WITHOUT AURA AND WITHOUT STATUS MIGRAINOSUS, NOT INTRACTABLE: Primary | ICD-10-CM

## 2019-12-02 PROCEDURE — 99213 OFFICE O/P EST LOW 20 MIN: CPT | Mod: S$GLB,,, | Performed by: NURSE PRACTITIONER

## 2019-12-02 PROCEDURE — 99213 PR OFFICE/OUTPT VISIT, EST, LEVL III, 20-29 MIN: ICD-10-PCS | Mod: S$GLB,,, | Performed by: NURSE PRACTITIONER

## 2019-12-02 PROCEDURE — 99999 PR PBB SHADOW E&M-EST. PATIENT-LVL III: ICD-10-PCS | Mod: PBBFAC,,, | Performed by: NURSE PRACTITIONER

## 2019-12-02 PROCEDURE — 99999 PR PBB SHADOW E&M-EST. PATIENT-LVL III: CPT | Mod: PBBFAC,,, | Performed by: NURSE PRACTITIONER

## 2019-12-02 PROCEDURE — 3008F BODY MASS INDEX DOCD: CPT | Mod: CPTII,S$GLB,, | Performed by: NURSE PRACTITIONER

## 2019-12-02 PROCEDURE — 3008F PR BODY MASS INDEX (BMI) DOCUMENTED: ICD-10-PCS | Mod: CPTII,S$GLB,, | Performed by: NURSE PRACTITIONER

## 2019-12-02 RX ORDER — SUMATRIPTAN SUCCINATE 100 MG/1
TABLET ORAL
Qty: 27 TABLET | Refills: 3 | Status: SHIPPED | OUTPATIENT
Start: 2019-12-02 | End: 2019-12-02 | Stop reason: SDUPTHER

## 2019-12-02 RX ORDER — SERTRALINE HYDROCHLORIDE 100 MG/1
100 TABLET, FILM COATED ORAL DAILY
Qty: 90 TABLET | Refills: 3 | Status: SHIPPED | OUTPATIENT
Start: 2019-12-02 | End: 2021-08-06 | Stop reason: SDUPTHER

## 2019-12-02 RX ORDER — SUMATRIPTAN SUCCINATE 100 MG/1
TABLET ORAL
Qty: 27 TABLET | Refills: 3 | Status: SHIPPED | OUTPATIENT
Start: 2019-12-02 | End: 2021-02-12 | Stop reason: SDUPTHER

## 2019-12-02 NOTE — PROGRESS NOTES
"Established Patient   SUBJECTIVE:  Patient ID: Kelly Thomason   Chief Complaint: Follow-up    History of Present Illness:  Kelly Thomason is a 39 y.o. female who presents to clinic alone for follow-up of headaches.     Recommendations made at last Office Visit on 1/3/2019:  - D/C Botox   - Start Aimovig 140 mg SQ monthly injections  - Continue Zoloft 100 mg daily   - Continue tracking headaches   - Discussed goals of therapy are to decrease the frequency, intensity, and duration of headaches  - Anxiety - well controlled, per patient, on Zoloft 100 mg daily   - Obesity - encouraged her to begin exercising regularly and following a healthy diet   - RTC in 3-6 months or sooner if needed      12/02/2019 - Interval History:  Migraines are "great", has only been experiencing 1-2 migraines per month, most of which are resolved by excedrin, occasionally has to take sumatriptan.  She picked up a 3 month supply of sumatriptan last January and still has 10 pills left!  She has been using Aimovig 70 mg SQ monthly, which has been very effective. She does experiencing itching to the injection site, which is relieved by ice.  She is very happy with the current state of her migraines and does not wish to make any further adjustments to her treatment plan at this time.      Otherwise, information below is still accurate and current.     01/03/2019 - Interval History:  Headaches and migraines have begun to occur more frequently, despite Botox injections, she no longer feels Botox is helping her migraines and wants to discuss changing her migraine prevention.  She has tried multiple oral medications as well as Botox without significant relief.  She is interested in trying a CGRP inhibitor for migraine prevention as well as discontinuing Botox injections. Migraines continue to feel the same as they always have, she denies any change in the quality or nature of her migraines.       11/13/2018- Interval History:   presents to " "clinic for third round of Botox, after her second round of Botox she noticed a decrease in the frequency of her migraines, admits she has been under increased stress over the last few weeks and migraines have not been significantly more frequent, which they would have been prior to beginning Botox injections.  Prior to beginning Botox, she was suffering with 1-2 debilitating migraines per week, over the last twelve weeks she feels she has only had 1-2 debilitating migraines and "a couple" moderate to severe migraines.  Sumatriptan continues to give her good relief, on only one occasion sumatriptan has not been effective following the first dose.  She has questions regarding alternative treatment options including Aimovig, Frova and Cambia.  Discussed with regards to Aimovig, I would prefer to have her continue with Botox injections, as they have been quite effective in minimizing the burden of her migraines, for at least 3 rounds.  If after round 3 she would like to stop Botox and consider alternative options I would be agreeable to this plan.  I would not recommend her use Frova for migraine abortive as the onset of action is quite slow and her migraines intensify to peak fairly quickly.  Cambia could be used to supplement Sumatriptan in place of Naprosyn, however after learning Cambia is similar to Naprosyn, she feels more comfortable with continuing with use of Naprosyn.       08/27/2018- Interval History:  She does feel migraine frequency has decreased after first dose of Botox, also feels migraines are more responsive to medication.  She denies any presence of side effects from Botox injections and wishes to continue with treatment at this time.      06/04/2018- Interval History:  Zomig did not give her any relief, Sumatriptan gives her more relief.  Prednisone did help slightly to break up headache cycle.  She has only been taking Sumatriptan 1-2 times per week.  She continues to state she does not wish to add " daily medication to prevention.  Headaches have not changed in nature or quality.  Risks, Benefits, and potential side effects of Botox discussed with patient.  Alternative treatments offered.  After thorough discussed regarding the procedure, patient has decided to move forward with initiating Botox injections for Chronic Migraine.  Patient understands we will complete 3 rounds of injections, if after 3 rounds, we do not see a 50% reduction in headaches, we will discontinue Botox injections.      04/23/2018 - Interval History:  At last appointment with Dr. Holm in January 2017, it was suggested  take 1/2 tab Sumatriptan 100 mg with 1 1/2 tabs aleve for migraine abortive.  For a while her migraines had been great, states she only rarely expereinced any migraines during pregnancy as well as while she was breast feeding.  She stopped breast feeding last summer and migraines have since gradually become more frequent.  She reports having a headache nearly everyday with full blown migraines occurring 1-2 times per week, each lasting a full day.  She has tried using sumatriptan with aleve, however she does not find this effective.  She has suffered from migraines since she was 14 years old, she denies any change in the quality or nature of her headaches, this is how they have always been, just becoming more frequent.  Migraine is described as a pounding pain typically located behind her eye (can be either right or left sided) and temple, radiating into the neck.  Denies presence of aura symptoms.  Migraines are associated with nausea, photophobia, phonophobia.  Worsened with movement/activity.  Rates pain from 6-7 out of 10.  Additionally, she has noticed when she gets a migraine while on her menstrual cycle, the intensity is much worse, headache is harder to break, and duration is significantly longer, these headaches come the week before her menstrual cycle.  She is currently on Zoloft for prevention, but has  tried numerous preventive and abortive options in the past (listed below).    Additionally, she has secondary complaints of intermittent lateral double vision which only occurs when she is either driving or watching tv.  She has noticed it typically only occurs after she has been watching tv for a while or has been driving for a good bit.  She believes the double vision resolves after she gets out of her car or walks away from the television but is unsure.  She has seen her optometrist who did not find any ocular cause for these symptoms.  She feels this has been going on for over a year.  When she gets double vision, if she closes on of her eyes these symptoms will resolve.      Notes from Dr. Holm:  History: Patient presents for follow-up for her migraine headaches.  Insurance will no longer cover Treximet, so she would like to have sumatriptan prescription and be able to add Aleve to it.  She typically takes half tablet of the Treximet.  We will give her the sumatriptan 100 mg which she can take half a tablet of in 1-1-1/2 Aleve with it.  Patient had been pregnant with no headaches for a while.  She has had increased frequency in the last 6 months with 2-3 headaches per month with good response to the Treximet.  She had tried that Cephaly  device but complains she doesn't have time to 2 minutes with a 2-year-old.  Previous note: 10-3-13: This is a 33-year-old white female who presents for followup for her migraine headaches. Patient was previously seen by Dr. Nicolle Chatman who is no longer here. Patient describes a 20 year history of migraine headaches. She describes right or left temporal pain radiating to the hand eye causing aching/deep pain in the eye radiating into the neck. Headache intensity is 5-6/10 associated with nausea, photophobia, sonophobia and lasting all day. The patient has no aura. She generally takes a quarter tablet of Treximet with relief in 30-60 minutes. She takes Cymbalta 30 mg and  "nortriptyline 25 mg h.s. for migraine prevention. Overall she has been doing quite well with this medication regimen and is here predominantly to establish with a new neurologist.       Therapies Tried & Response:   Pamelor - unsure   Treximet - helped   Cefaly -   Sumatriptan -   Cymbalta - unsure  Zoloft -   Tramadol -   Excedrin -   Effexor - unsure   Topiramate - unsure, cannot try again hx of kidney stone  Zomig NS -   Zomig PO -   Lamictal   Elavil - weight gain   Magnesium -   relpax -   Paxil - no   Midrin - ?  Darvocet -   Nadolol  Prednisone -    Baclofen -   Zomig PO -   Botox -helping   Aimovig - effective     Current Medications:    erenumab-aooe (AIMOVIG AUTOINJECTOR) 70 mg/mL injection, Inject 1 mL (70 mg total) into the skin every 28 days., Disp: 2 mL, Rfl: 11    sertraline (ZOLOFT) 100 MG tablet, Take 1 tablet (100 mg total) by mouth once daily., Disp: 90 tablet, Rfl: 3    sumatriptan (IMITREX) 100 MG tablet, Take 1 tab at onset of migraine, can repeat in 2 hrs if needed. Max 2 tabs/day. Max 3 days/week., Disp: 27 tablet, Rfl: 3    Current Facility-Administered Medications:     etonogestrel Impl 68 mg, 68 mg, Subdermal, Once, Paola Tony NP    Review of Systems - as per HPI, otherwise a balanced 10 systems review is negativesdf.    OBJECTIVE:  Vitals:  BP (!) 131/90 (BP Location: Right arm, Patient Position: Sitting, BP Method: Large (Automatic))   Pulse 91   Ht 4' 11" (1.499 m)   Wt 71.1 kg (156 lb 12 oz)   BMI 31.66 kg/m²      Physical Exam:  Constitutional: she appears well-developed and well-nourished. she is well groomed. NAD.     Review of Data:   Notes from internal medicine reviewed   Labs:  No visits with results within 3 Month(s) from this visit.   Latest known visit with results is:   Lab Visit on 07/06/2019   Component Date Value Ref Range Status    Cholesterol 07/06/2019 138  120 - 199 mg/dL Final    Triglycerides 07/06/2019 137  30 - 150 mg/dL Final    HDL 07/06/2019 44 "  40 - 75 mg/dL Final    LDL Cholesterol 07/06/2019 66.6  63.0 - 159.0 mg/dL Final    Hdl/Cholesterol Ratio 07/06/2019 31.9  20.0 - 50.0 % Final    Total Cholesterol/HDL Ratio 07/06/2019 3.1  2.0 - 5.0 Final    Non-HDL Cholesterol 07/06/2019 94  mg/dL Final    Sodium 07/06/2019 140  136 - 145 mmol/L Final    Potassium 07/06/2019 4.6  3.5 - 5.1 mmol/L Final    Chloride 07/06/2019 108  95 - 110 mmol/L Final    CO2 07/06/2019 26  23 - 29 mmol/L Final    Glucose 07/06/2019 93  70 - 110 mg/dL Final    BUN, Bld 07/06/2019 17  6 - 20 mg/dL Final    Creatinine 07/06/2019 0.8  0.5 - 1.4 mg/dL Final    Calcium 07/06/2019 9.3  8.7 - 10.5 mg/dL Final    Anion Gap 07/06/2019 6* 8 - 16 mmol/L Final    eGFR if African American 07/06/2019 >60  >60 mL/min/1.73 m^2 Final    eGFR if non African American 07/06/2019 >60  >60 mL/min/1.73 m^2 Final     Imaging:  No results found for this or any previous visit.  Note: I have independently reviewed any/all imaging/labs/tests and agree with the report (s) as documented.  Any discrepancies will be as noted/demarcated by free text.  ARMAAN URIBE 12/2/2019    ASSESSMENT:  1. Migraine without aura and without status migrainosus, not intractable    2. Anxiety      PLAN:  - Continue Aimovig 70 mg SQ monthly injection  - Continue Zoloft 100 mg daily for migraine prevention and anxiety   - For migraine abortive - refilled sumatriptan 100 mg tabs   - Continue tracking headaches   - Anxiety - well controlled on zoloft 100 mg daily, management per PCP   - RTC in 1 year or sooner if needed     If migraines continue to be well controlled on current regimen at next appt, will return care back to PCP      Orders Placed This Encounter    sertraline (ZOLOFT) 100 MG tablet    sumatriptan (IMITREX) 100 MG tablet     Questions and concerns were sought and answered to the patient's stated verbal satisfaction.  The patient verbalizes understanding and agreement with the above stated treatment plan.      CC: MD Makayla Bhatt, [facility]  [facility]   [phone]    Dr. Perez was available during today's encounter.

## 2019-12-02 NOTE — LETTER
December 2, 2019      Christiano Hurst MD  [address]  [facility] [address]           [facility]  [address]  [facility] [address]  Phone: [phone]  Fax: [phone]          Patient: Kelly Thomason   MR Number: [id number]   YOB: 1980   Date of Visit: 12/2/2019       Dear Dr. Christiano Hurst:    Thank you for referring Kelly Thomason to me for evaluation. Attached you will find relevant portions of my assessment and plan of care.    If you have questions, please do not hesitate to call me. I look forward to following Kelly Thomason along with you.    Sincerely,    Makayla Aly, [facility]    Enclosure  CC:  No Recipients    If you would like to receive this communication electronically, please contact [email or web address] or [phone] to request more information on [employer] access.    For providers and/or their staff who would like to refer a patient to [facility], please contact us through our one-stop-shop provider referral line, [facility], at [phone].    If you feel you have received this communication in error or would no longer like to receive these types of communications, please e-mail [email or web address]

## 2019-12-10 ENCOUNTER — HOSPITAL ENCOUNTER (OUTPATIENT)
Dept: RADIOLOGY | Facility: HOSPITAL | Age: 39
Discharge: HOME | End: 2019-12-10
Attending: PHYSICAL MEDICINE & REHABILITATION
Payer: COMMERCIAL

## 2019-12-10 ENCOUNTER — OFFICE VISIT (OUTPATIENT)
Dept: PAIN MEDICINE | Facility: CLINIC | Age: 39
End: 2019-12-10
Payer: COMMERCIAL

## 2019-12-10 VITALS
DIASTOLIC BLOOD PRESSURE: 83 MMHG | WEIGHT: 154.63 LBS | HEART RATE: 88 BPM | BODY MASS INDEX: 31.24 KG/M2 | SYSTOLIC BLOOD PRESSURE: 149 MMHG

## 2019-12-10 DIAGNOSIS — R29.2 HYPERREFLEXIA: ICD-10-CM

## 2019-12-10 DIAGNOSIS — M79.18 MYOFASCIAL PAIN: ICD-10-CM

## 2019-12-10 DIAGNOSIS — G47.01 INSOMNIA SECONDARY TO CHRONIC PAIN: ICD-10-CM

## 2019-12-10 DIAGNOSIS — G89.29 INSOMNIA SECONDARY TO CHRONIC PAIN: ICD-10-CM

## 2019-12-10 DIAGNOSIS — R29.2: Primary | ICD-10-CM

## 2019-12-10 DIAGNOSIS — M54.2 NECK PAIN: ICD-10-CM

## 2019-12-10 DIAGNOSIS — R29.2: ICD-10-CM

## 2019-12-10 DIAGNOSIS — R29.3 POOR POSTURE: ICD-10-CM

## 2019-12-10 PROCEDURE — 99204 OFFICE O/P NEW MOD 45 MIN: CPT | Mod: S$GLB,,, | Performed by: PHYSICAL MEDICINE & REHABILITATION

## 2019-12-10 PROCEDURE — 99204 PR OFFICE/OUTPT VISIT, NEW, LEVL IV, 45-59 MIN: ICD-10-PCS | Mod: S$GLB,,, | Performed by: PHYSICAL MEDICINE & REHABILITATION

## 2019-12-10 PROCEDURE — 99999 PR PBB SHADOW E&M-EST. PATIENT-LVL IV: CPT | Mod: PBBFAC,,, | Performed by: PHYSICAL MEDICINE & REHABILITATION

## 2019-12-10 PROCEDURE — 72052 XR CERVICAL SPINE 5 VIEW WITH FLEX AND EXT: ICD-10-PCS | Mod: 26,,, | Performed by: RADIOLOGY

## 2019-12-10 PROCEDURE — 99999 PR PBB SHADOW E&M-EST. PATIENT-LVL IV: ICD-10-PCS | Mod: PBBFAC,,, | Performed by: PHYSICAL MEDICINE & REHABILITATION

## 2019-12-10 PROCEDURE — 72052 X-RAY EXAM NECK SPINE 6/>VWS: CPT | Mod: TC,FY

## 2019-12-10 PROCEDURE — 72052 X-RAY EXAM NECK SPINE 6/>VWS: CPT | Mod: 26,,, | Performed by: RADIOLOGY

## 2019-12-10 RX ORDER — TIZANIDINE 4 MG/1
4 TABLET ORAL NIGHTLY PRN
Qty: 30 TABLET | Refills: 2 | Status: SHIPPED | OUTPATIENT
Start: 2019-12-10 | End: 2020-03-09

## 2019-12-10 NOTE — LETTER
December 10, 2019      Christiano Hurst MD  [address]  [facility] [address]           [facility]  [address] [place] [address] [address]  [facility] [address]  Phone: [phone]          Patient: Kelly Thomason   MR Number: [id number]   YOB: 1980   Date of Visit: 12/10/2019       Dear Dr. Christiano Hurst:    Thank you for referring Kelly Thomason to me for evaluation. Attached you will find relevant portions of my assessment and plan of care.    If you have questions, please do not hesitate to call me. I look forward to following Kelly Thomason along with you.    Sincerely,    Kelly Jean MD    Enclosure  CC:  No Recipients    If you would like to receive this communication electronically, please contact [email or web address] or [phone] to request more information on [employer] Link access.    For providers and/or their staff who would like to refer a patient to [facility], please contact us through our one-stop-shop provider referral line, [facility] , at [phone].    If you feel you have received this communication in error or would no longer like to receive these types of communications, please e-mail [email or web address]

## 2019-12-23 ENCOUNTER — TELEPHONE (OUTPATIENT)
Dept: PHARMACY | Facility: CLINIC | Age: 39
End: 2019-12-23

## 2019-12-30 ENCOUNTER — HOSPITAL ENCOUNTER (OUTPATIENT)
Dept: RADIOLOGY | Facility: HOSPITAL | Age: 39
Discharge: HOME | End: 2019-12-30
Attending: PHYSICAL MEDICINE & REHABILITATION
Payer: COMMERCIAL

## 2019-12-30 DIAGNOSIS — M54.2 NECK PAIN: ICD-10-CM

## 2019-12-30 DIAGNOSIS — R29.2: ICD-10-CM

## 2019-12-30 DIAGNOSIS — R29.2 HYPERREFLEXIA: ICD-10-CM

## 2019-12-30 PROCEDURE — 72141 MRI CERVICAL SPINE WITHOUT CONTRAST: ICD-10-PCS | Mod: 26,,, | Performed by: RADIOLOGY

## 2019-12-30 PROCEDURE — 72141 MRI NECK SPINE W/O DYE: CPT | Mod: 26,,, | Performed by: RADIOLOGY

## 2019-12-30 PROCEDURE — 72141 MRI NECK SPINE W/O DYE: CPT | Mod: TC

## 2020-01-09 ENCOUNTER — CLINICAL SUPPORT (OUTPATIENT)
Dept: REHABILITATION | Facility: HOSPITAL | Age: 40
End: 2020-01-09
Attending: PHYSICAL MEDICINE & REHABILITATION
Payer: COMMERCIAL

## 2020-01-09 DIAGNOSIS — M54.2 NECK PAIN: ICD-10-CM

## 2020-01-09 DIAGNOSIS — R29.3 POOR POSTURE: ICD-10-CM

## 2020-01-09 DIAGNOSIS — M53.82 DECREASED ROM OF INTERVERTEBRAL DISCS OF CERVICAL SPINE: ICD-10-CM

## 2020-01-09 PROCEDURE — 97161 PT EVAL LOW COMPLEX 20 MIN: CPT

## 2020-01-09 PROCEDURE — 97110 THERAPEUTIC EXERCISES: CPT

## 2020-01-09 NOTE — PLAN OF CARE
[facility]  Physical Therapy Initial Evaluation    Name: Kelly Thomason  Clinic Number: [id number]    Therapy Diagnosis:   Encounter Diagnoses   Name Primary?    Neck pain     Poor posture     Decreased ROM of intervertebral discs of cervical spine      Physician: Kelly Jean MD    Physician Orders: PT Eval and Treat   Medical Diagnosis from Referral:   M54.2 (ICD-10-CM) - Neck pain   M79.18 (ICD-10-CM) - Myofascial pain   R29.3 (ICD-10-CM) - Poor posture       Evaluation Date: 1/9/2020  Authorization Period Expiration: TBD  Plan of Care Expiration: 3/20/2020  Visit # / Visits authorized: 1/ TBD    Time In: 310  Time Out: 350  Total Billable Time: 15 minutes      Precautions: Standard    Subjective   Date of onset: 6 months  History of current condition -  reports: having neck and shoulder pain about 6 months ago that progressively has worsened. Patient does not recall a mechanism of injury for her pain. Patient reports her pain is at its worst when she goes to sleep. Patient reports she can lay on her back without pain, but when she lays on her side it hurts the worst. Patient reports her pain is centered around her lower cervical spine and sometimes in the shoulder blades. Patient received an MRI that did not show any significant findings. Pt reports she does not have any radiating symptoms down her arm.     Trauma- No  Bowel/Bladder Disturbances- No  History of Cancer- No  Constant Pain- No  Night Pain- No  Headaches- No  Drop Attacks/Dizzy/Diplopia/Dysphagia/Dysarthria- No       Medical History:   Past Medical History:   Diagnosis Date    Abnormal Pap smear of cervix     Depression     Head ache     Hives     Kidney stones     Urticaria        Surgical History:   Kelly Thomason  has a past surgical history that includes Haskell tooth extraction and Kidney stone surgery.    Medications:    has a current medication list which includes the following prescription(s):  erenumab-aooe, sertraline, sumatriptan, tizanidine, and alprazolam, and the following Facility-Administered Medications: etonogestrel.    Allergies:   Review of patient's allergies indicates:   Allergen Reactions    Percocet [oxycodone-acetaminophen]         Imaging, See imaging section:     Prior Therapy: No  Social History:  lives with their family  Occupation: . [employer] reports she sits a lot   Prior Level of Function: Independent   Current Level of Function: Independent     Pain:  Current 0/10, worst 8/10, best 0/10   Location: bilateral back  and neck   Description: Aching  Aggravating Factors: Laying and Flexing  Easing Factors: None    Pts goals: To reduce pain     Objective     Cervical AROM: Pain/Dysfunction with Movement:   Flexion: 50 degrees Increased pain   Extension: 65 degrees    Right side bendin degrees    Left side bendin degrees Pain in L upper trap   Right rotation: 70 degrees    Left rotation: 60 degrees Pain in L upper trap     Myotome Right Left Pain/Dysfunction with Movement   C4- Shoulder Elevation 5/5 5/5    C5- Shoulder Abduction 4/5 4/5    C6- Wrist Extension 4+/5 4+/5    C7- Elbow Extension 5/5 5/5    C8- Phalangeal Abduction 5/5 5/5    T1- 5th Finger Abduction 5/5 5/5      Posture Assessment: Forward head rounded posture. Upper thoracic hyperkyphosis     Vertebral A Test: Negative     Sharp Frederic Test: Negative     Spurling's Test: Positive with extension     Distraction Test: Negative           CMS Impairment/Limitation/Restriction for FOTO Neck Survey    Therapist reviewed FOTO scores for Kelly Thomason on 2020.   FOTO documents entered into [employer] - see Media section.    Limitation Score: 35%  Category: Mobility           TREATMENT   Treatment Time In: 335  Treatment Time Out: 350  Total Treatment time separate from Evaluation: 15 minutes     received therapeutic exercises to develop strength, endurance, ROM, flexibility, posture and core stabilization for  10 minutes including:    Plan to initiate thoracic mobility exercises next visit    Mid Thoracic Stretch  UT Stretch  LV Stretch     received the following manual therapy techniques: Joint mobilizations and Myofacial release were applied to the: cervical and thoracic region for 5 minutes, including:  Myofascial Release to L rhomboids      Home Exercises and Patient Education Provided    Education provided:   - Purpose of PT    Written Home Exercises Provided: yes.  Exercises were reviewed and  was able to demonstrate them prior to the end of the session.  eKlly demonstrated good  understanding of the education provided.       Assessment    is a 39 y.o. female referred to outpatient Physical Therapy with a medical diagnosis of neck pain and poor posture. Pt presents with increased neck/shoulder pain with cervical range of motion and cervical special testing. Patient was educated on the importance of communicating to the therapist if any exercises cause worsening of pain. Patient was educated to stop any exercise that increases patients pain. Patient did not complain of worsening symptoms after therapy session.     Pt prognosis is Good.   Pt will benefit from skilled outpatient Physical Therapy to address the deficits stated above and in the chart below, provide pt/family education, and to maximize pt's level of independence.     Plan of care discussed with patient: Yes  Pt's spiritual, cultural and educational needs considered and patient is agreeable to the plan of care and goals as stated below:     Anticipated Barriers for therapy: None    Medical Necessity is demonstrated by the following  History  Co-morbidities and personal factors that may impact the plan of care Co-morbidities:   depression    Personal Factors:   no deficits     moderate   Examination  Body Structures and Functions, activity limitations and participation restrictions that may impact the plan of care Body Regions:   neck    Body  Systems:    ROM  strength    Participation Restrictions:   None    Activity limitations:   Learning and applying knowledge  no deficits    General Tasks and Commands  no deficits    Communication  no deficits    Mobility  no deficits    Self care  no deficits    Domestic Life  no deficits    Interactions/Relationships  no deficits    Life Areas  no deficits    Community and Social Life  no deficits         moderate   Clinical Presentation stable and uncomplicated low   Decision Making/ Complexity Score: low     Goals:  Short Term Goals (3 Weeks):   1. Pt will be independent with HEP to supplement PT in improving pain free cervical mobility  2. Pt will improve UE MMTs by 1/2 grade in all planes to improve strength for lifting and carrying tasks.  3. Pt will demonstrate improved sitting posture to decrease pain experienced in head and neck.  Long Term Goals (6 Weeks):   1. Pt will improve FOTO to </=25% limitation to improve perceived limitation with changing and maintaining mobility.  2. Pt will improve cervical AROM to WNL in all planes to improve cervical mobility for driving   3. Pt will improve UE MMTs 1 grade in all planes to improve strength for lifting and carrying tasks.  4. Pt will report no pain with cervical AROM in all planes to promote QOL.      Plan   Plan of care Certification: 1/9/2020 to 3/20/2020.    Outpatient Physical Therapy 2 times weekly for 10 weeks to include the following interventions: Cervical/Lumbar Traction, Manual Therapy, Moist Heat/ Ice, Neuromuscular Re-ed, Patient Education, Self Care, Therapeutic Activites and Therapeutic Exercise.     Robert Cadena, PT

## 2020-01-23 ENCOUNTER — CLINICAL SUPPORT (OUTPATIENT)
Dept: REHABILITATION | Facility: HOSPITAL | Age: 40
End: 2020-01-23
Attending: PHYSICAL MEDICINE & REHABILITATION
Payer: COMMERCIAL

## 2020-01-23 DIAGNOSIS — M53.82 DECREASED ROM OF INTERVERTEBRAL DISCS OF CERVICAL SPINE: ICD-10-CM

## 2020-01-23 DIAGNOSIS — M54.2 NECK PAIN: ICD-10-CM

## 2020-01-23 DIAGNOSIS — R29.3 POOR POSTURE: ICD-10-CM

## 2020-01-23 PROCEDURE — 97110 THERAPEUTIC EXERCISES: CPT

## 2020-01-23 PROCEDURE — 97140 MANUAL THERAPY 1/> REGIONS: CPT

## 2020-01-23 NOTE — PROGRESS NOTES
Physical Therapy Daily Treatment Note     Name: Kelly Chavirageo  Clinic Number: [id number]    Therapy Diagnosis:   Encounter Diagnoses   Name Primary?    Neck pain     Poor posture     Decreased ROM of intervertebral discs of cervical spine      Physician: Kelly Jean MD    Visit Date: 1/23/2020    Physician Orders: PT Eval and Treat   Medical Diagnosis from Referral:   M54.2 (ICD-10-CM) - Neck pain   M79.18 (ICD-10-CM) - Myofascial pain   R29.3 (ICD-10-CM) - Poor posture         Evaluation Date: 1/9/2020  Authorization Period Expiration: TBD  Plan of Care Expiration: 3/20/2020  Visit # / Visits authorized: 2/ TBD     Time In: 1205  Time Out: 1255  Total Billable Time: 50 minutes        Precautions: Standard      Subjective     Pt reports: She feels the same compared to her initial evaluation.  She was compliant with home exercise program.  Response to previous treatment: No adverse effects  Functional change: None    Pain: 6/10  Location: bilateral thoracic spine      Objective      received therapeutic exercises to develop strength, endurance, ROM, flexibility, posture and core stabilization for 40 minutes including:    Plan to initiate thoracic mobility exercises next visit     Mid Thoracic Stretch 10 sec x 10    UT Stretch 30 sec x 3  LV Stretch 30 sec x 3  Half Roll Swimmers/Bear Hugs/Touchdowns/Punches 15x  Half Kneeling Thoracic Rotations 15x      received the following manual therapy techniques: Joint mobilizations and manipulation were applied to the: upper thoracic/lower cervical spine for 10 minutes, including:    Myofascial Release to L rhomboids - not performed   Thoracic Manipulation on Foam Roller   Lower Cervical and Upper Thoracic PA mobs grade II-III   Home Exercises Provided and Patient Education Provided     Education provided:   - Purpose of PT   - HEP    Written Home Exercises Provided: Patient instructed to cont prior HEP.  Exercises were reviewed and  was able to  demonstrate them prior to the end of the session.   demonstrated good  understanding of the education provided.     See EMR under Patient Instructions for exercises provided prior visit.    Assessment     Patient tolerated thoracic and cervical mobility exercises mobility without adverse effects. Patient reported slight decreased pain after therapy session. Patient did not have any adverse effects from manual therapy or therx.    is progressing well towards her goals.   Pt prognosis is Good.     Pt will continue to benefit from skilled outpatient physical therapy to address the deficits listed in the problem list box on initial evaluation, provide pt/family education and to maximize pt's level of independence in the home and community environment.     Pt's spiritual, cultural and educational needs considered and pt agreeable to plan of care and goals.     Anticipated barriers to physical therapy: None    Goals:   Short Term Goals (3 Weeks):   1. Pt will be independent with HEP to supplement PT in improving pain free cervical mobility  2. Pt will improve UE MMTs by 1/2 grade in all planes to improve strength for lifting and carrying tasks.  3. Pt will demonstrate improved sitting posture to decrease pain experienced in head and neck.  Long Term Goals (6 Weeks):   1. Pt will improve FOTO to </=25% limitation to improve perceived limitation with changing and maintaining mobility.  2. Pt will improve cervical AROM to WNL in all planes to improve cervical mobility for driving   3. Pt will improve UE MMTs 1 grade in all planes to improve strength for lifting and carrying tasks.  4. Pt will report no pain with cervical AROM in all planes to promote QOL.    Plan     Outpatient Physical Therapy 2 times weekly for 10 weeks to include the following interventions: Cervical/Lumbar Traction, Manual Therapy, Moist Heat/ Ice, Neuromuscular Re-ed, Patient Education, Self Care, Therapeutic Activites and Therapeutic  Exercise.        Robert Cadena, PT

## 2020-01-27 ENCOUNTER — CLINICAL SUPPORT (OUTPATIENT)
Dept: REHABILITATION | Facility: HOSPITAL | Age: 40
End: 2020-01-27
Attending: PHYSICAL MEDICINE & REHABILITATION
Payer: COMMERCIAL

## 2020-01-27 DIAGNOSIS — M54.2 NECK PAIN: ICD-10-CM

## 2020-01-27 DIAGNOSIS — R29.3 POOR POSTURE: ICD-10-CM

## 2020-01-27 DIAGNOSIS — M53.82 DECREASED ROM OF INTERVERTEBRAL DISCS OF CERVICAL SPINE: ICD-10-CM

## 2020-01-27 PROCEDURE — 97110 THERAPEUTIC EXERCISES: CPT

## 2020-01-27 PROCEDURE — 97140 MANUAL THERAPY 1/> REGIONS: CPT

## 2020-01-27 NOTE — PROGRESS NOTES
Physical Therapy Daily Treatment Note     Name: Kelly Thomason  Clinic Number: [id number]    Therapy Diagnosis:   Encounter Diagnoses   Name Primary?    Neck pain     Poor posture     Decreased ROM of intervertebral discs of cervical spine      Physician: Kelly Jean MD    Visit Date: 1/27/2020    Physician Orders: PT Eval and Treat   Medical Diagnosis from Referral:   M54.2 (ICD-10-CM) - Neck pain   M79.18 (ICD-10-CM) - Myofascial pain   R29.3 (ICD-10-CM) - Poor posture         Evaluation Date: 1/9/2020  Authorization Period Expiration: TBD  Plan of Care Expiration: 3/20/2020  Visit # / Visits authorized: 3/ TBD     Time In: 200  Time Out: 305  Total Billable Time: 45 minutes        Precautions: Standard      Subjective     Pt reports: She feels the same, just a little more tightness between the shoulder blades, more tender on the left side.  She was compliant with home exercise program.  Response to previous treatment: No adverse effects  Functional change: None    Pain: 6/10  Location: bilateral thoracic spine      Objective      received therapeutic exercises to develop strength, endurance, ROM, flexibility, posture and core stabilization for 40 minutes including:    Plan to initiate thoracic mobility exercises next visit     Mid Thoracic Stretch 10 sec x 10    UT Stretch 30 sec x 3  LV Stretch 30 sec x 3  Half Roll Swimmers/Bear Hugs/Touchdowns/Punches 15x  Half Kneeling Thoracic Rotations 15x Not performed  Thoracic rotation in quoc pose 2x 10 ea direction  Reach/roll/lift 2x10 ea UE  Wall angels 2x10     received the following manual therapy techniques: Joint mobilizations and manipulation were applied to the: upper thoracic/lower cervical spine for 10 minutes, including:    Myofascial Release to L rhomboids - not performed   Thoracic Manipulation on Foam Roller   Lower Cervical and Upper Thoracic PA mobs grade II-III   Thoracic rotation stretch 2x 30 sec ea direction    Home Exercises  Provided and Patient Education Provided     Education provided:   - Purpose of PT   - HEP  - Discussed Us eFDN      Written Home Exercises Provided: Patient instructed to cont prior HEP.  Exercises were reviewed and  was able to demonstrate them prior to the end of the session.   demonstrated good  understanding of the education provided.     See EMR under Patient Instructions for exercises provided prior visit.    Assessment     Patient tolerated thoracic and cervical mobility exercises mobility without adverse effects. Patient reported slight decreased pain after therapy session, cont to show wkness in post thoracic musculature, tightness to rotation in thoracic spine.  Patient did not have any adverse effects from manual therapy or therx.    is progressing well towards her goals.   Pt prognosis is Good.     Pt will continue to benefit from skilled outpatient physical therapy to address the deficits listed in the problem list box on initial evaluation, provide pt/family education and to maximize pt's level of independence in the home and community environment.     Pt's spiritual, cultural and educational needs considered and pt agreeable to plan of care and goals.     Anticipated barriers to physical therapy: None    Goals:   Short Term Goals (3 Weeks):   1. Pt will be independent with HEP to supplement PT in improving pain free cervical mobility  2. Pt will improve UE MMTs by 1/2 grade in all planes to improve strength for lifting and carrying tasks.  3. Pt will demonstrate improved sitting posture to decrease pain experienced in head and neck.  Long Term Goals (6 Weeks):   1. Pt will improve FOTO to </=25% limitation to improve perceived limitation with changing and maintaining mobility.  2. Pt will improve cervical AROM to WNL in all planes to improve cervical mobility for driving   3. Pt will improve UE MMTs 1 grade in all planes to improve strength for lifting and carrying tasks.  4. Pt will  report no pain with cervical AROM in all planes to promote QOL.    Plan     Outpatient Physical Therapy 2 times weekly for 10 weeks to include the following interventions: Cervical/Lumbar Traction, Manual Therapy, Moist Heat/ Ice, Neuromuscular Re-ed, Patient Education, Self Care, Therapeutic Activites and Therapeutic Exercise.        Charbel Morris, PT

## 2020-01-28 ENCOUNTER — TELEPHONE (OUTPATIENT)
Dept: PHARMACY | Facility: CLINIC | Age: 40
End: 2020-01-28

## 2020-01-28 DIAGNOSIS — G43.709 CHRONIC MIGRAINE WITHOUT AURA WITHOUT STATUS MIGRAINOSUS, NOT INTRACTABLE: ICD-10-CM

## 2020-01-29 NOTE — TELEPHONE ENCOUNTER
RX call attempt 1 regarding refill on Aimovig from [facility]. Patient reached-- shipping out  for  arrival per patients request. Copay of 0.00 @ 004. Patient stated she was not in need of any supplies with this refill shipment. Address and  confirmed. Patient has 0 doses on hand at this time. Patient has not started any new medications, has had no missed doses and no side effects present. Patient is currently taking the medication as directed by doctors instruction, Inject 1 mL (70 mg total) into the skin every 28 days. Patient does have a safe place in their residence to keep medication at desired temperature away from small children and pets. Patient also does have the capability of contacting 911 in the event of an emergency. Patient states they do not have any questions or concerns at this time. Patients next injection is scheduled for Friday, .

## 2020-01-31 ENCOUNTER — CLINICAL SUPPORT (OUTPATIENT)
Dept: REHABILITATION | Facility: HOSPITAL | Age: 40
End: 2020-01-31
Attending: PHYSICAL MEDICINE & REHABILITATION
Payer: COMMERCIAL

## 2020-01-31 DIAGNOSIS — M54.2 NECK PAIN: ICD-10-CM

## 2020-01-31 DIAGNOSIS — M53.82 DECREASED ROM OF INTERVERTEBRAL DISCS OF CERVICAL SPINE: ICD-10-CM

## 2020-01-31 DIAGNOSIS — R29.3 POOR POSTURE: ICD-10-CM

## 2020-01-31 PROCEDURE — 97140 MANUAL THERAPY 1/> REGIONS: CPT

## 2020-01-31 PROCEDURE — 97110 THERAPEUTIC EXERCISES: CPT

## 2020-01-31 NOTE — PROGRESS NOTES
Physical Therapy Daily Treatment Note     Name: Kelly Chavirageo  Clinic Number: [id number]    Therapy Diagnosis:   Encounter Diagnoses   Name Primary?    Neck pain     Poor posture     Decreased ROM of intervertebral discs of cervical spine      Physician: Kelly Jean MD    Visit Date: 1/31/2020    Physician Orders: PT Eval and Treat   Medical Diagnosis from Referral:   M54.2 (ICD-10-CM) - Neck pain   M79.18 (ICD-10-CM) - Myofascial pain   R29.3 (ICD-10-CM) - Poor posture         Evaluation Date: 1/9/2020  Authorization Period Expiration: TBD  Plan of Care Expiration: 3/20/2020  Visit # / Visits authorized: 3/ TBD     Time In: 8a  Time Out: 855a  Total Billable Time: 45 minutes        Precautions: Standard      Subjective     Pt reports: She feels much better, last night was the first night she was able to sleep without tossing all over the place.  Feels the thoracic rotation exercises really help.  Pain at 1/10  She was compliant with home exercise program.  Response to previous treatment: No adverse effects  Functional change: None    Pain: 1/10  Location: bilateral thoracic spine      Objective      received therapeutic exercises to develop strength, endurance, ROM, flexibility, posture and core stabilization for 30 minutes including:    Plan to initiate thoracic mobility exercises next visit     Mid Thoracic Stretch 10 sec x 10    UT Stretch 30 sec x 3  LV Stretch 30 sec x 3  Half Roll Swimmers/Bear Hugs/Touchdowns/Punches 15x  Half Kneeling Thoracic Rotations 2x10  Thoracic rotation in quoc pose 2x 10 ea direction  Reach/roll/lift 2x10 ea UE  Wall angels 2x10  Prone on ball rhomboid/mid trap 2x 10 ea  SA punch with #5 wt 3x12 on 1/2 bolster     received the following manual therapy techniques: Joint mobilizations and manipulation were applied to the: upper thoracic/lower cervical spine for 10 minutes, including:    Myofascial Release to L rhomboids - not performed   Thoracic Manipulation on  Foam Roller   Lower Cervical and Upper Thoracic PA mobs grade II-III   Thoracic rotation stretch 2x 30 sec ea direction    Home Exercises Provided and Patient Education Provided     Education provided:   - Purpose of PT   - HEP  - Discussed Us eFDN      Written Home Exercises Provided: Patient instructed to cont prior HEP.  Exercises were reviewed and  was able to demonstrate them prior to the end of the session.   demonstrated good  understanding of the education provided.     See EMR under Patient Instructions for exercises provided prior visit.    Assessment     Patient tolerated thoracic and cervical mobility exercises mobility without adverse effects. Patient reported slight decreased pain after therapy session, cont to show wkness in post thoracic musculature, tightness to rotation in thoracic spine.  Patient did not have any adverse effects from manual therapy or therx.    is progressing well towards her goals.   Pt prognosis is Good.     Pt will continue to benefit from skilled outpatient physical therapy to address the deficits listed in the problem list box on initial evaluation, provide pt/family education and to maximize pt's level of independence in the home and community environment.     Pt's spiritual, cultural and educational needs considered and pt agreeable to plan of care and goals.     Anticipated barriers to physical therapy: None    Goals:   Short Term Goals (3 Weeks):   1. Pt will be independent with HEP to supplement PT in improving pain free cervical mobility  2. Pt will improve UE MMTs by 1/2 grade in all planes to improve strength for lifting and carrying tasks.  3. Pt will demonstrate improved sitting posture to decrease pain experienced in head and neck.  Long Term Goals (6 Weeks):   1. Pt will improve FOTO to </=25% limitation to improve perceived limitation with changing and maintaining mobility.  2. Pt will improve cervical AROM to WNL in all planes to improve cervical  mobility for driving   3. Pt will improve UE MMTs 1 grade in all planes to improve strength for lifting and carrying tasks.  4. Pt will report no pain with cervical AROM in all planes to promote QOL.    Plan     Outpatient Physical Therapy 2 times weekly for 10 weeks to include the following interventions: Cervical/Lumbar Traction, Manual Therapy, Moist Heat/ Ice, Neuromuscular Re-ed, Patient Education, Self Care, Therapeutic Activites and Therapeutic Exercise.        Charbel Morris, PT

## 2020-02-21 ENCOUNTER — TELEPHONE (OUTPATIENT)
Dept: PHARMACY | Facility: CLINIC | Age: 40
End: 2020-02-21

## 2020-03-05 ENCOUNTER — PATIENT MESSAGE (OUTPATIENT)
Dept: OBSTETRICS AND GYNECOLOGY | Facility: CLINIC | Age: 40
End: 2020-03-05

## 2020-03-24 ENCOUNTER — TELEPHONE (OUTPATIENT)
Dept: PHARMACY | Facility: CLINIC | Age: 40
End: 2020-03-24

## 2020-03-24 ENCOUNTER — TELEPHONE (OUTPATIENT)
Dept: OBSTETRICS AND GYNECOLOGY | Facility: CLINIC | Age: 40
End: 2020-03-24

## 2020-03-24 NOTE — TELEPHONE ENCOUNTER
DOCUMENTATION ONLY:  Prior RE - authorization for Aimovig 70 mg/mL #1/28 approved from 03/24/2020 to 03/24/2021  Case ID: [id number]    Co-pay: $5.00    Patient Assistance IS required and is on file. Sending to clinical pharmacist for  and shipment. Ronen MORA

## 2020-03-24 NOTE — TELEPHONE ENCOUNTER
Called pt to reschedule her appointment. Pt would like to see about getting her nexplanon changed. Pt verblized understanding.

## 2020-03-27 ENCOUNTER — TELEPHONE (OUTPATIENT)
Dept: PHARMACY | Facility: CLINIC | Age: 40
End: 2020-03-27

## 2020-04-27 ENCOUNTER — TELEPHONE (OUTPATIENT)
Dept: PHARMACY | Facility: CLINIC | Age: 40
End: 2020-04-27

## 2020-05-12 ENCOUNTER — TELEPHONE (OUTPATIENT)
Dept: OBSTETRICS AND GYNECOLOGY | Facility: CLINIC | Age: 40
End: 2020-05-12

## 2020-05-15 ENCOUNTER — PATIENT MESSAGE (OUTPATIENT)
Dept: OBSTETRICS AND GYNECOLOGY | Facility: CLINIC | Age: 40
End: 2020-05-15

## 2020-05-21 ENCOUNTER — PROCEDURE VISIT (OUTPATIENT)
Dept: OBSTETRICS AND GYNECOLOGY | Facility: CLINIC | Age: 40
End: 2020-05-21
Payer: COMMERCIAL

## 2020-05-21 VITALS
SYSTOLIC BLOOD PRESSURE: 112 MMHG | WEIGHT: 159.38 LBS | DIASTOLIC BLOOD PRESSURE: 84 MMHG | HEIGHT: 59 IN | BODY MASS INDEX: 32.13 KG/M2

## 2020-05-21 DIAGNOSIS — Z30.46 NEXPLANON REMOVAL: Primary | ICD-10-CM

## 2020-05-21 DIAGNOSIS — Z30.017 NEXPLANON INSERTION: ICD-10-CM

## 2020-05-21 PROBLEM — Z97.5 NEXPLANON IN PLACE: Status: RESOLVED | Noted: 2018-05-16 | Resolved: 2020-05-21

## 2020-05-21 PROCEDURE — 11983 REMOVE/INSERT DRUG IMPLANT: CPT | Mod: S$GLB,,, | Performed by: NURSE PRACTITIONER

## 2020-05-21 PROCEDURE — 11983 PR REMOVAL W/ REINSERT DRUG IMPLANT DEVICE: ICD-10-PCS | Mod: S$GLB,,, | Performed by: NURSE PRACTITIONER

## 2020-05-21 NOTE — PROCEDURES
Insertion of Nexplanon  Date/Time: 2020 10:40 AM  Performed by: Paola Tony NP  Authorized by: Paola Tony NP     Consent obtained:  Written  Consent given by:  Patient  Patient questions answered: yes    Patient agrees, verbalizes understanding, and wants to proceed: yes    Instructions and paperwork completed: yes    Pre-procedure timeout performed: yes    Prepped with: alcohol 70%    Local anesthetic:  Lidocaine without epinephrine  The site was cleaned and prepped in a sterile fashion: yes    Small stab incision was made in arm: yes    Left/right:  Left   68 mg etonogestrel 68 mg  Preloaded Implanon trocar was placed subdermally: yes    Visualization of implant was obtained: yes    Nexplanon was inserted and trocar removed: yes    Visualization of notch in stilette and palpitation of device: yes    Palpitation confirms placement by provider and patient: yes    Site was closed with steri-strips and pressure bandage applied: yes         CC: Nexplanon removal and reinsertion    Pt presents today for a nexplanon removal and reinsertion. Current implant has .     Nexplanon palpated through the skin.  Area wiped with rubbing alcohol.  3 cc of 1% lidocaine without epinephrine was injected in the subcutaneous tissue.  The area was prepped with betadine.  A stabbing incision was made with an 11 blade scalpel.  The Implanon was identified and grasped with curved hemostat.  It was removed intact.  A bandage was placed over the incision site. Pt tolerated the procedure well.     Due for repeat pap/HPV testing, pt will make appointment on her own.

## 2020-05-22 ENCOUNTER — TELEPHONE (OUTPATIENT)
Dept: PHARMACY | Facility: CLINIC | Age: 40
End: 2020-05-22

## 2020-05-26 ENCOUNTER — TELEPHONE (OUTPATIENT)
Dept: OBSTETRICS AND GYNECOLOGY | Facility: CLINIC | Age: 40
End: 2020-05-26

## 2020-06-18 ENCOUNTER — TELEPHONE (OUTPATIENT)
Dept: PHARMACY | Facility: CLINIC | Age: 40
End: 2020-06-18

## 2020-07-20 ENCOUNTER — TELEPHONE (OUTPATIENT)
Dept: PHARMACY | Facility: CLINIC | Age: 40
End: 2020-07-20

## 2020-08-14 ENCOUNTER — TELEPHONE (OUTPATIENT)
Dept: PHARMACY | Facility: CLINIC | Age: 40
End: 2020-08-14

## 2020-09-14 ENCOUNTER — TELEPHONE (OUTPATIENT)
Dept: PHARMACY | Facility: CLINIC | Age: 40
End: 2020-09-14

## 2020-09-14 NOTE — TELEPHONE ENCOUNTER
Refill: Patient will need her next injection by 9/18. Will ship on 9/17 for patient to receive on 9/18. Address confirmed. $5 ([facility]). Meds, allergies and health conditions reviewed. Confirmed correct dose, storage, and administration. Denies any significant issues with Aimovig injections. 0 doses on hand currently. No sharps needed.

## 2020-09-23 ENCOUNTER — PATIENT OUTREACH (OUTPATIENT)
Dept: ADMINISTRATIVE | Facility: OTHER | Age: 40
End: 2020-09-23

## 2020-09-24 ENCOUNTER — OFFICE VISIT (OUTPATIENT)
Dept: OBSTETRICS AND GYNECOLOGY | Facility: CLINIC | Age: 40
End: 2020-09-24
Payer: COMMERCIAL

## 2020-09-24 ENCOUNTER — CLINICAL SUPPORT (OUTPATIENT)
Dept: INTERNAL MEDICINE | Facility: CLINIC | Age: 40
End: 2020-09-24
Payer: COMMERCIAL

## 2020-09-24 VITALS
BODY MASS INDEX: 32.97 KG/M2 | DIASTOLIC BLOOD PRESSURE: 74 MMHG | HEIGHT: 59 IN | WEIGHT: 163.56 LBS | SYSTOLIC BLOOD PRESSURE: 120 MMHG

## 2020-09-24 DIAGNOSIS — Z01.419 WELL WOMAN EXAM WITH ROUTINE GYNECOLOGICAL EXAM: ICD-10-CM

## 2020-09-24 DIAGNOSIS — Z23 IMMUNIZATION DUE: Primary | ICD-10-CM

## 2020-09-24 DIAGNOSIS — Z12.4 PAP SMEAR FOR CERVICAL CANCER SCREENING: ICD-10-CM

## 2020-09-24 DIAGNOSIS — Z23 FLU VACCINE NEED: ICD-10-CM

## 2020-09-24 DIAGNOSIS — Z12.31 VISIT FOR SCREENING MAMMOGRAM: Primary | ICD-10-CM

## 2020-09-24 DIAGNOSIS — Z97.5 NEXPLANON IN PLACE: ICD-10-CM

## 2020-09-24 PROBLEM — R87.618 PAP SMEAR ABNORMALITY OF CERVIX/HUMAN PAPILLOMAVIRUS (HPV) POSITIVE: Status: RESOLVED | Noted: 2019-01-29 | Resolved: 2020-09-24

## 2020-09-24 PROCEDURE — 99999 PR PBB SHADOW E&M-EST. PATIENT-LVL III: CPT | Mod: PBBFAC,,, | Performed by: NURSE PRACTITIONER

## 2020-09-24 PROCEDURE — 99999 PR PBB SHADOW E&M-EST. PATIENT-LVL I: CPT | Mod: PBBFAC,,,

## 2020-09-24 PROCEDURE — 90686 FLU VACCINE (QUAD) GREATER THAN OR EQUAL TO 3YO PRESERVATIVE FREE IM: ICD-10-PCS | Mod: S$GLB,,, | Performed by: INTERNAL MEDICINE

## 2020-09-24 PROCEDURE — 90471 IMMUNIZATION ADMIN: CPT | Mod: S$GLB,,, | Performed by: INTERNAL MEDICINE

## 2020-09-24 PROCEDURE — 99999 PR PBB SHADOW E&M-EST. PATIENT-LVL III: ICD-10-PCS | Mod: PBBFAC,,, | Performed by: NURSE PRACTITIONER

## 2020-09-24 PROCEDURE — 99999 PR PBB SHADOW E&M-EST. PATIENT-LVL I: ICD-10-PCS | Mod: PBBFAC,,,

## 2020-09-24 PROCEDURE — 3008F PR BODY MASS INDEX (BMI) DOCUMENTED: ICD-10-PCS | Mod: CPTII,S$GLB,, | Performed by: NURSE PRACTITIONER

## 2020-09-24 PROCEDURE — 88175 CYTOPATH C/V AUTO FLUID REDO: CPT

## 2020-09-24 PROCEDURE — 90471 FLU VACCINE (QUAD) GREATER THAN OR EQUAL TO 3YO PRESERVATIVE FREE IM: ICD-10-PCS | Mod: S$GLB,,, | Performed by: INTERNAL MEDICINE

## 2020-09-24 PROCEDURE — 3008F BODY MASS INDEX DOCD: CPT | Mod: CPTII,S$GLB,, | Performed by: NURSE PRACTITIONER

## 2020-09-24 PROCEDURE — 99396 PREV VISIT EST AGE 40-64: CPT | Mod: S$GLB,,, | Performed by: NURSE PRACTITIONER

## 2020-09-24 PROCEDURE — 99396 PR PREVENTIVE VISIT,EST,40-64: ICD-10-PCS | Mod: S$GLB,,, | Performed by: NURSE PRACTITIONER

## 2020-09-24 PROCEDURE — 87624 HPV HI-RISK TYP POOLED RSLT: CPT

## 2020-09-24 PROCEDURE — 90686 IIV4 VACC NO PRSV 0.5 ML IM: CPT | Mod: S$GLB,,, | Performed by: INTERNAL MEDICINE

## 2020-09-24 RX ORDER — HYDROXYZINE HYDROCHLORIDE 10 MG/1
TABLET, FILM COATED ORAL
COMMUNITY
Start: 2020-09-01 | End: 2023-01-24 | Stop reason: CLARIF

## 2020-09-24 NOTE — PROGRESS NOTES
"Patient was given vaccine information sheet for the Flu Vaccine. The area of injection was palpated using the acromion process as a landmark. This area was cleaned with alcohol. Using a 25g 1" safety needle, 0.5mL of the vaccine was placed into the left deltoid muscle. The injection site was dressed with a bandage. Patient experienced no complications and was discharged in stable condition. Fluarix vaccine Lot: [id number] Exp: 06/30/2021.      "

## 2020-09-24 NOTE — PROGRESS NOTES
CC: Annual  HPI: Pt is a 40 y.o.  female who presents for routine annual exam. She uses Nexplanon for contraception. She does not want STD screening. Happy with new nexplanon-no bleeding on it. Needs flu shot. Last year was HPV positive, colpo showed LORENA I.     FH:   Breast cancer: none  Colon cancer: none  Ovarian cancer: none  Uterine cancer: none    Flu vaccine: will get today  HPV vaccine:  Last pap smear: 2019- HPV positive, colpo showed LORENA I  Colonoscopy: na  DEXA: na  Mammogram: 2019  STD history: HPV  Birth control: nexplanon  OB history:   Tobacco use: no    ROS:  GENERAL: Feeling well overall. Denies fever or chills.   SKIN: Denies rash or lesions.   HEAD: Denies head injury or headache.   NODES: Denies enlarged lymph nodes.   CHEST: Denies chest pain or shortness of breath.   CARDIOVASCULAR: Denies palpitations or left sided chest pain.   ABDOMEN: No abdominal pain, constipation, diarrhea, nausea, vomiting or rectal bleeding.   URINARY: No dysuria, hematuria, or burning on urination.  REPRODUCTIVE: See HPI.   BREASTS: Denies pain, lumps, or nipple discharge.   HEMATOLOGIC: No easy bruisability or excessive bleeding.   MUSCULOSKELETAL: Denies joint pain or swelling.   NEUROLOGIC: Denies syncope or weakness.   PSYCHIATRIC: Denies depression, anxiety or mood swings.    PE:   APPEARANCE: Well nourished, well developed, White female in no acute distress.  NODES: no cervical, supraclavicular, or inguinal lymphadenopathy  BREASTS: Symmetrical, no skin changes or visible lesions. No palpable masses, nipple discharge or adenopathy bilaterally.  ABDOMEN: Soft. No tenderness or masses. No distention. No hernias palpated. No CVA tenderness.  VULVA: No lesions. Normal external female genitalia.  URETHRAL MEATUS: Normal size and location, no lesions, no prolapse.  URETHRA: No masses, tenderness, or prolapse.  VAGINA: Moist. No lesions or lacerations noted. No abnormal discharge present. No odor present.   CERVIX:  No lesions or discharge. No cervical motion tenderness.   UTERUS: Normal size, regular shape, mobile, non-tender.  ADNEXA: No tenderness. No fullness or masses palpated in the adnexal regions.   ANUS PERINEUM: Normal.      Diagnosis:  1. Visit for screening mammogram    2. Pap smear for cervical cancer screening    3. Well woman exam with routine gynecological exam    4. Nexplanon in place    5. Flu vaccine need        Plan:     Orders Placed This Encounter    HPV High Risk Genotypes, PCR    Mammo Digital Screening Bilat    Influenza - Quadrivalent *Preferred* (6 months+) (PF)    Liquid-Based Pap Smear, Screening       1. MMG  2. Pap/HPV updated  3. Flu vaccine today in pharmacy  4. Nexplanon expires in 2023    Patient was counseled today on the new ACS guidelines for cervical cytology screening as well as the current recommendations for breast cancer screening. She was counseled to follow up with her PCP for other routine health maintenance. Counseling session lasted approximately 10 minutes, and all her questions were answered.    Follow-up with me in 1 year for routine exam; pap in 3 years.

## 2020-10-01 LAB
HPV HR 12 DNA SPEC QL NAA+PROBE: NEGATIVE
HPV16 AG SPEC QL: POSITIVE
HPV18 DNA SPEC QL NAA+PROBE: NEGATIVE

## 2020-10-08 ENCOUNTER — TELEPHONE (OUTPATIENT)
Dept: PHARMACY | Facility: CLINIC | Age: 40
End: 2020-10-08

## 2020-10-15 ENCOUNTER — TELEPHONE (OUTPATIENT)
Dept: OBSTETRICS AND GYNECOLOGY | Facility: CLINIC | Age: 40
End: 2020-10-15

## 2020-10-15 LAB
FINAL PATHOLOGIC DIAGNOSIS: NORMAL
Lab: NORMAL

## 2020-10-15 NOTE — TELEPHONE ENCOUNTER
Contacted pt to schedule COLPO due to positive HPV. Pt did not answer, LVM with call back number.

## 2020-10-16 ENCOUNTER — TELEPHONE (OUTPATIENT)
Dept: OBSTETRICS AND GYNECOLOGY | Facility: CLINIC | Age: 40
End: 2020-10-16

## 2020-10-16 NOTE — TELEPHONE ENCOUNTER
Contacted pt to schedule COLPO. Pt preferred sometime in November, offered pt an appt around 11/16. Pt requested 11/17. I was able to schedule the pt as desired. Pt had no questions or concerns.

## 2020-10-16 NOTE — TELEPHONE ENCOUNTER
----- Message from Marshal Hedrick sent at 10/16/2020  3:42 PM CDT -----  PT RETURNING YOUR CALL [phone]

## 2020-10-23 ENCOUNTER — HOSPITAL ENCOUNTER (OUTPATIENT)
Dept: RADIOLOGY | Facility: HOSPITAL | Age: 40
Discharge: HOME | End: 2020-10-23
Attending: NURSE PRACTITIONER
Payer: COMMERCIAL

## 2020-10-23 DIAGNOSIS — Z12.31 VISIT FOR SCREENING MAMMOGRAM: ICD-10-CM

## 2020-10-23 PROCEDURE — 77067 MAMMO DIGITAL SCREENING BILAT WITH TOMO: ICD-10-PCS | Mod: 26,,, | Performed by: RADIOLOGY

## 2020-10-23 PROCEDURE — 77067 SCR MAMMO BI INCL CAD: CPT | Mod: 26,,, | Performed by: RADIOLOGY

## 2020-10-23 PROCEDURE — 77063 MAMMO DIGITAL SCREENING BILAT WITH TOMO: ICD-10-PCS | Mod: 26,,, | Performed by: RADIOLOGY

## 2020-10-23 PROCEDURE — 77067 SCR MAMMO BI INCL CAD: CPT | Mod: TC

## 2020-10-23 PROCEDURE — 77063 BREAST TOMOSYNTHESIS BI: CPT | Mod: 26,,, | Performed by: RADIOLOGY

## 2020-11-13 ENCOUNTER — TELEPHONE (OUTPATIENT)
Dept: PHARMACY | Facility: CLINIC | Age: 40
End: 2020-11-13

## 2020-11-17 ENCOUNTER — PROCEDURE VISIT (OUTPATIENT)
Dept: OBSTETRICS AND GYNECOLOGY | Facility: CLINIC | Age: 40
End: 2020-11-17
Payer: COMMERCIAL

## 2020-11-17 VITALS
SYSTOLIC BLOOD PRESSURE: 118 MMHG | DIASTOLIC BLOOD PRESSURE: 80 MMHG | HEIGHT: 59 IN | WEIGHT: 162.5 LBS | BODY MASS INDEX: 32.76 KG/M2

## 2020-11-17 DIAGNOSIS — B97.7 HPV IN FEMALE: Primary | ICD-10-CM

## 2020-11-17 PROCEDURE — 88305 TISSUE EXAM BY PATHOLOGIST: CPT | Mod: 26,,, | Performed by: PATHOLOGY

## 2020-11-17 PROCEDURE — 88305 TISSUE EXAM BY PATHOLOGIST: CPT | Mod: 59 | Performed by: PATHOLOGY

## 2020-11-17 PROCEDURE — 88305 TISSUE EXAM BY PATHOLOGIST: ICD-10-PCS | Mod: 26,,, | Performed by: PATHOLOGY

## 2020-11-17 PROCEDURE — 57454 BX/CURETT OF CERVIX W/SCOPE: CPT | Mod: S$GLB,,, | Performed by: OBSTETRICS & GYNECOLOGY

## 2020-11-17 PROCEDURE — 88342 IMHCHEM/IMCYTCHM 1ST ANTB: CPT | Mod: 26,,, | Performed by: PATHOLOGY

## 2020-11-17 PROCEDURE — 88342 IMHCHEM/IMCYTCHM 1ST ANTB: CPT | Performed by: PATHOLOGY

## 2020-11-17 PROCEDURE — 57454 COLPOSCOPY: ICD-10-PCS | Mod: S$GLB,,, | Performed by: OBSTETRICS & GYNECOLOGY

## 2020-11-17 PROCEDURE — 88342 CHG IMMUNOCYTOCHEMISTRY: ICD-10-PCS | Mod: 26,,, | Performed by: PATHOLOGY

## 2020-11-17 NOTE — PROCEDURES
Colposcopy    Date/Time: 11/17/2020 10:30 AM  Performed by: Julie R. Jeansonne, MD  Authorized by: Julie R. Jeansonne, MD     Consent Done?:  Yes (Written)  Timeout:Immediately prior to procedure a time out was called to verify the correct patient, procedure, equipment, support staff and site/side marked as required  Prep:Patient was prepped and draped in the usual sterile fashion  Assistants?: Yes    List of assistants:  Jeanne Phillips I was present for the entire procedure.    Colposcopy Site:  Cervix  Position:  Supine   Patient was prepped and draped in the normal sterile fashion.  Acrowhite Lesion: No    Atypical Vessels: No    Transformation Zone Adequate?: Yes    Biopsy?: Yes         Location:  Cervix ((3 00 and 6 00))  ECC Performed?: Yes    LEEP Performed?: No    Estimated blood loss (cc):  10   Patient tolerated the procedure well with no immediate complications.   Post-operative instructions were provided for the patient.   Patient was discharged and will follow up if any complications occur     Nl cyto, HPV 16+, LORENA 1 on colpo last year (was nl cyto, HPV HR and 16+ last year)

## 2020-11-23 LAB
FINAL PATHOLOGIC DIAGNOSIS: NORMAL
GROSS: NORMAL

## 2020-12-10 ENCOUNTER — TELEPHONE (OUTPATIENT)
Dept: PHARMACY | Facility: CLINIC | Age: 40
End: 2020-12-10

## 2020-12-16 ENCOUNTER — SPECIALTY PHARMACY (OUTPATIENT)
Dept: PHARMACY | Facility: CLINIC | Age: 40
End: 2020-12-16

## 2020-12-16 NOTE — TELEPHONE ENCOUNTER
Specialty Pharmacy - Refill Coordination    Specialty Medication Orders Linked to Encounter      Most Recent Value   Medication #1  erenumab-aooe (AIMOVIG) 70 mg/mL autoinjector (Order#[id number], Rx#[id number])          Refill Questions - Documented Responses      Most Recent Value   Relationship to patient of person spoken to?  Self   HIPAA/medical authority confirmed?  Yes   Any changes in contact preferences or allowed representatives?  No   Has the patient had any insurance changes?  No   Has the patient had any changes to specialty medication, dose, or instructions?  No   Has the patient started taking any new medications, herbals, or supplements?  No   Has the patient been diagnosed with any new medical conditions?  No   Does the patient have any new allergies to medications or foods?  No   Does the patient have any concerns about side effects?  No   Can the patient store medication/sharps container properly (at the correct temperature, away from children/pets, etc.)?  Yes   Can the patient call emergency services (911) in the event of an emergency?  Yes   Does the patient have any concerns or questions about taking or administering this medication as prescribed?  No   How many doses did the patient miss in the past 4 weeks or since the last fill?  0   How many doses does the patient have on hand?  0   How many days does the patient report on hand quantity will last?  0   Does the number of doses/days supply remaining match pharmacy expected amounts?  Yes   Does the patient feel that this medication is effective?  Yes   During the past 4 weeks, has patient missed any activities due to condition or medication?  No   During the past 4 weeks, did patient have any of the following urgent care visits?  None   How will the patient receive the medication?  Mail   When does the patient need to receive the medication?  12/18/20   Shipping Address  Home   Address in [facility] confirmed and updated if  neccessary?  Yes   Expected Copay ($)  5   Is the patient able to afford the medication copay?  Yes   Payment Method  CC on file   Days supply of Refill  28   Would patient like to speak to a pharmacist?  No   Do you want to trigger an intervention?  No   Do you want to trigger an additional referral task?  No   Refill activity completed?  Yes   Refill activity plan  Refill scheduled   Shipment/Pickup Date:  12/16/20          Current Outpatient Medications   Medication Sig    erenumab-aooe (AIMOVIG) 70 mg/mL autoinjector Inject 1 mL (70 mg total) into the skin every 28 days.    hydrOXYzine HCL (ATARAX) 10 MG Tab     sertraline (ZOLOFT) 100 MG tablet Take 1 tablet (100 mg total) by mouth once daily.    sumatriptan (IMITREX) 100 MG tablet Take 1 tab at onset of migraine, can repeat in 2 hrs if needed. Max 2 tabs/day. Max 3 days/week.   Last reviewed on 11/17/2020 11:57 AM by Julie R. Jeansonne, MD    Review of patient's allergies indicates:   Allergen Reactions    Percocet [oxycodone-acetaminophen]     Last reviewed on  11/17/2020 11:57 AM by Julie R Jeansonne      Tasks added this encounter   1/8/2021 - Refill Call (Auto Added)   Tasks due within next 3 months   No tasks due.     Dotty Rodriguez  [facility]  [address] [place] [id number]  Phone: [phone]  Fax: [phone]

## 2021-01-04 ENCOUNTER — PATIENT MESSAGE (OUTPATIENT)
Dept: ADMINISTRATIVE | Facility: HOSPITAL | Age: 41
End: 2021-01-04

## 2021-01-06 ENCOUNTER — PATIENT MESSAGE (OUTPATIENT)
Dept: PHARMACY | Facility: CLINIC | Age: 41
End: 2021-01-06

## 2021-01-19 ENCOUNTER — PATIENT MESSAGE (OUTPATIENT)
Dept: NEUROLOGY | Facility: CLINIC | Age: 41
End: 2021-01-19

## 2021-01-22 ENCOUNTER — LAB VISIT (OUTPATIENT)
Dept: LAB | Facility: HOSPITAL | Age: 41
End: 2021-01-22
Attending: INTERNAL MEDICINE
Payer: COMMERCIAL

## 2021-01-22 ENCOUNTER — OFFICE VISIT (OUTPATIENT)
Dept: INTERNAL MEDICINE | Facility: CLINIC | Age: 41
End: 2021-01-22
Payer: COMMERCIAL

## 2021-01-22 VITALS
BODY MASS INDEX: 33.64 KG/M2 | WEIGHT: 166.88 LBS | HEART RATE: 107 BPM | DIASTOLIC BLOOD PRESSURE: 70 MMHG | HEIGHT: 59 IN | OXYGEN SATURATION: 97 % | SYSTOLIC BLOOD PRESSURE: 124 MMHG

## 2021-01-22 DIAGNOSIS — M79.644 PAIN OF RIGHT MIDDLE FINGER: ICD-10-CM

## 2021-01-22 DIAGNOSIS — Z20.822 EXPOSURE TO COVID-19 VIRUS: ICD-10-CM

## 2021-01-22 DIAGNOSIS — G43.009 MIGRAINE WITHOUT AURA AND WITHOUT STATUS MIGRAINOSUS, NOT INTRACTABLE: ICD-10-CM

## 2021-01-22 DIAGNOSIS — Z00.00 ROUTINE PHYSICAL EXAMINATION: ICD-10-CM

## 2021-01-22 DIAGNOSIS — Z00.00 ROUTINE PHYSICAL EXAMINATION: Primary | ICD-10-CM

## 2021-01-22 DIAGNOSIS — M54.2 NECK PAIN: ICD-10-CM

## 2021-01-22 LAB
ALBUMIN SERPL BCP-MCNC: 3.7 G/DL (ref 3.5–5.2)
ALP SERPL-CCNC: 113 U/L (ref 55–135)
ALT SERPL W/O P-5'-P-CCNC: 26 U/L (ref 10–44)
ANION GAP SERPL CALC-SCNC: 9 MMOL/L (ref 8–16)
AST SERPL-CCNC: 26 U/L (ref 10–40)
BASOPHILS # BLD AUTO: 0.03 K/UL (ref 0–0.2)
BASOPHILS NFR BLD: 0.4 % (ref 0–1.9)
BILIRUB SERPL-MCNC: 0.4 MG/DL (ref 0.1–1)
BUN SERPL-MCNC: 16 MG/DL (ref 6–20)
CALCIUM SERPL-MCNC: 9.3 MG/DL (ref 8.7–10.5)
CHLORIDE SERPL-SCNC: 108 MMOL/L (ref 95–110)
CHOLEST SERPL-MCNC: 195 MG/DL (ref 120–199)
CHOLEST/HDLC SERPL: 3.4 {RATIO} (ref 2–5)
CO2 SERPL-SCNC: 23 MMOL/L (ref 23–29)
CREAT SERPL-MCNC: 0.9 MG/DL (ref 0.5–1.4)
DIFFERENTIAL METHOD: ABNORMAL
EOSINOPHIL # BLD AUTO: 0.1 K/UL (ref 0–0.5)
EOSINOPHIL NFR BLD: 1.2 % (ref 0–8)
ERYTHROCYTE [DISTWIDTH] IN BLOOD BY AUTOMATED COUNT: 13.2 % (ref 11.5–14.5)
EST. GFR  (AFRICAN AMERICAN): >60 ML/MIN/1.73 M^2
EST. GFR  (NON AFRICAN AMERICAN): >60 ML/MIN/1.73 M^2
GLUCOSE SERPL-MCNC: 95 MG/DL (ref 70–110)
HCT VFR BLD AUTO: 44.3 % (ref 37–48.5)
HDLC SERPL-MCNC: 57 MG/DL (ref 40–75)
HDLC SERPL: 29.2 % (ref 20–50)
HGB BLD-MCNC: 14.5 G/DL (ref 12–16)
IMM GRANULOCYTES # BLD AUTO: 0.03 K/UL (ref 0–0.04)
IMM GRANULOCYTES NFR BLD AUTO: 0.4 % (ref 0–0.5)
LDLC SERPL CALC-MCNC: 84 MG/DL (ref 63–159)
LYMPHOCYTES # BLD AUTO: 1.9 K/UL (ref 1–4.8)
LYMPHOCYTES NFR BLD: 25.3 % (ref 18–48)
MCH RBC QN AUTO: 30.3 PG (ref 27–31)
MCHC RBC AUTO-ENTMCNC: 32.7 G/DL (ref 32–36)
MCV RBC AUTO: 93 FL (ref 82–98)
MONOCYTES # BLD AUTO: 0.6 K/UL (ref 0.3–1)
MONOCYTES NFR BLD: 8.4 % (ref 4–15)
NEUTROPHILS # BLD AUTO: 4.9 K/UL (ref 1.8–7.7)
NEUTROPHILS NFR BLD: 64.3 % (ref 38–73)
NONHDLC SERPL-MCNC: 138 MG/DL
NRBC BLD-RTO: 0 /100 WBC
PLATELET # BLD AUTO: 354 K/UL (ref 150–350)
PMV BLD AUTO: 9.5 FL (ref 9.2–12.9)
POTASSIUM SERPL-SCNC: 3.9 MMOL/L (ref 3.5–5.1)
PROT SERPL-MCNC: 7.2 G/DL (ref 6–8.4)
RBC # BLD AUTO: 4.78 M/UL (ref 4–5.4)
SODIUM SERPL-SCNC: 140 MMOL/L (ref 136–145)
TRIGL SERPL-MCNC: 270 MG/DL (ref 30–150)
TSH SERPL DL<=0.005 MIU/L-ACNC: 2.01 UIU/ML (ref 0.4–4)
WBC # BLD AUTO: 7.62 K/UL (ref 3.9–12.7)

## 2021-01-22 PROCEDURE — 84443 ASSAY THYROID STIM HORMONE: CPT

## 2021-01-22 PROCEDURE — 1126F PR PAIN SEVERITY QUANTIFIED, NO PAIN PRESENT: ICD-10-PCS | Mod: S$GLB,,, | Performed by: INTERNAL MEDICINE

## 2021-01-22 PROCEDURE — 99396 PR PREVENTIVE VISIT,EST,40-64: ICD-10-PCS | Mod: S$GLB,,, | Performed by: INTERNAL MEDICINE

## 2021-01-22 PROCEDURE — 86769 SARS-COV-2 COVID-19 ANTIBODY: CPT

## 2021-01-22 PROCEDURE — 3008F PR BODY MASS INDEX (BMI) DOCUMENTED: ICD-10-PCS | Mod: CPTII,S$GLB,, | Performed by: INTERNAL MEDICINE

## 2021-01-22 PROCEDURE — 99999 PR PBB SHADOW E&M-EST. PATIENT-LVL III: CPT | Mod: PBBFAC,,, | Performed by: INTERNAL MEDICINE

## 2021-01-22 PROCEDURE — 80061 LIPID PANEL: CPT

## 2021-01-22 PROCEDURE — 80053 COMPREHEN METABOLIC PANEL: CPT

## 2021-01-22 PROCEDURE — 3008F BODY MASS INDEX DOCD: CPT | Mod: CPTII,S$GLB,, | Performed by: INTERNAL MEDICINE

## 2021-01-22 PROCEDURE — 85025 COMPLETE CBC W/AUTO DIFF WBC: CPT

## 2021-01-22 PROCEDURE — 99999 PR PBB SHADOW E&M-EST. PATIENT-LVL III: ICD-10-PCS | Mod: PBBFAC,,, | Performed by: INTERNAL MEDICINE

## 2021-01-22 PROCEDURE — 1126F AMNT PAIN NOTED NONE PRSNT: CPT | Mod: S$GLB,,, | Performed by: INTERNAL MEDICINE

## 2021-01-22 PROCEDURE — 99396 PREV VISIT EST AGE 40-64: CPT | Mod: S$GLB,,, | Performed by: INTERNAL MEDICINE

## 2021-01-23 LAB — SARS-COV-2 IGG SERPLBLD QL IA.RAPID: NEGATIVE

## 2021-01-25 ENCOUNTER — PATIENT MESSAGE (OUTPATIENT)
Dept: INTERNAL MEDICINE | Facility: CLINIC | Age: 41
End: 2021-01-25

## 2021-01-29 ENCOUNTER — TELEPHONE (OUTPATIENT)
Dept: NEUROLOGY | Facility: CLINIC | Age: 41
End: 2021-01-29

## 2021-02-01 ENCOUNTER — PATIENT MESSAGE (OUTPATIENT)
Dept: INTERNAL MEDICINE | Facility: CLINIC | Age: 41
End: 2021-02-01

## 2021-02-01 ENCOUNTER — LAB VISIT (OUTPATIENT)
Dept: INTERNAL MEDICINE | Facility: CLINIC | Age: 41
End: 2021-02-01
Payer: COMMERCIAL

## 2021-02-01 DIAGNOSIS — R05.9 COUGH: ICD-10-CM

## 2021-02-01 PROCEDURE — U0003 INFECTIOUS AGENT DETECTION BY NUCLEIC ACID (DNA OR RNA); SEVERE ACUTE RESPIRATORY SYNDROME CORONAVIRUS 2 (SARS-COV-2) (CORONAVIRUS DISEASE [COVID-19]), AMPLIFIED PROBE TECHNIQUE, MAKING USE OF HIGH THROUGHPUT TECHNOLOGIES AS DESCRIBED BY CMS-2020-01-R: HCPCS

## 2021-02-02 LAB — SARS-COV-2 RNA RESP QL NAA+PROBE: NOT DETECTED

## 2021-02-12 ENCOUNTER — OFFICE VISIT (OUTPATIENT)
Dept: NEUROLOGY | Facility: CLINIC | Age: 41
End: 2021-02-12
Payer: COMMERCIAL

## 2021-02-12 DIAGNOSIS — F41.9 ANXIETY: ICD-10-CM

## 2021-02-12 DIAGNOSIS — G43.009 MIGRAINE WITHOUT AURA AND WITHOUT STATUS MIGRAINOSUS, NOT INTRACTABLE: Primary | ICD-10-CM

## 2021-02-12 PROCEDURE — 99214 OFFICE O/P EST MOD 30 MIN: CPT | Mod: 95,,, | Performed by: NURSE PRACTITIONER

## 2021-02-12 PROCEDURE — 99214 PR OFFICE/OUTPT VISIT, EST, LEVL IV, 30-39 MIN: ICD-10-PCS | Mod: 95,,, | Performed by: NURSE PRACTITIONER

## 2021-02-12 RX ORDER — SUMATRIPTAN SUCCINATE 100 MG/1
TABLET ORAL
Qty: 27 TABLET | Refills: 3 | Status: SHIPPED | OUTPATIENT
Start: 2021-02-12 | End: 2021-06-25 | Stop reason: SDUPTHER

## 2021-03-12 ENCOUNTER — IMMUNIZATION (OUTPATIENT)
Dept: PRIMARY CARE CLINIC | Facility: CLINIC | Age: 41
End: 2021-03-12
Payer: COMMERCIAL

## 2021-03-12 DIAGNOSIS — Z23 NEED FOR VACCINATION: Primary | ICD-10-CM

## 2021-03-12 PROCEDURE — 91300 PR SARS-COV- 2 COVID-19 VACCINE, NO PRSV, 30MCG/0.3ML, IM: ICD-10-PCS | Mod: S$GLB,,, | Performed by: INTERNAL MEDICINE

## 2021-03-12 PROCEDURE — 0001A PR IMMUNIZ ADMIN, SARS-COV-2 COVID-19 VACC, 30MCG/0.3ML, 1ST DOSE: CPT | Mod: CV19,S$GLB,, | Performed by: INTERNAL MEDICINE

## 2021-03-12 PROCEDURE — 91300 PR SARS-COV- 2 COVID-19 VACCINE, NO PRSV, 30MCG/0.3ML, IM: CPT | Mod: S$GLB,,, | Performed by: INTERNAL MEDICINE

## 2021-03-12 PROCEDURE — 0001A PR IMMUNIZ ADMIN, SARS-COV-2 COVID-19 VACC, 30MCG/0.3ML, 1ST DOSE: ICD-10-PCS | Mod: CV19,S$GLB,, | Performed by: INTERNAL MEDICINE

## 2021-03-12 RX ADMIN — Medication 0.3 ML: at 03:03

## 2021-04-02 ENCOUNTER — IMMUNIZATION (OUTPATIENT)
Dept: PRIMARY CARE CLINIC | Facility: CLINIC | Age: 41
End: 2021-04-02
Payer: COMMERCIAL

## 2021-04-02 DIAGNOSIS — Z23 NEED FOR VACCINATION: Primary | ICD-10-CM

## 2021-04-02 PROCEDURE — 0002A PR IMMUNIZ ADMIN, SARS-COV-2 COVID-19 VACC, 30MCG/0.3ML, 2ND DOSE: ICD-10-PCS | Mod: CV19,S$GLB,, | Performed by: INTERNAL MEDICINE

## 2021-04-02 PROCEDURE — 0002A PR IMMUNIZ ADMIN, SARS-COV-2 COVID-19 VACC, 30MCG/0.3ML, 2ND DOSE: CPT | Mod: CV19,S$GLB,, | Performed by: INTERNAL MEDICINE

## 2021-04-02 PROCEDURE — 91300 PR SARS-COV- 2 COVID-19 VACCINE, NO PRSV, 30MCG/0.3ML, IM: CPT | Mod: S$GLB,,, | Performed by: INTERNAL MEDICINE

## 2021-04-02 PROCEDURE — 91300 PR SARS-COV- 2 COVID-19 VACCINE, NO PRSV, 30MCG/0.3ML, IM: ICD-10-PCS | Mod: S$GLB,,, | Performed by: INTERNAL MEDICINE

## 2021-04-02 RX ADMIN — Medication 0.3 ML: at 04:04

## 2021-04-14 ENCOUNTER — TELEPHONE (OUTPATIENT)
Dept: PHARMACY | Facility: CLINIC | Age: 41
End: 2021-04-14

## 2021-05-07 ENCOUNTER — PATIENT MESSAGE (OUTPATIENT)
Dept: NEUROLOGY | Facility: CLINIC | Age: 41
End: 2021-05-07

## 2021-05-07 DIAGNOSIS — G43.009 MIGRAINE WITHOUT AURA AND WITHOUT STATUS MIGRAINOSUS, NOT INTRACTABLE: Primary | ICD-10-CM

## 2021-05-07 RX ORDER — ERENUMAB-AOOE 140 MG/ML
140 INJECTION, SOLUTION SUBCUTANEOUS
Qty: 1 ML | Refills: 11 | Status: SHIPPED | OUTPATIENT
Start: 2021-05-07 | End: 2022-02-04 | Stop reason: SDUPTHER

## 2021-06-25 ENCOUNTER — OFFICE VISIT (OUTPATIENT)
Dept: NEUROLOGY | Facility: CLINIC | Age: 41
End: 2021-06-25
Payer: COMMERCIAL

## 2021-06-25 ENCOUNTER — PATIENT MESSAGE (OUTPATIENT)
Dept: NEUROLOGY | Facility: CLINIC | Age: 41
End: 2021-06-25

## 2021-06-25 DIAGNOSIS — G43.009 MIGRAINE WITHOUT AURA AND WITHOUT STATUS MIGRAINOSUS, NOT INTRACTABLE: Primary | ICD-10-CM

## 2021-06-25 DIAGNOSIS — F41.9 ANXIETY: ICD-10-CM

## 2021-06-25 PROCEDURE — 99214 OFFICE O/P EST MOD 30 MIN: CPT | Mod: 95,,, | Performed by: NURSE PRACTITIONER

## 2021-06-25 PROCEDURE — 99214 PR OFFICE/OUTPT VISIT, EST, LEVL IV, 30-39 MIN: ICD-10-PCS | Mod: 95,,, | Performed by: NURSE PRACTITIONER

## 2021-06-25 RX ORDER — SUMATRIPTAN SUCCINATE 100 MG/1
TABLET ORAL
Qty: 27 TABLET | Refills: 3 | Status: SHIPPED | OUTPATIENT
Start: 2021-06-25 | End: 2021-08-06 | Stop reason: SDUPTHER

## 2021-07-12 ENCOUNTER — PATIENT MESSAGE (OUTPATIENT)
Dept: NEUROLOGY | Facility: CLINIC | Age: 41
End: 2021-07-12

## 2021-08-06 ENCOUNTER — OFFICE VISIT (OUTPATIENT)
Dept: NEUROLOGY | Facility: CLINIC | Age: 41
End: 2021-08-06
Payer: COMMERCIAL

## 2021-08-06 DIAGNOSIS — G43.009 MIGRAINE WITHOUT AURA AND WITHOUT STATUS MIGRAINOSUS, NOT INTRACTABLE: Primary | ICD-10-CM

## 2021-08-06 DIAGNOSIS — F41.9 ANXIETY: ICD-10-CM

## 2021-08-06 PROCEDURE — 1159F MED LIST DOCD IN RCRD: CPT | Mod: CPTII,95,, | Performed by: NURSE PRACTITIONER

## 2021-08-06 PROCEDURE — 99214 OFFICE O/P EST MOD 30 MIN: CPT | Mod: 95,,, | Performed by: NURSE PRACTITIONER

## 2021-08-06 PROCEDURE — 1160F PR REVIEW ALL MEDS BY PRESCRIBER/CLIN PHARMACIST DOCUMENTED: ICD-10-PCS | Mod: CPTII,95,, | Performed by: NURSE PRACTITIONER

## 2021-08-06 PROCEDURE — 1160F RVW MEDS BY RX/DR IN RCRD: CPT | Mod: CPTII,95,, | Performed by: NURSE PRACTITIONER

## 2021-08-06 PROCEDURE — 1159F PR MEDICATION LIST DOCUMENTED IN MEDICAL RECORD: ICD-10-PCS | Mod: CPTII,95,, | Performed by: NURSE PRACTITIONER

## 2021-08-06 PROCEDURE — 99214 PR OFFICE/OUTPT VISIT, EST, LEVL IV, 30-39 MIN: ICD-10-PCS | Mod: 95,,, | Performed by: NURSE PRACTITIONER

## 2021-08-06 RX ORDER — SUMATRIPTAN SUCCINATE 100 MG/1
TABLET ORAL
Qty: 27 TABLET | Refills: 3 | Status: SHIPPED | OUTPATIENT
Start: 2021-08-06 | End: 2022-06-13 | Stop reason: SDUPTHER

## 2021-08-06 RX ORDER — SERTRALINE HYDROCHLORIDE 100 MG/1
100 TABLET, FILM COATED ORAL DAILY
Qty: 90 TABLET | Refills: 3 | Status: SHIPPED | OUTPATIENT
Start: 2021-08-06 | End: 2022-07-06

## 2021-09-03 ENCOUNTER — PATIENT MESSAGE (OUTPATIENT)
Dept: NEUROLOGY | Facility: CLINIC | Age: 41
End: 2021-09-03

## 2021-09-23 ENCOUNTER — PATIENT MESSAGE (OUTPATIENT)
Dept: NEUROLOGY | Facility: CLINIC | Age: 41
End: 2021-09-23

## 2021-11-04 ENCOUNTER — TELEPHONE (OUTPATIENT)
Dept: OBSTETRICS AND GYNECOLOGY | Facility: CLINIC | Age: 41
End: 2021-11-04
Payer: COMMERCIAL

## 2021-11-04 DIAGNOSIS — Z12.31 SCREENING MAMMOGRAM, ENCOUNTER FOR: Primary | ICD-10-CM

## 2021-12-02 ENCOUNTER — HOSPITAL ENCOUNTER (OUTPATIENT)
Dept: RADIOLOGY | Facility: HOSPITAL | Age: 41
Discharge: HOME | End: 2021-12-02
Attending: OBSTETRICS & GYNECOLOGY
Payer: COMMERCIAL

## 2021-12-02 VITALS — BODY MASS INDEX: 35.35 KG/M2 | WEIGHT: 175 LBS

## 2021-12-02 DIAGNOSIS — Z12.31 SCREENING MAMMOGRAM, ENCOUNTER FOR: ICD-10-CM

## 2021-12-02 PROCEDURE — 77067 SCR MAMMO BI INCL CAD: CPT | Mod: 26,,, | Performed by: RADIOLOGY

## 2021-12-02 PROCEDURE — 77067 SCR MAMMO BI INCL CAD: CPT | Mod: TC

## 2021-12-02 PROCEDURE — 77067 MAMMO DIGITAL SCREENING BILAT WITH TOMO: ICD-10-PCS | Mod: 26,,, | Performed by: RADIOLOGY

## 2021-12-02 PROCEDURE — 77063 BREAST TOMOSYNTHESIS BI: CPT | Mod: 26,,, | Performed by: RADIOLOGY

## 2021-12-02 PROCEDURE — 77063 MAMMO DIGITAL SCREENING BILAT WITH TOMO: ICD-10-PCS | Mod: 26,,, | Performed by: RADIOLOGY

## 2021-12-20 ENCOUNTER — PATIENT MESSAGE (OUTPATIENT)
Dept: NEUROLOGY | Facility: CLINIC | Age: 41
End: 2021-12-20
Payer: COMMERCIAL

## 2021-12-20 DIAGNOSIS — G43.009 MIGRAINE WITHOUT AURA AND WITHOUT STATUS MIGRAINOSUS, NOT INTRACTABLE: Primary | ICD-10-CM

## 2021-12-21 RX ORDER — UBROGEPANT 100 MG/1
100 TABLET ORAL ONCE AS NEEDED
Qty: 10 TABLET | Refills: 5 | Status: SHIPPED | OUTPATIENT
Start: 2021-12-21 | End: 2021-12-29

## 2021-12-21 RX ORDER — RIMEGEPANT SULFATE 75 MG/75MG
75 TABLET, ORALLY DISINTEGRATING ORAL DAILY PRN
Qty: 8 TABLET | Refills: 2 | Status: SHIPPED | OUTPATIENT
Start: 2021-12-21 | End: 2021-12-21

## 2021-12-23 ENCOUNTER — TELEPHONE (OUTPATIENT)
Dept: PHARMACY | Facility: CLINIC | Age: 41
End: 2021-12-23
Payer: COMMERCIAL

## 2022-01-26 ENCOUNTER — PATIENT MESSAGE (OUTPATIENT)
Dept: ADMINISTRATIVE | Facility: HOSPITAL | Age: 42
End: 2022-01-26
Payer: COMMERCIAL

## 2022-02-04 ENCOUNTER — OFFICE VISIT (OUTPATIENT)
Dept: NEUROLOGY | Facility: CLINIC | Age: 42
End: 2022-02-04
Payer: COMMERCIAL

## 2022-02-04 DIAGNOSIS — F41.9 ANXIETY: ICD-10-CM

## 2022-02-04 DIAGNOSIS — G43.009 MIGRAINE WITHOUT AURA AND WITHOUT STATUS MIGRAINOSUS, NOT INTRACTABLE: Primary | ICD-10-CM

## 2022-02-04 PROCEDURE — 1160F RVW MEDS BY RX/DR IN RCRD: CPT | Mod: CPTII,95,, | Performed by: NURSE PRACTITIONER

## 2022-02-04 PROCEDURE — 1160F PR REVIEW ALL MEDS BY PRESCRIBER/CLIN PHARMACIST DOCUMENTED: ICD-10-PCS | Mod: CPTII,95,, | Performed by: NURSE PRACTITIONER

## 2022-02-04 PROCEDURE — 1159F PR MEDICATION LIST DOCUMENTED IN MEDICAL RECORD: ICD-10-PCS | Mod: CPTII,95,, | Performed by: NURSE PRACTITIONER

## 2022-02-04 PROCEDURE — 99214 OFFICE O/P EST MOD 30 MIN: CPT | Mod: 95,,, | Performed by: NURSE PRACTITIONER

## 2022-02-04 PROCEDURE — 99214 PR OFFICE/OUTPT VISIT, EST, LEVL IV, 30-39 MIN: ICD-10-PCS | Mod: 95,,, | Performed by: NURSE PRACTITIONER

## 2022-02-04 PROCEDURE — 1159F MED LIST DOCD IN RCRD: CPT | Mod: CPTII,95,, | Performed by: NURSE PRACTITIONER

## 2022-02-04 RX ORDER — ERENUMAB-AOOE 140 MG/ML
140 INJECTION, SOLUTION SUBCUTANEOUS
Qty: 1 ML | Refills: 11 | Status: SHIPPED | OUTPATIENT
Start: 2022-02-04 | End: 2022-05-06 | Stop reason: ALTCHOICE

## 2022-02-04 NOTE — PROGRESS NOTES
Established Patient   SUBJECTIVE:  Patient ID: Kelly Thomason   Chief Complaint: Follow-up    History of Present Illness:  Kelly Thomason is a 41 y.o. female who presents for follow-up of headaches via virtual visit.     The patient location is: in [place]   The chief complaint leading to consultation is: Follow-up  Visit type: Virtual visit with synchronous audio and video  Total time spent with patient: 7 minutes   Each patient to whom he or she provides medical services by telemedicine is:  (1) informed of the relationship between the physician and patient and the respective role of any other health care provider with respect to management of the patient; and (2) notified that he or she may decline to receive medical services by telemedicine and may withdraw from such care at any time.    Recommendations made at last Office Visit on 8/6/21:  - For migraine prevention - continue Aimovig 140 mg SQ monthly   - For acute migraines - sumatriptan 100 mg tabs   - Anxiety - stable on zoloft 100 mg tabs  - Refills provided    - Continue tracking headaches   - RTC in 6 months or sooner if needed     02/04/2022 - Interval History:  Generally speaking migraines have remained well controlled over the last 6 months.  Did experience a slight uptick in migraine frequency in December, sent message via patient portal stating sumatriptan/naproxen had not been working, was given new prescription for ubrelvy 100 mg tabs, unfortunately it took sometime for insurance to approve ubrelvy, by the time she picked up prescription from pharmacy her migraine had subsided.  She has not tried ubrelvy yet as for all subsequent migraines sumatriptan/naproxen has been effective.  Migraines returned to baseline frequency in January. Reports she is currently suffering with 3-4 headaches per month, migraines she does experience are not debilitating, she does not consider migraines to be severe.  More often than not sumatriptan 100 mg + naproxen  "is effective in aborting her migraines.      Otherwise, information below is still accurate and current.     08/06/2021 - Interval History:  She has been suffering with a cold, sinus congestion, coughing for the last 3 weeks, which has subsequently triggered headaches and pressure.   Outside of the last few weeks, migraines have been well controlled.  She has continued using Aimovig 140 mg SQ monthly for migraine prevention which has been helping.  Treats acute migraines with sumatriptan 100 mg tabs.  She is pleased with the current state of her migraines, does not wish to make adjustments to her treatment plan at this time.      Otherwise, information below is still accurate and current.      06/25/2021 - Interval History:  Aimovig was increased to 140 mg dose on 5/7/21 due to increased frequency of migraines.  She does feel increasing her dose of Aimovig has helped some, however the increased heat seems to trigger more frequent migraines.  She has been focusing on increasing her hydration.  Currently suffering with a migraine once every 1-2 weeks.  Feels migraines triggered by heat are more difficult to treat.  Typically 3-4 migraines per month.  Treats acute migraines with sumatriptan 100 mg tabs, which helps, however can have migraines persist for more than 1 day in duration.     Otherwise, information below is still accurate and current.      02/12/2021 - Interval History:  Migraines continue to be well controlled on Aimovig 70 mg SQ monthly, she has begun to notice the week she is due for her injection she begins to get mild headaches, no full blown migraines which she is very pleased about!  Despite experiencing mild headaches in the days leading up to her next dose of aimovig, she does not wish to make any adjustments to her treatment plan at this time.       Otherwise, information below is still accurate and current.      12/02/2019 - Interval History:  Migraines are "great", has only been experiencing 1-2 " "migraines per month, most of which are resolved by excedrin, occasionally has to take sumatriptan.  She picked up a 3 month supply of sumatriptan last January and still has 10 pills left!  She has been using Aimovig 70 mg SQ monthly, which has been very effective. She does experiencing itching to the injection site, which is relieved by ice.  She is very happy with the current state of her migraines and does not wish to make any further adjustments to her treatment plan at this time.       Otherwise, information below is still accurate and current.      01/03/2019 - Interval History:  Headaches and migraines have begun to occur more frequently, despite Botox injections, she no longer feels Botox is helping her migraines and wants to discuss changing her migraine prevention.  She has tried multiple oral medications as well as Botox without significant relief.  She is interested in trying a CGRP inhibitor for migraine prevention as well as discontinuing Botox injections. Migraines continue to feel the same as they always have, she denies any change in the quality or nature of her migraines.       11/13/2018- Interval History:   presents to clinic for third round of Botox, after her second round of Botox she noticed a decrease in the frequency of her migraines, admits she has been under increased stress over the last few weeks and migraines have not been significantly more frequent, which they would have been prior to beginning Botox injections.  Prior to beginning Botox, she was suffering with 1-2 debilitating migraines per week, over the last twelve weeks she feels she has only had 1-2 debilitating migraines and "a couple" moderate to severe migraines.  Sumatriptan continues to give her good relief, on only one occasion sumatriptan has not been effective following the first dose.  She has questions regarding alternative treatment options including Aimovig, Frova and Cambia.  Discussed with regards to Aimovig, I " would prefer to have her continue with Botox injections, as they have been quite effective in minimizing the burden of her migraines, for at least 3 rounds.  If after round 3 she would like to stop Botox and consider alternative options I would be agreeable to this plan.  I would not recommend her use Frova for migraine abortive as the onset of action is quite slow and her migraines intensify to peak fairly quickly.  Cambia could be used to supplement Sumatriptan in place of Naprosyn, however after learning Cambia is similar to Naprosyn, she feels more comfortable with continuing with use of Naprosyn.       08/27/2018- Interval History:  She does feel migraine frequency has decreased after first dose of Botox, also feels migraines are more responsive to medication.  She denies any presence of side effects from Botox injections and wishes to continue with treatment at this time.      06/04/2018- Interval History:  Zomig did not give her any relief, Sumatriptan gives her more relief.  Prednisone did help slightly to break up headache cycle.  She has only been taking Sumatriptan 1-2 times per week.  She continues to state she does not wish to add daily medication to prevention.  Headaches have not changed in nature or quality.  Risks, Benefits, and potential side effects of Botox discussed with patient.  Alternative treatments offered.  After thorough discussed regarding the procedure, patient has decided to move forward with initiating Botox injections for Chronic Migraine.  Patient understands we will complete 3 rounds of injections, if after 3 rounds, we do not see a 50% reduction in headaches, we will discontinue Botox injections.      04/23/2018 - Interval History:  At last appointment with Dr. Holm in January 2017, it was suggested  take 1/2 tab Sumatriptan 100 mg with 1 1/2 tabs aleve for migraine abortive.  For a while her migraines had been great, states she only rarely expereinced any migraines  during pregnancy as well as while she was breast feeding.  She stopped breast feeding last summer and migraines have since gradually become more frequent.  She reports having a headache nearly everyday with full blown migraines occurring 1-2 times per week, each lasting a full day.  She has tried using sumatriptan with aleve, however she does not find this effective.  She has suffered from migraines since she was 14 years old, she denies any change in the quality or nature of her headaches, this is how they have always been, just becoming more frequent.  Migraine is described as a pounding pain typically located behind her eye (can be either right or left sided) and temple, radiating into the neck.  Denies presence of aura symptoms.  Migraines are associated with nausea, photophobia, phonophobia.  Worsened with movement/activity.  Rates pain from 6-7 out of 10.  Additionally, she has noticed when she gets a migraine while on her menstrual cycle, the intensity is much worse, headache is harder to break, and duration is significantly longer, these headaches come the week before her menstrual cycle.  She is currently on Zoloft for prevention, but has tried numerous preventive and abortive options in the past (listed below).    Additionally, she has secondary complaints of intermittent lateral double vision which only occurs when she is either driving or watching tv.  She has noticed it typically only occurs after she has been watching tv for a while or has been driving for a good bit.  She believes the double vision resolves after she gets out of her car or walks away from the television but is unsure.  She has seen her optometrist who did not find any ocular cause for these symptoms.  She feels this has been going on for over a year.  When she gets double vision, if she closes on of her eyes these symptoms will resolve.      Notes from Dr. Holm:  History: Patient presents for follow-up for her migraine headaches.   Insurance will no longer cover Treximet, so she would like to have sumatriptan prescription and be able to add Aleve to it.  She typically takes half tablet of the Treximet.  We will give her the sumatriptan 100 mg which she can take half a tablet of in 1-1-1/2 Aleve with it.  Patient had been pregnant with no headaches for a while.  She has had increased frequency in the last 6 months with 2-3 headaches per month with good response to the Treximet.  She had tried that Cephaly  device but complains she doesn't have time to 2 minutes with a 2-year-old.  Previous note: 10-3-13: This is a 33-year-old white female who presents for followup for her migraine headaches. Patient was previously seen by Dr. Nicolle Chatman who is no longer here. Patient describes a 20 year history of migraine headaches. She describes right or left temporal pain radiating to the hand eye causing aching/deep pain in the eye radiating into the neck. Headache intensity is 5-6/10 associated with nausea, photophobia, sonophobia and lasting all day. The patient has no aura. She generally takes a quarter tablet of Treximet with relief in 30-60 minutes. She takes Cymbalta 30 mg and nortriptyline 25 mg h.s. for migraine prevention. Overall she has been doing quite well with this medication regimen and is here predominantly to establish with a new neurologist.       Therapies Tried & Response:   Pamelor - unsure   Treximet - helped   Cefaly -   Sumatriptan -   Cymbalta - unsure  Zoloft -   Tramadol -   Excedrin -   Effexor - unsure   Topiramate - unsure, cannot try again hx of kidney stone  Zomig NS -   Zomig PO -   Lamictal   Elavil - weight gain   Magnesium -   relpax -   Paxil - no   Midrin - ?  Darvocet -   Nadolol  Prednisone -    Baclofen -   Zomig PO -   Botox -helping   Aimovig - effective   Ubrelvy - has not tried yet     Current Medications:    erenumab-aooe (AIMOVIG AUTOINJECTOR) 140 mg/mL autoinjector, Inject 1 mL (140 mg total) into the  skin every 28 days., Disp: 1 mL, Rfl: 11    hydrOXYzine HCL (ATARAX) 10 MG Tab, , Disp: , Rfl:     sertraline (ZOLOFT) 100 MG tablet, Take 1 tablet (100 mg total) by mouth once daily., Disp: 90 tablet, Rfl: 3    sumatriptan (IMITREX) 100 MG tablet, Take 1 tab at onset of migraine, can repeat in 2 hrs if needed. Max 2 tabs/day. Max 3 days/week., Disp: 27 tablet, Rfl: 3    ubrogepant (UBRELVY) 100 mg tablet, Take 1 tablet (100 mg total) by mouth every 2 (two) hours as needed for Migraine. Max 200 mg/day., Disp: 10 tablet, Rfl: 2    Current Facility-Administered Medications:     etonogestreL subdermal device 68 mg, 68 mg, , , Paola Tony NP, 68 mg at 05/21/20 1040    Review of Systems - A review of 10+ systems was conducted with pertinent positive and negative findings documented in HPI with all other systems reviewed and negative.    PFSH: Past medical, family, and social history reviewed as documented in chart with pertinent positive medical, family, and social history detailed in HPI.    OBJECTIVE:  Vitals: There were no vitals taken for this visit.     Physical Exam:  Constitutional: she appears well-developed and well-nourished. she is well groomed. NAD.     Review of Data:   Notes from internal medicine reviewed   Labs:  No visits with results within 6 Month(s) from this visit.   Latest known visit with results is:   Lab Visit on 02/01/2021   Component Date Value Ref Range Status    SARS-CoV2 (COVID-19) Qualitative P* 02/01/2021 Not Detected  Not Detected Final   Results for SHANNON QUIROZ (MRN [id number]) as of 2/11/2022 06:02   Ref. Range 1/22/2021 14:34   WBC Latest Ref Range: 3.90 - 12.70 K/uL 7.62   RBC Latest Ref Range: 4.00 - 5.40 M/uL 4.78   Hemoglobin Latest Ref Range: 12.0 - 16.0 g/dL 14.5   Hematocrit Latest Ref Range: 37.0 - 48.5 % 44.3   MCV Latest Ref Range: 82 - 98 fL 93   MCH Latest Ref Range: 27.0 - 31.0 pg 30.3   MCHC Latest Ref Range: 32.0 - 36.0 g/dL 32.7   RDW Latest Ref Range: 11.5  - 14.5 % 13.2   Platelets Latest Ref Range: 150 - 350 K/uL 354 (H)   MPV Latest Ref Range: 9.2 - 12.9 fL 9.5   Gran % Latest Ref Range: 38.0 - 73.0 % 64.3   Lymph % Latest Ref Range: 18.0 - 48.0 % 25.3   Mono % Latest Ref Range: 4.0 - 15.0 % 8.4   Eosinophil % Latest Ref Range: 0.0 - 8.0 % 1.2   Basophil % Latest Ref Range: 0.0 - 1.9 % 0.4   Immature Granulocytes Latest Ref Range: 0.0 - 0.5 % 0.4   Gran # (ANC) Latest Ref Range: 1.8 - 7.7 K/uL 4.9   Lymph # Latest Ref Range: 1.0 - 4.8 K/uL 1.9   Mono # Latest Ref Range: 0.3 - 1.0 K/uL 0.6   Eos # Latest Ref Range: 0.0 - 0.5 K/uL 0.1   Baso # Latest Ref Range: 0.00 - 0.20 K/uL 0.03   Immature Grans (Abs) Latest Ref Range: 0.00 - 0.04 K/uL 0.03   nRBC Latest Ref Range: 0 /100 WBC 0   Differential Method Unknown Automated   Sodium Latest Ref Range: 136 - 145 mmol/L 140   Potassium Latest Ref Range: 3.5 - 5.1 mmol/L 3.9   Chloride Latest Ref Range: 95 - 110 mmol/L 108   CO2 Latest Ref Range: 23 - 29 mmol/L 23   Anion Gap Latest Ref Range: 8 - 16 mmol/L 9   BUN Latest Ref Range: 6 - 20 mg/dL 16   Creatinine Latest Ref Range: 0.5 - 1.4 mg/dL 0.9   eGFR if non African American Latest Ref Range: >60 mL/min/1.73 m^2 >60.0   eGFR if African American Latest Ref Range: >60 mL/min/1.73 m^2 >60.0   Glucose Latest Ref Range: 70 - 110 mg/dL 95   Calcium Latest Ref Range: 8.7 - 10.5 mg/dL 9.3   Alkaline Phosphatase Latest Ref Range: 55 - 135 U/L 113   PROTEIN TOTAL Latest Ref Range: 6.0 - 8.4 g/dL 7.2   Albumin Latest Ref Range: 3.5 - 5.2 g/dL 3.7   BILIRUBIN TOTAL Latest Ref Range: 0.1 - 1.0 mg/dL 0.4   AST Latest Ref Range: 10 - 40 U/L 26   ALT Latest Ref Range: 10 - 44 U/L 26   Triglycerides Latest Ref Range: 30 - 150 mg/dL 270 (H)   Cholesterol Latest Ref Range: 120 - 199 mg/dL 195   HDL Latest Ref Range: 40 - 75 mg/dL 57   HDL/Cholesterol Ratio Latest Ref Range: 20.0 - 50.0 % 29.2   LDL Cholesterol External Latest Ref Range: 63.0 - 159.0 mg/dL 84.0   Non-HDL Cholesterol Latest  Units: mg/dL 138   Total Cholesterol/HDL Ratio Latest Ref Range: 2.0 - 5.0  3.4   TSH Latest Ref Range: 0.400 - 4.000 uIU/mL 2.012   Antibody SARS CoV-2 Latest Ref Range: Negative  Negative     Imaging:  No results found for this or any previous visit.  Note: I have independently reviewed any/all imaging/labs/tests and agree with the report (s) as documented.  Any discrepancies will be as noted/demarcated by free text.  JOJO, DINORAP-C 2/4/2022    ASSESSMENT:  1. Migraine without aura and without status migrainosus, not intractable    2. Anxiety      PLAN:  - For migraine prevention - continue aimovig 140 mg SQ monthly   - For acute migraines - sumatriptan 100 mg tabs + naproxen   - Given ubrelvy 100 mg tabs to have on hand if sumatriptan is ineffective   - Anxiety - stable on sertraline 100 mg tabs, management per PCP   - Continue tracking headaches   - RTC in 3-6 months or sooner if needed     Orders Placed This Encounter    erenumab-aooe (AIMOVIG AUTOINJECTOR) 140 mg/mL autoinjector    ubrogepant (UBRELVY) 100 mg tablet     Questions and concerns were sought and answered to the patient's stated verbal satisfaction.  The patient verbalizes understanding and agreement with the above stated treatment plan.     CC: MD Makayla Bhatt, FNP-C  [facility]   [phone]    Dr. Perez was available during today's encounter.

## 2022-02-14 ENCOUNTER — OFFICE VISIT (OUTPATIENT)
Dept: OBSTETRICS AND GYNECOLOGY | Facility: CLINIC | Age: 42
End: 2022-02-14
Payer: COMMERCIAL

## 2022-02-14 VITALS
DIASTOLIC BLOOD PRESSURE: 76 MMHG | WEIGHT: 178.38 LBS | BODY MASS INDEX: 35.96 KG/M2 | HEIGHT: 59 IN | SYSTOLIC BLOOD PRESSURE: 122 MMHG

## 2022-02-14 DIAGNOSIS — Z87.42 HISTORY OF ABNORMAL CERVICAL PAP SMEAR: ICD-10-CM

## 2022-02-14 DIAGNOSIS — Z01.419 ENCOUNTER FOR ANNUAL ROUTINE GYNECOLOGICAL EXAMINATION: Primary | ICD-10-CM

## 2022-02-14 DIAGNOSIS — Z12.31 BREAST CANCER SCREENING BY MAMMOGRAM: ICD-10-CM

## 2022-02-14 PROCEDURE — 1159F MED LIST DOCD IN RCRD: CPT | Mod: CPTII,S$GLB,, | Performed by: OBSTETRICS & GYNECOLOGY

## 2022-02-14 PROCEDURE — 3008F BODY MASS INDEX DOCD: CPT | Mod: CPTII,S$GLB,, | Performed by: OBSTETRICS & GYNECOLOGY

## 2022-02-14 PROCEDURE — 99999 PR PBB SHADOW E&M-EST. PATIENT-LVL III: ICD-10-PCS | Mod: PBBFAC,,, | Performed by: OBSTETRICS & GYNECOLOGY

## 2022-02-14 PROCEDURE — 99999 PR PBB SHADOW E&M-EST. PATIENT-LVL III: CPT | Mod: PBBFAC,,, | Performed by: OBSTETRICS & GYNECOLOGY

## 2022-02-14 PROCEDURE — 99396 PR PREVENTIVE VISIT,EST,40-64: ICD-10-PCS | Mod: S$GLB,,, | Performed by: OBSTETRICS & GYNECOLOGY

## 2022-02-14 PROCEDURE — 3008F PR BODY MASS INDEX (BMI) DOCUMENTED: ICD-10-PCS | Mod: CPTII,S$GLB,, | Performed by: OBSTETRICS & GYNECOLOGY

## 2022-02-14 PROCEDURE — 1160F RVW MEDS BY RX/DR IN RCRD: CPT | Mod: CPTII,S$GLB,, | Performed by: OBSTETRICS & GYNECOLOGY

## 2022-02-14 PROCEDURE — 3078F PR MOST RECENT DIASTOLIC BLOOD PRESSURE < 80 MM HG: ICD-10-PCS | Mod: CPTII,S$GLB,, | Performed by: OBSTETRICS & GYNECOLOGY

## 2022-02-14 PROCEDURE — 1160F PR REVIEW ALL MEDS BY PRESCRIBER/CLIN PHARMACIST DOCUMENTED: ICD-10-PCS | Mod: CPTII,S$GLB,, | Performed by: OBSTETRICS & GYNECOLOGY

## 2022-02-14 PROCEDURE — 3074F SYST BP LT 130 MM HG: CPT | Mod: CPTII,S$GLB,, | Performed by: OBSTETRICS & GYNECOLOGY

## 2022-02-14 PROCEDURE — 1159F PR MEDICATION LIST DOCUMENTED IN MEDICAL RECORD: ICD-10-PCS | Mod: CPTII,S$GLB,, | Performed by: OBSTETRICS & GYNECOLOGY

## 2022-02-14 PROCEDURE — 3078F DIAST BP <80 MM HG: CPT | Mod: CPTII,S$GLB,, | Performed by: OBSTETRICS & GYNECOLOGY

## 2022-02-14 PROCEDURE — 87625 HPV TYPES 16 & 18 ONLY: CPT | Mod: 59 | Performed by: OBSTETRICS & GYNECOLOGY

## 2022-02-14 PROCEDURE — 87624 HPV HI-RISK TYP POOLED RSLT: CPT | Performed by: OBSTETRICS & GYNECOLOGY

## 2022-02-14 PROCEDURE — 99396 PREV VISIT EST AGE 40-64: CPT | Mod: S$GLB,,, | Performed by: OBSTETRICS & GYNECOLOGY

## 2022-02-14 PROCEDURE — 3074F PR MOST RECENT SYSTOLIC BLOOD PRESSURE < 130 MM HG: ICD-10-PCS | Mod: CPTII,S$GLB,, | Performed by: OBSTETRICS & GYNECOLOGY

## 2022-02-14 PROCEDURE — 88175 CYTOPATH C/V AUTO FLUID REDO: CPT | Performed by: OBSTETRICS & GYNECOLOGY

## 2022-02-14 NOTE — PROGRESS NOTES
SUBJECTIVE:     Chief Complaint: Well Woman       History of Present Illness:  Annual Exam  Patient presents for annual exam.   She c/o nothing today.  LMP: none with nexplanon  She denies any vd, vb, dyspareunia, dysuria, depression, anxiety.  Last pap was in  and was abnl, colpo LORENA 1, due for cotest.  Birth Control: nexplanon 2020    GYN screening history: see above  Mammogram history: neg 2021  Colonoscopy history: na  Dexa history: na    FH:   Breast cancer: paternal GM  Colon cancer: paternal GM  Ovarian cancer: none    Review of patient's allergies indicates:   Allergen Reactions    Percocet [oxycodone-acetaminophen]        Past Medical History:   Diagnosis Date    Abnormal Pap smear of cervix     Depression     Head ache     Hives     Kidney stones     Urticaria      Past Surgical History:   Procedure Laterality Date    KIDNEY STONE SURGERY      WISDOM TOOTH EXTRACTION       OB History        1    Para   1    Term   1            AB        Living   1       SAB        IAB        Ectopic        Multiple   0    Live Births   1               Family History   Problem Relation Age of Onset    Hyperlipidemia Mother     Hyperlipidemia Father     Breast cancer Paternal Grandmother         Dx > 50 years    Colon cancer Paternal Grandmother     Ovarian cancer Neg Hx     Cancer Neg Hx      Social History     Tobacco Use    Smoking status: Never Smoker    Smokeless tobacco: Never Used   Substance Use Topics    Alcohol use: Not Currently     Comment: Rare    Drug use: No       Current Outpatient Medications   Medication Sig    erenumab-aooe (AIMOVIG AUTOINJECTOR) 140 mg/mL autoinjector Inject 1 mL (140 mg total) into the skin every 28 days.    sertraline (ZOLOFT) 100 MG tablet Take 1 tablet (100 mg total) by mouth once daily.    sumatriptan (IMITREX) 100 MG tablet Take 1 tab at onset of migraine, can repeat in 2 hrs if needed. Max 2 tabs/day. Max 3 days/week.    ubrogepant  (UBRELVY) 100 mg tablet Take 1 tablet (100 mg total) by mouth every 2 (two) hours as needed for Migraine. Max 200 mg/day.    hydrOXYzine HCL (ATARAX) 10 MG Tab      Current Facility-Administered Medications   Medication    etonogestreL subdermal device 68 mg       Review of Systems:  GENERAL: No fever, chills, fatigability or weight loss.  CARDIOVASCULAR: No chest pain. No palpitations.  RESPIRATORY: No SOB, no wheezing.  BREAST: Denies pain. No lumps. No discharge.  VULVAR: No pain, no lesions and no itching.  VAGINAL: No relaxation, no itching, no discharge, no abnormal bleeding and no lesions.  ABDOMEN: No abdominal pain. Denies nausea. Denies vomiting. No diarrhea. No constipation  URINARY: No incontinence, no nocturia, no frequency and no dysuria.  NEUROLOGICAL: No headaches. No vision changes.       OBJECTIVE:     Vitals:    02/14/22 0847   BP: 122/76       Patient verbally consents to pelvic and breast exams.  Physical Exam:  Gen: NAD, well developed, well-nourished  HEENT: Normocephalic, atraumatic  Eyes: EOM nl, conjuntivae normal  Neck: ROM normal, no thyromegaly  Respiratory: Effort normal   Abd: soft, nontender, no masses palpated  Breast: Normal bilaterally, no masses, lesions or tenderness. No nipple discharge on expression, no lymphadenopathy bilaterally.  SSE:  Vulva: no lesions or rashes  Cervix: No lesions noted, nonfriable, no vaginal discharge or vaginal bleeding noted  BME:   Cervix: No CMT  Adnexa: nl bilaterally, no masses or fullness palpated  Uterus: normal, nonenlarged  Musculoskeletal: normal ROM  Neuro: alert, AAOx3  Skin: warm and dry  Psych: mood/affect nl, behavior normal, judgement normal, thought content normal        ASSESSMENT:       ICD-10-CM ICD-9-CM    1. Encounter for annual routine gynecological examination  Z01.419 V72.31 Liquid-Based Pap Smear, Screening      HPV High Risk Genotypes, PCR   2. Breast cancer screening by mammogram  Z12.31 V76.12 Mammo Digital Screening Bilat    3. History of abnormal cervical Pap smear  Z87.42 V13.29 Liquid-Based Pap Smear, Screening      HPV High Risk Genotypes, PCR          Plan:       was seen today for well woman.    Diagnoses and all orders for this visit:    Encounter for annual routine gynecological examination  -     Liquid-Based Pap Smear, Screening  -     HPV High Risk Genotypes, PCR    Breast cancer screening by mammogram  -     Mammo Digital Screening Bilat; Future    History of abnormal cervical Pap smear  -     Liquid-Based Pap Smear, Screening  -     HPV High Risk Genotypes, PCR        Orders Placed This Encounter   Procedures    HPV High Risk Genotypes, PCR    Mammo Digital Screening Bilat       Follow up in one year for annual, or prn.    Julie R Jeansonne

## 2022-02-21 LAB
CLINICAL INFO: NORMAL
CYTO CVX: NORMAL
CYTOLOGIST CVX/VAG CYTO: NORMAL
CYTOLOGIST CVX/VAG CYTO: NORMAL
CYTOLOGY CMNT CVX/VAG CYTO-IMP: NORMAL
CYTOLOGY PAP THIN PREP EXPLANATION: NORMAL
DATE OF PREVIOUS PAP: NO
DATE PREVIOUS BX: YES
GEN CATEG CVX/VAG CYTO-IMP: NORMAL
HPV I/H RISK 4 DNA CVX QL NAA+PROBE: DETECTED
HPV16 DNA CVX QL PROBE+SIG AMP: DETECTED
HPV18 DNA CVX QL PROBE+SIG AMP: NOT DETECTED
LMP START DATE: NORMAL
MICROORGANISM CVX/VAG CYTO: NORMAL
PATHOLOGIST CVX/VAG CYTO: NORMAL
SERVICE CMNT-IMP: NORMAL
SPECIMEN SOURCE CVX/VAG CYTO: NORMAL
STAT OF ADQ CVX/VAG CYTO-IMP: NORMAL

## 2022-02-22 ENCOUNTER — TELEPHONE (OUTPATIENT)
Dept: OBSTETRICS AND GYNECOLOGY | Facility: CLINIC | Age: 42
End: 2022-02-22
Payer: COMMERCIAL

## 2022-02-22 NOTE — TELEPHONE ENCOUNTER
----- Message from Julie R. Jeansonne, MD sent at 2/21/2022  4:53 PM CST -----  Regarding: colpo  Please contact to schedule colpo. I messaged her also. TY.

## 2022-02-24 ENCOUNTER — PATIENT MESSAGE (OUTPATIENT)
Dept: NEUROLOGY | Facility: CLINIC | Age: 42
End: 2022-02-24
Payer: COMMERCIAL

## 2022-03-07 ENCOUNTER — OFFICE VISIT (OUTPATIENT)
Dept: INTERNAL MEDICINE | Facility: CLINIC | Age: 42
End: 2022-03-07
Payer: COMMERCIAL

## 2022-03-07 ENCOUNTER — LAB VISIT (OUTPATIENT)
Dept: LAB | Facility: HOSPITAL | Age: 42
End: 2022-03-07
Attending: INTERNAL MEDICINE
Payer: COMMERCIAL

## 2022-03-07 VITALS
HEIGHT: 59 IN | SYSTOLIC BLOOD PRESSURE: 108 MMHG | BODY MASS INDEX: 35.87 KG/M2 | WEIGHT: 177.94 LBS | DIASTOLIC BLOOD PRESSURE: 84 MMHG | HEART RATE: 88 BPM | OXYGEN SATURATION: 98 %

## 2022-03-07 DIAGNOSIS — R10.9 ABDOMINAL PAIN, UNSPECIFIED ABDOMINAL LOCATION: ICD-10-CM

## 2022-03-07 DIAGNOSIS — Z00.00 ROUTINE PHYSICAL EXAMINATION: ICD-10-CM

## 2022-03-07 DIAGNOSIS — Z00.00 ROUTINE PHYSICAL EXAMINATION: Primary | ICD-10-CM

## 2022-03-07 DIAGNOSIS — G43.009 MIGRAINE WITHOUT AURA AND WITHOUT STATUS MIGRAINOSUS, NOT INTRACTABLE: ICD-10-CM

## 2022-03-07 DIAGNOSIS — F32.A DEPRESSION, UNSPECIFIED DEPRESSION TYPE: ICD-10-CM

## 2022-03-07 LAB
ALBUMIN SERPL BCP-MCNC: 3.9 G/DL (ref 3.5–5.2)
ALP SERPL-CCNC: 100 U/L (ref 55–135)
ALT SERPL W/O P-5'-P-CCNC: 27 U/L (ref 10–44)
ANION GAP SERPL CALC-SCNC: 11 MMOL/L (ref 8–16)
AST SERPL-CCNC: 20 U/L (ref 10–40)
BASOPHILS # BLD AUTO: 0.03 K/UL (ref 0–0.2)
BASOPHILS NFR BLD: 0.5 % (ref 0–1.9)
BILIRUB SERPL-MCNC: 0.8 MG/DL (ref 0.1–1)
BILIRUB UR QL STRIP: NEGATIVE
BUN SERPL-MCNC: 14 MG/DL (ref 6–20)
CALCIUM SERPL-MCNC: 9.5 MG/DL (ref 8.7–10.5)
CHLORIDE SERPL-SCNC: 103 MMOL/L (ref 95–110)
CHOLEST SERPL-MCNC: 182 MG/DL (ref 120–199)
CHOLEST/HDLC SERPL: 3.4 {RATIO} (ref 2–5)
CLARITY UR REFRACT.AUTO: ABNORMAL
CO2 SERPL-SCNC: 24 MMOL/L (ref 23–29)
COLOR UR AUTO: YELLOW
CREAT SERPL-MCNC: 0.8 MG/DL (ref 0.5–1.4)
DIFFERENTIAL METHOD: NORMAL
EOSINOPHIL # BLD AUTO: 0.1 K/UL (ref 0–0.5)
EOSINOPHIL NFR BLD: 1.8 % (ref 0–8)
ERYTHROCYTE [DISTWIDTH] IN BLOOD BY AUTOMATED COUNT: 12.7 % (ref 11.5–14.5)
EST. GFR  (AFRICAN AMERICAN): >60 ML/MIN/1.73 M^2
EST. GFR  (NON AFRICAN AMERICAN): >60 ML/MIN/1.73 M^2
ESTIMATED AVG GLUCOSE: 111 MG/DL (ref 68–131)
GLUCOSE SERPL-MCNC: 88 MG/DL (ref 70–110)
GLUCOSE UR QL STRIP: NEGATIVE
HBA1C MFR BLD: 5.5 % (ref 4–5.6)
HCT VFR BLD AUTO: 45 % (ref 37–48.5)
HDLC SERPL-MCNC: 53 MG/DL (ref 40–75)
HDLC SERPL: 29.1 % (ref 20–50)
HGB BLD-MCNC: 14.7 G/DL (ref 12–16)
HGB UR QL STRIP: NEGATIVE
IMM GRANULOCYTES # BLD AUTO: 0.02 K/UL (ref 0–0.04)
IMM GRANULOCYTES NFR BLD AUTO: 0.3 % (ref 0–0.5)
KETONES UR QL STRIP: NEGATIVE
LDLC SERPL CALC-MCNC: 96 MG/DL (ref 63–159)
LEUKOCYTE ESTERASE UR QL STRIP: NEGATIVE
LYMPHOCYTES # BLD AUTO: 1.6 K/UL (ref 1–4.8)
LYMPHOCYTES NFR BLD: 27.3 % (ref 18–48)
MCH RBC QN AUTO: 30.8 PG (ref 27–31)
MCHC RBC AUTO-ENTMCNC: 32.7 G/DL (ref 32–36)
MCV RBC AUTO: 94 FL (ref 82–98)
MONOCYTES # BLD AUTO: 0.5 K/UL (ref 0.3–1)
MONOCYTES NFR BLD: 7.9 % (ref 4–15)
NEUTROPHILS # BLD AUTO: 3.7 K/UL (ref 1.8–7.7)
NEUTROPHILS NFR BLD: 62.2 % (ref 38–73)
NITRITE UR QL STRIP: NEGATIVE
NONHDLC SERPL-MCNC: 129 MG/DL
NRBC BLD-RTO: 0 /100 WBC
PH UR STRIP: 6 [PH] (ref 5–8)
PLATELET # BLD AUTO: 305 K/UL (ref 150–450)
PMV BLD AUTO: 9.7 FL (ref 9.2–12.9)
POTASSIUM SERPL-SCNC: 3.9 MMOL/L (ref 3.5–5.1)
PROT SERPL-MCNC: 7.3 G/DL (ref 6–8.4)
PROT UR QL STRIP: NEGATIVE
RBC # BLD AUTO: 4.78 M/UL (ref 4–5.4)
SODIUM SERPL-SCNC: 138 MMOL/L (ref 136–145)
SP GR UR STRIP: 1.02 (ref 1–1.03)
TRIGL SERPL-MCNC: 165 MG/DL (ref 30–150)
URN SPEC COLLECT METH UR: ABNORMAL
WBC # BLD AUTO: 5.98 K/UL (ref 3.9–12.7)

## 2022-03-07 PROCEDURE — 83036 HEMOGLOBIN GLYCOSYLATED A1C: CPT | Performed by: INTERNAL MEDICINE

## 2022-03-07 PROCEDURE — 3079F DIAST BP 80-89 MM HG: CPT | Mod: CPTII,S$GLB,, | Performed by: INTERNAL MEDICINE

## 2022-03-07 PROCEDURE — 80053 COMPREHEN METABOLIC PANEL: CPT | Performed by: INTERNAL MEDICINE

## 2022-03-07 PROCEDURE — 1159F MED LIST DOCD IN RCRD: CPT | Mod: CPTII,S$GLB,, | Performed by: INTERNAL MEDICINE

## 2022-03-07 PROCEDURE — 3008F BODY MASS INDEX DOCD: CPT | Mod: CPTII,S$GLB,, | Performed by: INTERNAL MEDICINE

## 2022-03-07 PROCEDURE — 3008F PR BODY MASS INDEX (BMI) DOCUMENTED: ICD-10-PCS | Mod: CPTII,S$GLB,, | Performed by: INTERNAL MEDICINE

## 2022-03-07 PROCEDURE — 80061 LIPID PANEL: CPT | Performed by: INTERNAL MEDICINE

## 2022-03-07 PROCEDURE — 99396 PREV VISIT EST AGE 40-64: CPT | Mod: S$GLB,,, | Performed by: INTERNAL MEDICINE

## 2022-03-07 PROCEDURE — 99999 PR PBB SHADOW E&M-EST. PATIENT-LVL IV: ICD-10-PCS | Mod: PBBFAC,,, | Performed by: INTERNAL MEDICINE

## 2022-03-07 PROCEDURE — 3074F SYST BP LT 130 MM HG: CPT | Mod: CPTII,S$GLB,, | Performed by: INTERNAL MEDICINE

## 2022-03-07 PROCEDURE — 99999 PR PBB SHADOW E&M-EST. PATIENT-LVL IV: CPT | Mod: PBBFAC,,, | Performed by: INTERNAL MEDICINE

## 2022-03-07 PROCEDURE — 1160F PR REVIEW ALL MEDS BY PRESCRIBER/CLIN PHARMACIST DOCUMENTED: ICD-10-PCS | Mod: CPTII,S$GLB,, | Performed by: INTERNAL MEDICINE

## 2022-03-07 PROCEDURE — 3079F PR MOST RECENT DIASTOLIC BLOOD PRESSURE 80-89 MM HG: ICD-10-PCS | Mod: CPTII,S$GLB,, | Performed by: INTERNAL MEDICINE

## 2022-03-07 PROCEDURE — 81003 URINALYSIS AUTO W/O SCOPE: CPT | Performed by: INTERNAL MEDICINE

## 2022-03-07 PROCEDURE — 36415 COLL VENOUS BLD VENIPUNCTURE: CPT | Performed by: INTERNAL MEDICINE

## 2022-03-07 PROCEDURE — 85025 COMPLETE CBC W/AUTO DIFF WBC: CPT | Performed by: INTERNAL MEDICINE

## 2022-03-07 PROCEDURE — 3074F PR MOST RECENT SYSTOLIC BLOOD PRESSURE < 130 MM HG: ICD-10-PCS | Mod: CPTII,S$GLB,, | Performed by: INTERNAL MEDICINE

## 2022-03-07 PROCEDURE — 1160F RVW MEDS BY RX/DR IN RCRD: CPT | Mod: CPTII,S$GLB,, | Performed by: INTERNAL MEDICINE

## 2022-03-07 PROCEDURE — 1159F PR MEDICATION LIST DOCUMENTED IN MEDICAL RECORD: ICD-10-PCS | Mod: CPTII,S$GLB,, | Performed by: INTERNAL MEDICINE

## 2022-03-07 PROCEDURE — 99396 PR PREVENTIVE VISIT,EST,40-64: ICD-10-PCS | Mod: S$GLB,,, | Performed by: INTERNAL MEDICINE

## 2022-03-07 NOTE — PROGRESS NOTES
Subjective:       Patient ID: Kelly Thomason is a 41 y.o. female.    Chief Complaint: Annual Exam    HPI:  Patient comes in for annual exam.  History kidney stones but no problems or attacks in 7 or 8 years.  She has been having some left-sided mid back to flank pain.  No blood in the urine.  Denies any possibility of being pregnant.  Feels different from the musculoskeletal spine pain that we did MRI testing for 2 years ago  She would like to get some blood work done.  No change in medication.  She admits she does not drink enough water.  It is an intermittent dull ache.  Not necessarily worse with movement.    Review of Systems   Constitutional: Negative for activity change and unexpected weight change.   HENT: Negative for hearing loss, rhinorrhea and trouble swallowing.    Eyes: Negative for discharge and visual disturbance.   Respiratory: Negative for chest tightness and wheezing.    Cardiovascular: Negative for chest pain and palpitations.   Gastrointestinal: Negative for blood in stool, constipation, diarrhea and vomiting.   Endocrine: Negative for polydipsia and polyuria.   Genitourinary: Positive for flank pain (Left side). Negative for difficulty urinating, dysuria, hematuria and menstrual problem.   Musculoskeletal: Negative for arthralgias, joint swelling and neck pain.   Neurological: Positive for headaches. Negative for weakness.   Psychiatric/Behavioral: Negative for confusion and dysphoric mood.           Past Medical History:   Diagnosis Date    Abnormal Pap smear of cervix     Depression     Head ache     Hives     Kidney stones     Urticaria      Past Surgical History:   Procedure Laterality Date    KIDNEY STONE SURGERY      WISDOM TOOTH EXTRACTION        Patient Active Problem List   Diagnosis    Depression    Migraine without aura and without status migrainosus, not intractable    Neck pain    Poor posture    Decreased ROM of intervertebral discs of cervical spine        Objective:       Physical Exam  Constitutional:       General: She is not in acute distress.     Appearance: She is well-developed.   HENT:      Head: Normocephalic and atraumatic.      Right Ear: Tympanic membrane, ear canal and external ear normal.      Left Ear: Tympanic membrane, ear canal and external ear normal.      Mouth/Throat:      Pharynx: No oropharyngeal exudate or posterior oropharyngeal erythema.   Eyes:      General: No scleral icterus.     Conjunctiva/sclera: Conjunctivae normal.      Pupils: Pupils are equal, round, and reactive to light.   Neck:      Thyroid: No thyromegaly.   Cardiovascular:      Rate and Rhythm: Normal rate and regular rhythm.      Pulses: Normal pulses.      Heart sounds: No murmur heard.  Pulmonary:      Effort: Pulmonary effort is normal.      Breath sounds: Normal breath sounds. No wheezing.   Abdominal:      General: Bowel sounds are normal. There is no distension.      Palpations: Abdomen is soft.      Tenderness: There is no abdominal tenderness.   Musculoskeletal:         General: Tenderness (Mild tenderness just below the left low ribs near the CVA region I am not certain if this may not also be musculoskeletal.) present.      Cervical back: Normal range of motion and neck supple.      Right lower leg: No edema.      Left lower leg: No edema.   Lymphadenopathy:      Cervical: No cervical adenopathy.   Skin:     Coloration: Skin is not jaundiced.      Findings: No rash.   Neurological:      General: No focal deficit present.      Mental Status: She is alert and oriented to person, place, and time.   Psychiatric:         Mood and Affect: Mood normal.         Behavior: Behavior normal.         Assessment:       Problem List Items Addressed This Visit        Neuro    Migraine without aura and without status migrainosus, not intractable       Psychiatric    Depression      Other Visit Diagnoses     Routine physical examination    -  Primary    Relevant Orders    Lipid Panel     Hemoglobin A1C    Abdominal pain, unspecified abdominal location        Relevant Orders    Urinalysis, Reflex to Urine Culture Urine, Clean Catch    Comprehensive Metabolic Panel    CBC Auto Differential    CT Abdomen Pelvis  Without Contrast          Plan:          was seen today for annual exam.    Diagnoses and all orders for this visit:    Routine physical examination  -     Lipid Panel; Future  -     Hemoglobin A1C; Future    Abdominal pain, unspecified abdominal location  -     Urinalysis, Reflex to Urine Culture Urine, Clean Catch  -     Comprehensive Metabolic Panel; Future  -     CBC Auto Differential; Future  -     CT Abdomen Pelvis  Without Contrast; Future    Migraine without aura and without status migrainosus, not intractable    Depression, unspecified depression type             Review all studies.  Monitor for blood in the urine other changes.  Follow-up depending on results and annually.  Increase water and reduce tea

## 2022-03-08 ENCOUNTER — PATIENT MESSAGE (OUTPATIENT)
Dept: INTERNAL MEDICINE | Facility: CLINIC | Age: 42
End: 2022-03-08
Payer: COMMERCIAL

## 2022-03-08 ENCOUNTER — PROCEDURE VISIT (OUTPATIENT)
Dept: OBSTETRICS AND GYNECOLOGY | Facility: CLINIC | Age: 42
End: 2022-03-08
Payer: COMMERCIAL

## 2022-03-08 VITALS
DIASTOLIC BLOOD PRESSURE: 86 MMHG | HEIGHT: 59 IN | SYSTOLIC BLOOD PRESSURE: 128 MMHG | BODY MASS INDEX: 36.05 KG/M2 | WEIGHT: 178.81 LBS

## 2022-03-08 DIAGNOSIS — Z32.02 NEGATIVE PREGNANCY TEST: Primary | ICD-10-CM

## 2022-03-08 DIAGNOSIS — B97.7 HPV IN FEMALE: ICD-10-CM

## 2022-03-08 LAB
B-HCG UR QL: NEGATIVE
CTP QC/QA: YES

## 2022-03-08 PROCEDURE — 57454 BX/CURETT OF CERVIX W/SCOPE: CPT | Mod: S$GLB,,, | Performed by: OBSTETRICS & GYNECOLOGY

## 2022-03-08 PROCEDURE — 88305 TISSUE EXAM BY PATHOLOGIST: ICD-10-PCS | Mod: 26,,, | Performed by: PATHOLOGY

## 2022-03-08 PROCEDURE — 88305 TISSUE EXAM BY PATHOLOGIST: CPT | Performed by: PATHOLOGY

## 2022-03-08 PROCEDURE — 57454 COLPOSCOPY: ICD-10-PCS | Mod: S$GLB,,, | Performed by: OBSTETRICS & GYNECOLOGY

## 2022-03-08 PROCEDURE — 81025 POCT URINE PREGNANCY: ICD-10-PCS | Mod: S$GLB,,, | Performed by: OBSTETRICS & GYNECOLOGY

## 2022-03-08 PROCEDURE — 81025 URINE PREGNANCY TEST: CPT | Mod: S$GLB,,, | Performed by: OBSTETRICS & GYNECOLOGY

## 2022-03-08 PROCEDURE — 88305 TISSUE EXAM BY PATHOLOGIST: CPT | Mod: 26,,, | Performed by: PATHOLOGY

## 2022-03-08 NOTE — PROCEDURES
Colposcopy    Date/Time: 3/8/2022 2:15 PM  Performed by: Julie R. Jeansonne, MD  Authorized by: Julie R. Jeansonne, MD     Consent Done?:  Yes (Written)  Timeout:Immediately prior to procedure a time out was called to verify the correct patient, procedure, equipment, support staff and site/side marked as required  Prep:Patient was prepped and draped in the usual sterile fashion  Assistants?: Yes    List of assistants:  Seth Angel Lisa I was present for the entire procedure.    Colposcopy Site:  Cervix  Position:  Supine   Patient was prepped and draped in the normal sterile fashion.  Acrowhite Lesion: No    Atypical Vessels: No    Transformation Zone Adequate?: Yes    Biopsy?: Yes         Location:  Cervix ((2 00 and 12 00))  ECC Performed?: Yes    LEEP Performed?: No    Estimated blood loss (cc):  1   Patient tolerated the procedure well with no immediate complications.   Post-operative instructions were provided for the patient.   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

## 2022-03-14 LAB
FINAL PATHOLOGIC DIAGNOSIS: NORMAL
GROSS: NORMAL
Lab: NORMAL

## 2022-03-22 ENCOUNTER — HOSPITAL ENCOUNTER (OUTPATIENT)
Dept: RADIOLOGY | Facility: HOSPITAL | Age: 42
Discharge: HOME | End: 2022-03-22
Attending: INTERNAL MEDICINE
Payer: COMMERCIAL

## 2022-03-22 DIAGNOSIS — R10.9 ABDOMINAL PAIN, UNSPECIFIED ABDOMINAL LOCATION: ICD-10-CM

## 2022-03-22 PROCEDURE — 74176 CT ABD & PELVIS W/O CONTRAST: CPT | Mod: TC

## 2022-03-22 PROCEDURE — 74176 CT RENAL STONE STUDY ABD PELVIS WO: ICD-10-PCS | Mod: 26,,, | Performed by: RADIOLOGY

## 2022-03-22 PROCEDURE — 74176 CT ABD & PELVIS W/O CONTRAST: CPT | Mod: 26,,, | Performed by: RADIOLOGY

## 2022-03-28 ENCOUNTER — PATIENT MESSAGE (OUTPATIENT)
Dept: INTERNAL MEDICINE | Facility: CLINIC | Age: 42
End: 2022-03-28
Payer: COMMERCIAL

## 2022-05-06 ENCOUNTER — OFFICE VISIT (OUTPATIENT)
Dept: NEUROLOGY | Facility: CLINIC | Age: 42
End: 2022-05-06
Payer: COMMERCIAL

## 2022-05-06 DIAGNOSIS — G43.009 MIGRAINE WITHOUT AURA AND WITHOUT STATUS MIGRAINOSUS, NOT INTRACTABLE: Primary | ICD-10-CM

## 2022-05-06 PROCEDURE — 1159F MED LIST DOCD IN RCRD: CPT | Mod: CPTII,95,, | Performed by: NURSE PRACTITIONER

## 2022-05-06 PROCEDURE — 3044F HG A1C LEVEL LT 7.0%: CPT | Mod: CPTII,95,, | Performed by: NURSE PRACTITIONER

## 2022-05-06 PROCEDURE — 99214 PR OFFICE/OUTPT VISIT, EST, LEVL IV, 30-39 MIN: ICD-10-PCS | Mod: 95,,, | Performed by: NURSE PRACTITIONER

## 2022-05-06 PROCEDURE — 99214 OFFICE O/P EST MOD 30 MIN: CPT | Mod: 95,,, | Performed by: NURSE PRACTITIONER

## 2022-05-06 PROCEDURE — 1160F RVW MEDS BY RX/DR IN RCRD: CPT | Mod: CPTII,95,, | Performed by: NURSE PRACTITIONER

## 2022-05-06 PROCEDURE — 1159F PR MEDICATION LIST DOCUMENTED IN MEDICAL RECORD: ICD-10-PCS | Mod: CPTII,95,, | Performed by: NURSE PRACTITIONER

## 2022-05-06 PROCEDURE — 3044F PR MOST RECENT HEMOGLOBIN A1C LEVEL <7.0%: ICD-10-PCS | Mod: CPTII,95,, | Performed by: NURSE PRACTITIONER

## 2022-05-06 PROCEDURE — 1160F PR REVIEW ALL MEDS BY PRESCRIBER/CLIN PHARMACIST DOCUMENTED: ICD-10-PCS | Mod: CPTII,95,, | Performed by: NURSE PRACTITIONER

## 2022-05-06 NOTE — PROGRESS NOTES
Established Patient   SUBJECTIVE:  Patient ID: Kelly Thomason   Chief Complaint: Follow-up    History of Present Illness:  Kelly Thomason is a 41 y.o. female who presents for follow-up of headaches via virtual visit.     The patient location is: in [place]   The chief complaint leading to consultation is: Follow-up  Visit type: Virtual visit with synchronous audio and video  Total time spent with patient: 10 minutes   Each patient to whom he or she provides medical services by telemedicine is:  (1) informed of the relationship between the physician and patient and the respective role of any other health care provider with respect to management of the patient; and (2) notified that he or she may decline to receive medical services by telemedicine and may withdraw from such care at any time.    Recommendations made at last Office Visit on 2/4/22:  - For migraine prevention - continue aimovig 140 mg SQ monthly   - For acute migraines - sumatriptan 100 mg tabs + naproxen   - Given ubrelvy 100 mg tabs to have on hand if sumatriptan is ineffective   - Anxiety - stable on sertraline 100 mg tabs, management per PCP   - Continue tracking headaches   - RTC in 3-6 months or sooner if needed     05/06/2022 - Interval History:  Migraines continue to be slightly more frequent than she would like over the last few months.  She typically experiences 1-2 mild migraines per week.  Lasting only hours not all day in duration.  She has continued using Aimovig 140 mg SQ monthly for migraine prevention, which she does feel is helping in that her migraines are no where near as bad as they were in the past.  She feels the benefits of Aimovig are gradually diminishing, would like to consider alternative treatment options.     Otherwise, information below is still accurate and current.     02/04/2022 - Interval History:  Generally speaking migraines have remained well controlled over the last 6 months.  Did experience a slight uptick in  migraine frequency in December, sent message via patient portal stating sumatriptan/naproxen had not been working, was given new prescription for ubrelvy 100 mg tabs, unfortunately it took sometime for insurance to approve ubrelvy, by the time she picked up prescription from pharmacy her migraine had subsided.  She has not tried ubrelvy yet as for all subsequent migraines sumatriptan/naproxen has been effective.  Migraines returned to baseline frequency in January. Reports she is currently suffering with 3-4 headaches per month, migraines she does experience are not debilitating, she does not consider migraines to be severe.  More often than not sumatriptan 100 mg + naproxen is effective in aborting her migraines.       Otherwise, information below is still accurate and current.      08/06/2021 - Interval History:  She has been suffering with a cold, sinus congestion, coughing for the last 3 weeks, which has subsequently triggered headaches and pressure.   Outside of the last few weeks, migraines have been well controlled.  She has continued using Aimovig 140 mg SQ monthly for migraine prevention which has been helping.  Treats acute migraines with sumatriptan 100 mg tabs.  She is pleased with the current state of her migraines, does not wish to make adjustments to her treatment plan at this time.      Otherwise, information below is still accurate and current.      06/25/2021 - Interval History:  Aimovig was increased to 140 mg dose on 5/7/21 due to increased frequency of migraines.  She does feel increasing her dose of Aimovig has helped some, however the increased heat seems to trigger more frequent migraines.  She has been focusing on increasing her hydration.  Currently suffering with a migraine once every 1-2 weeks.  Feels migraines triggered by heat are more difficult to treat.  Typically 3-4 migraines per month.  Treats acute migraines with sumatriptan 100 mg tabs, which helps, however can have migraines  "persist for more than 1 day in duration.     Otherwise, information below is still accurate and current.      02/12/2021 - Interval History:  Migraines continue to be well controlled on Aimovig 70 mg SQ monthly, she has begun to notice the week she is due for her injection she begins to get mild headaches, no full blown migraines which she is very pleased about!  Despite experiencing mild headaches in the days leading up to her next dose of aimovig, she does not wish to make any adjustments to her treatment plan at this time.       Otherwise, information below is still accurate and current.      12/02/2019 - Interval History:  Migraines are "great", has only been experiencing 1-2 migraines per month, most of which are resolved by excedrin, occasionally has to take sumatriptan.  She picked up a 3 month supply of sumatriptan last January and still has 10 pills left!  She has been using Aimovig 70 mg SQ monthly, which has been very effective. She does experiencing itching to the injection site, which is relieved by ice.  She is very happy with the current state of her migraines and does not wish to make any further adjustments to her treatment plan at this time.       Otherwise, information below is still accurate and current.      01/03/2019 - Interval History:  Headaches and migraines have begun to occur more frequently, despite Botox injections, she no longer feels Botox is helping her migraines and wants to discuss changing her migraine prevention.  She has tried multiple oral medications as well as Botox without significant relief.  She is interested in trying a CGRP inhibitor for migraine prevention as well as discontinuing Botox injections. Migraines continue to feel the same as they always have, she denies any change in the quality or nature of her migraines.       11/13/2018- Interval History:   presents to clinic for third round of Botox, after her second round of Botox she noticed a decrease in the " "frequency of her migraines, admits she has been under increased stress over the last few weeks and migraines have not been significantly more frequent, which they would have been prior to beginning Botox injections.  Prior to beginning Botox, she was suffering with 1-2 debilitating migraines per week, over the last twelve weeks she feels she has only had 1-2 debilitating migraines and "a couple" moderate to severe migraines.  Sumatriptan continues to give her good relief, on only one occasion sumatriptan has not been effective following the first dose.  She has questions regarding alternative treatment options including Aimovig, Frova and Cambia.  Discussed with regards to Aimovig, I would prefer to have her continue with Botox injections, as they have been quite effective in minimizing the burden of her migraines, for at least 3 rounds.  If after round 3 she would like to stop Botox and consider alternative options I would be agreeable to this plan.  I would not recommend her use Frova for migraine abortive as the onset of action is quite slow and her migraines intensify to peak fairly quickly.  Cambia could be used to supplement Sumatriptan in place of Naprosyn, however after learning Cambia is similar to Naprosyn, she feels more comfortable with continuing with use of Naprosyn.       08/27/2018- Interval History:  She does feel migraine frequency has decreased after first dose of Botox, also feels migraines are more responsive to medication.  She denies any presence of side effects from Botox injections and wishes to continue with treatment at this time.      06/04/2018- Interval History:  Zomig did not give her any relief, Sumatriptan gives her more relief.  Prednisone did help slightly to break up headache cycle.  She has only been taking Sumatriptan 1-2 times per week.  She continues to state she does not wish to add daily medication to prevention.  Headaches have not changed in nature or quality.  Risks, " Benefits, and potential side effects of Botox discussed with patient.  Alternative treatments offered.  After thorough discussed regarding the procedure, patient has decided to move forward with initiating Botox injections for Chronic Migraine.  Patient understands we will complete 3 rounds of injections, if after 3 rounds, we do not see a 50% reduction in headaches, we will discontinue Botox injections.      04/23/2018 - Interval History:  At last appointment with Dr. Holm in January 2017, it was suggested  take 1/2 tab Sumatriptan 100 mg with 1 1/2 tabs aleve for migraine abortive.  For a while her migraines had been great, states she only rarely expereinced any migraines during pregnancy as well as while she was breast feeding.  She stopped breast feeding last summer and migraines have since gradually become more frequent.  She reports having a headache nearly everyday with full blown migraines occurring 1-2 times per week, each lasting a full day.  She has tried using sumatriptan with aleve, however she does not find this effective.  She has suffered from migraines since she was 14 years old, she denies any change in the quality or nature of her headaches, this is how they have always been, just becoming more frequent.  Migraine is described as a pounding pain typically located behind her eye (can be either right or left sided) and temple, radiating into the neck.  Denies presence of aura symptoms.  Migraines are associated with nausea, photophobia, phonophobia.  Worsened with movement/activity.  Rates pain from 6-7 out of 10.  Additionally, she has noticed when she gets a migraine while on her menstrual cycle, the intensity is much worse, headache is harder to break, and duration is significantly longer, these headaches come the week before her menstrual cycle.  She is currently on Zoloft for prevention, but has tried numerous preventive and abortive options in the past (listed below).    Additionally,  she has secondary complaints of intermittent lateral double vision which only occurs when she is either driving or watching tv.  She has noticed it typically only occurs after she has been watching tv for a while or has been driving for a good bit.  She believes the double vision resolves after she gets out of her car or walks away from the television but is unsure.  She has seen her optometrist who did not find any ocular cause for these symptoms.  She feels this has been going on for over a year.  When she gets double vision, if she closes on of her eyes these symptoms will resolve.      Notes from Dr. Holm:  History: Patient presents for follow-up for her migraine headaches.  Insurance will no longer cover Treximet, so she would like to have sumatriptan prescription and be able to add Aleve to it.  She typically takes half tablet of the Treximet.  We will give her the sumatriptan 100 mg which she can take half a tablet of in 1-1-1/2 Aleve with it.  Patient had been pregnant with no headaches for a while.  She has had increased frequency in the last 6 months with 2-3 headaches per month with good response to the Treximet.  She had tried that Cephaly  device but complains she doesn't have time to 2 minutes with a 2-year-old.  Previous note: 10-3-13: This is a 33-year-old white female who presents for followup for her migraine headaches. Patient was previously seen by Dr. Nicolle Chatman who is no longer here. Patient describes a 20 year history of migraine headaches. She describes right or left temporal pain radiating to the hand eye causing aching/deep pain in the eye radiating into the neck. Headache intensity is 5-6/10 associated with nausea, photophobia, sonophobia and lasting all day. The patient has no aura. She generally takes a quarter tablet of Treximet with relief in 30-60 minutes. She takes Cymbalta 30 mg and nortriptyline 25 mg h.s. for migraine prevention. Overall she has been doing quite well with  this medication regimen and is here predominantly to establish with a new neurologist.       Therapies Tried & Response:   Pamelor - unsure   Treximet - helped   Cefaly -   Sumatriptan -   Cymbalta - unsure  Zoloft -   Tramadol -   Excedrin -   Effexor - unsure   Topiramate - unsure, cannot try again hx of kidney stone  Zomig NS -   Zomig PO -   Lamictal -   Elavil - weight gain   Magnesium -   relpax -   Paxil - no   Midrin - ?  Darvocet -   Nadolol  Prednisone -    Baclofen -   Zomig PO -   Botox -helping   Aimovig - effective, losing efficacy    Ubrelvy -   emgality - given today     Current Medications:    galcanezumab-gnlm 120 mg/mL PnIj, Inject 120 mg into the skin every 28 days. Begin 28 days after loading dose., Disp: 1 mL, Rfl: 10    galcanezumab-gnlm 120 mg/mL PnIj, Inject 240 mg into the skin once. for 1 dose, Disp: 2 mL, Rfl: 0    hydrOXYzine HCL (ATARAX) 10 MG Tab, , Disp: , Rfl:     sertraline (ZOLOFT) 100 MG tablet, Take 1 tablet (100 mg total) by mouth once daily., Disp: 90 tablet, Rfl: 3    sumatriptan (IMITREX) 100 MG tablet, Take 1 tab at onset of migraine, can repeat in 2 hrs if needed. Max 2 tabs/day. Max 3 days/week., Disp: 27 tablet, Rfl: 3    ubrogepant (UBRELVY) 100 mg tablet, Take 1 tablet (100 mg total) by mouth every 2 (two) hours as needed for Migraine. Max 200 mg/day., Disp: 10 tablet, Rfl: 2    Current Facility-Administered Medications:     etonogestreL subdermal device 68 mg, 68 mg, , , Paola Tony NP, 68 mg at 05/21/20 1040    Review of Systems - A review of 10+ systems was conducted with pertinent positive and negative findings documented in HPI with all other systems reviewed and negative.    PFSH: Past medical, family, and social history reviewed as documented in chart with pertinent positive medical, family, and social history detailed in HPI.    OBJECTIVE:  Vitals: There were no vitals taken for this visit.     Physical Exam:  Constitutional: she appears  "well-developed and well-nourished. she is well groomed. NAD.     Review of Data:   Notes from internal medicine, OBGYN reviewed   Labs:  Procedure visit on 03/08/2022   Component Date Value Ref Range Status    POC Preg Test, Ur 03/08/2022 Negative  Negative Final     Acceptable 03/08/2022 Yes   Final    Final Pathologic Diagnosis 03/08/2022    Final                    Value:1. CERVICAL BIOPSIES SHOW SCATTERED FRAGMENTS SQUAMOUS EPITHELIUM AND  ENDOCERVICAL GLANDULAR EPITHELIUM WITH SQUAMOUS METAPLASIA.  NO DYSPLASIA  IDENTIFIED IN THIS LIMITED SAMPLE.  2. ENDOCERVICAL CURETTAGE SHOWS DETACHED FRAGMENTS OF SQUAMOUS EPITHELIUM  WITH FOCAL MILD DYSPLASIA (CIN1).      Gross 03/08/2022    Final                    Value:Patient ID:  [id number] Pathology ID:  [id number]  Received in 2 parts  Part 1  Received in formalin labeled "bx 12' 2'" are soft white-gray fragments of  tissue and mucus measuring 8 x 3 mm in aggregate.  Dyed with hematoxylin and  submitted entirely in cassette TON--1-A  Part 2  Received in formalin labeled "ECC" are a scant amount of white-gray fragments  of tissue.  Dyed with hematoxylin and submitted entirely in cassette  DVS--2-A  Grossed by Manas Stallings      Disclaimer 03/08/2022    Final                    Value:Unless the case is a 'gross only' or additional testing only, the final  diagnosis for each specimen is based on a microscopic examination of  appropriate tissue sections.     Lab Visit on 03/07/2022   Component Date Value Ref Range Status    Sodium 03/07/2022 138  136 - 145 mmol/L Final    Potassium 03/07/2022 3.9  3.5 - 5.1 mmol/L Final    Chloride 03/07/2022 103  95 - 110 mmol/L Final    CO2 03/07/2022 24  23 - 29 mmol/L Final    Glucose 03/07/2022 88  70 - 110 mg/dL Final    BUN 03/07/2022 14  6 - 20 mg/dL Final    Creatinine 03/07/2022 0.8  0.5 - 1.4 mg/dL Final    Calcium 03/07/2022 9.5  8.7 - 10.5 mg/dL Final    Total Protein 03/07/2022 7.3  6.0 - " 8.4 g/dL Final    Albumin 03/07/2022 3.9  3.5 - 5.2 g/dL Final    Total Bilirubin 03/07/2022 0.8  0.1 - 1.0 mg/dL Final    Alkaline Phosphatase 03/07/2022 100  55 - 135 U/L Final    AST 03/07/2022 20  10 - 40 U/L Final    ALT 03/07/2022 27  10 - 44 U/L Final    Anion Gap 03/07/2022 11  8 - 16 mmol/L Final    eGFR if African American 03/07/2022 >60.0  >60 mL/min/1.73 m^2 Final    eGFR if non African American 03/07/2022 >60.0  >60 mL/min/1.73 m^2 Final    WBC 03/07/2022 5.98  3.90 - 12.70 K/uL Final    RBC 03/07/2022 4.78  4.00 - 5.40 M/uL Final    Hemoglobin 03/07/2022 14.7  12.0 - 16.0 g/dL Final    Hematocrit 03/07/2022 45.0  37.0 - 48.5 % Final    MCV 03/07/2022 94  82 - 98 fL Final    MCH 03/07/2022 30.8  27.0 - 31.0 pg Final    MCHC 03/07/2022 32.7  32.0 - 36.0 g/dL Final    RDW 03/07/2022 12.7  11.5 - 14.5 % Final    Platelets 03/07/2022 305  150 - 450 K/uL Final    MPV 03/07/2022 9.7  9.2 - 12.9 fL Final    Immature Granulocytes 03/07/2022 0.3  0.0 - 0.5 % Final    Gran # (ANC) 03/07/2022 3.7  1.8 - 7.7 K/uL Final    Immature Grans (Abs) 03/07/2022 0.02  0.00 - 0.04 K/uL Final    Lymph # 03/07/2022 1.6  1.0 - 4.8 K/uL Final    Mono # 03/07/2022 0.5  0.3 - 1.0 K/uL Final    Eos # 03/07/2022 0.1  0.0 - 0.5 K/uL Final    Baso # 03/07/2022 0.03  0.00 - 0.20 K/uL Final    nRBC 03/07/2022 0  0 /100 WBC Final    Gran % 03/07/2022 62.2  38.0 - 73.0 % Final    Lymph % 03/07/2022 27.3  18.0 - 48.0 % Final    Mono % 03/07/2022 7.9  4.0 - 15.0 % Final    Eosinophil % 03/07/2022 1.8  0.0 - 8.0 % Final    Basophil % 03/07/2022 0.5  0.0 - 1.9 % Final    Differential Method 03/07/2022 Automated   Final    Cholesterol 03/07/2022 182  120 - 199 mg/dL Final    Triglycerides 03/07/2022 165 (A) 30 - 150 mg/dL Final    HDL 03/07/2022 53  40 - 75 mg/dL Final    LDL Cholesterol 03/07/2022 96.0  63.0 - 159.0 mg/dL Final    HDL/Cholesterol Ratio 03/07/2022 29.1  20.0 - 50.0 % Final    Total  Cholesterol/HDL Ratio 03/07/2022 3.4  2.0 - 5.0 Final    Non-HDL Cholesterol 03/07/2022 129  mg/dL Final    Hemoglobin A1C 03/07/2022 5.5  4.0 - 5.6 % Final    Estimated Avg Glucose 03/07/2022 111  68 - 131 mg/dL Final   Office Visit on 03/07/2022   Component Date Value Ref Range Status    Specimen UA 03/07/2022 Urine, Clean Catch   Final    Color, UA 03/07/2022 Yellow  Yellow, Straw, Belle Final    Appearance, UA 03/07/2022 Hazy (A) Clear Final    pH, UA 03/07/2022 6.0  5.0 - 8.0 Final    Specific [place], UA 03/07/2022 1.020  1.005 - 1.030 Final    Protein, UA 03/07/2022 Negative  Negative Final    Glucose, UA 03/07/2022 Negative  Negative Final    Ketones, UA 03/07/2022 Negative  Negative Final    Bilirubin (UA) 03/07/2022 Negative  Negative Final    Occult Blood UA 03/07/2022 Negative  Negative Final    Nitrite, UA 03/07/2022 Negative  Negative Final    Leukocytes, UA 03/07/2022 Negative  Negative Final   Office Visit on 02/14/2022   Component Date Value Ref Range Status    Cytology ThinPrep Pap Source 02/14/2022 Cervix   Final    Cytology ThinPrep Pap Report Status 02/14/2022 DNR   Final    Cytology Thinprep PAP Clinical His* 02/14/2022 Routine exam   Corrected    Cytology ThinPrep Pap LMP 02/14/2022 unknown   Final    Cytology ThinPrep Previous PAP 02/14/2022 No   Final    Cytology ThinPrep Previous Biopsy 02/14/2022 Yes   Final    Cytology ThinPrep PAP Adequacy 02/14/2022 SEE BELOW   Final    Cytology ThinPrep PAP General Gauri* 02/14/2022 DNR   Final    Cytology ThinPrep PAP Interpretati* 02/14/2022 SEE BELOW   Final    Cytology ThinPrep PAP Comment 02/14/2022 SEE BELOW   Final    Cytotechnologist 02/14/2022 SEE BELOW   Final    Review Cytotechnologist 02/14/2022 SEE BELOW   Final    Pathologist 02/14/2022 DNR   Final    Cytology ThinPrep PAP Infection 02/14/2022 DNR   Final    Cytology Thin Prep Pap Explanation 02/14/2022 SEE BELOW   Final    HPV DNA High Risk 02/14/2022  Detected (A) NOT DETECTED Final    HPV High Risk type 16, PCR 02/14/2022 Detected (A) Not Detected Final    HPV High Risk type 18, PCR 02/14/2022 Not Detected  Not Detected Final     Imaging:  No results found for this or any previous visit.  Note: I have independently reviewed any/all imaging/labs/tests and agree with the report (s) as documented.  Any discrepancies will be as noted/demarcated by free text.  ARMAAN URIBE 5/6/2022    ASSESSMENT:  1. Migraine without aura and without status migrainosus, not intractable      PLAN:  - D/C Aimovig   - Start Emgality 240 mg SQ loading dose followed by 120 mg SQ monthly injections.   - For acute migraines -  Sumatriptan 100 mg and ubrelvy 100 mg tabs   - Continue tracking headaches   - Continue sertraline 100 mg daily for anxiety/depression, likely with dual benefit on migraines   - RTC in 3 months or sooner if needed     Orders Placed This Encounter    galcanezumab-gnlm 120 mg/mL PnIj    galcanezumab-gnlm 120 mg/mL PnIj     Questions and concerns were sought and answered to the patient's stated verbal satisfaction.  The patient verbalizes understanding and agreement with the above stated treatment plan.     CC: MD Makayla Bhatt, FNP-C  [facility]   [phone]    Dr. Perez was available during today's encounter.

## 2022-05-09 ENCOUNTER — TELEPHONE (OUTPATIENT)
Dept: PHARMACY | Facility: CLINIC | Age: 42
End: 2022-05-09
Payer: COMMERCIAL

## 2022-05-16 ENCOUNTER — TELEPHONE (OUTPATIENT)
Dept: NEUROLOGY | Facility: CLINIC | Age: 42
End: 2022-05-16

## 2022-06-20 ENCOUNTER — PATIENT MESSAGE (OUTPATIENT)
Dept: OBSTETRICS AND GYNECOLOGY | Facility: CLINIC | Age: 42
End: 2022-06-20
Payer: COMMERCIAL

## 2022-08-10 ENCOUNTER — OFFICE VISIT (OUTPATIENT)
Dept: INTERNAL MEDICINE | Facility: CLINIC | Age: 42
End: 2022-08-10
Payer: COMMERCIAL

## 2022-08-10 VITALS
WEIGHT: 178.56 LBS | HEIGHT: 59 IN | DIASTOLIC BLOOD PRESSURE: 84 MMHG | OXYGEN SATURATION: 98 % | HEART RATE: 82 BPM | BODY MASS INDEX: 36 KG/M2 | SYSTOLIC BLOOD PRESSURE: 128 MMHG

## 2022-08-10 DIAGNOSIS — J32.9 SINUSITIS, UNSPECIFIED CHRONICITY, UNSPECIFIED LOCATION: Primary | ICD-10-CM

## 2022-08-10 DIAGNOSIS — G43.009 MIGRAINE WITHOUT AURA AND WITHOUT STATUS MIGRAINOSUS, NOT INTRACTABLE: ICD-10-CM

## 2022-08-10 PROCEDURE — 99999 PR PBB SHADOW E&M-EST. PATIENT-LVL III: CPT | Mod: PBBFAC,,, | Performed by: INTERNAL MEDICINE

## 2022-08-10 PROCEDURE — 1159F PR MEDICATION LIST DOCUMENTED IN MEDICAL RECORD: ICD-10-PCS | Mod: CPTII,S$GLB,, | Performed by: INTERNAL MEDICINE

## 2022-08-10 PROCEDURE — 3044F HG A1C LEVEL LT 7.0%: CPT | Mod: CPTII,S$GLB,, | Performed by: INTERNAL MEDICINE

## 2022-08-10 PROCEDURE — 99213 OFFICE O/P EST LOW 20 MIN: CPT | Mod: S$GLB,,, | Performed by: INTERNAL MEDICINE

## 2022-08-10 PROCEDURE — 1159F MED LIST DOCD IN RCRD: CPT | Mod: CPTII,S$GLB,, | Performed by: INTERNAL MEDICINE

## 2022-08-10 PROCEDURE — 3079F DIAST BP 80-89 MM HG: CPT | Mod: CPTII,S$GLB,, | Performed by: INTERNAL MEDICINE

## 2022-08-10 PROCEDURE — 3008F BODY MASS INDEX DOCD: CPT | Mod: CPTII,S$GLB,, | Performed by: INTERNAL MEDICINE

## 2022-08-10 PROCEDURE — 3074F PR MOST RECENT SYSTOLIC BLOOD PRESSURE < 130 MM HG: ICD-10-PCS | Mod: CPTII,S$GLB,, | Performed by: INTERNAL MEDICINE

## 2022-08-10 PROCEDURE — 3044F PR MOST RECENT HEMOGLOBIN A1C LEVEL <7.0%: ICD-10-PCS | Mod: CPTII,S$GLB,, | Performed by: INTERNAL MEDICINE

## 2022-08-10 PROCEDURE — 3074F SYST BP LT 130 MM HG: CPT | Mod: CPTII,S$GLB,, | Performed by: INTERNAL MEDICINE

## 2022-08-10 PROCEDURE — 99999 PR PBB SHADOW E&M-EST. PATIENT-LVL III: ICD-10-PCS | Mod: PBBFAC,,, | Performed by: INTERNAL MEDICINE

## 2022-08-10 PROCEDURE — 3008F PR BODY MASS INDEX (BMI) DOCUMENTED: ICD-10-PCS | Mod: CPTII,S$GLB,, | Performed by: INTERNAL MEDICINE

## 2022-08-10 PROCEDURE — 99213 PR OFFICE/OUTPT VISIT, EST, LEVL III, 20-29 MIN: ICD-10-PCS | Mod: S$GLB,,, | Performed by: INTERNAL MEDICINE

## 2022-08-10 PROCEDURE — 3079F PR MOST RECENT DIASTOLIC BLOOD PRESSURE 80-89 MM HG: ICD-10-PCS | Mod: CPTII,S$GLB,, | Performed by: INTERNAL MEDICINE

## 2022-08-10 RX ORDER — DOXYCYCLINE 100 MG/1
100 CAPSULE ORAL 2 TIMES DAILY
Qty: 14 CAPSULE | Refills: 0 | Status: SHIPPED | OUTPATIENT
Start: 2022-08-10 | End: 2022-08-17

## 2022-08-10 NOTE — PROGRESS NOTES
Subjective:       Patient ID: Kelly Thomason is a 41 y.o. female.   Chief Complaint: Headache and Sore Throat    Symptoms for 2-3 weeks. Covid PCR negative. Her dad had some congestion, got better. She tried OTC meds with limited relief.  She is concerned she started off with a virus that may be has now become sinus infection.  She has had some ear congestion sinus and throat congestion.  Minimal cough.    She has a history of migraines but says this is different from that.    Sore Throat   This is a new problem. The current episode started 1 to 4 weeks ago. The problem has been unchanged. Neither side of throat is experiencing more pain than the other. There has been no fever. The pain is at a severity of 2/10. The pain is mild. Associated symptoms include congestion, ear pain, headaches and swollen glands. Pertinent negatives include no abdominal pain, coughing, diarrhea, drooling, ear discharge, hoarse voice, plugged ear sensation, neck pain, shortness of breath, stridor, trouble swallowing or vomiting. She has had exposure to strep. She has had no exposure to mono. She has tried NSAIDs and acetaminophen for the symptoms. The treatment provided mild relief.     Review of Systems   Constitutional: Negative for fever.   HENT: Positive for nasal congestion, ear pain and sore throat. Negative for drooling, ear discharge, hoarse voice and trouble swallowing.    Respiratory: Negative for cough, shortness of breath and stridor.    Gastrointestinal: Negative for abdominal pain, diarrhea and vomiting.   Musculoskeletal: Negative for neck pain.   Neurological: Positive for headaches.          Objective:      Physical Exam  Constitutional:       General: She is not in acute distress.     Appearance: She is well-developed.   HENT:      Head: Normocephalic and atraumatic.      Right Ear: Tympanic membrane, ear canal and external ear normal.      Left Ear: Tympanic membrane, ear canal and external ear normal.      Nose:  Congestion present.      Mouth/Throat:      Pharynx: No oropharyngeal exudate or posterior oropharyngeal erythema.      Comments: Clear thin drainage in the back of the throat  Eyes:      General: No scleral icterus.     Conjunctiva/sclera: Conjunctivae normal.      Pupils: Pupils are equal, round, and reactive to light.   Neck:      Thyroid: No thyromegaly.   Cardiovascular:      Rate and Rhythm: Normal rate and regular rhythm.      Pulses: Normal pulses.      Heart sounds: No murmur heard.  Pulmonary:      Effort: Pulmonary effort is normal.      Breath sounds: Normal breath sounds. No wheezing.   Abdominal:      General: Bowel sounds are normal. There is no distension.      Palpations: Abdomen is soft.      Tenderness: There is no abdominal tenderness.   Musculoskeletal:         General: No tenderness.      Cervical back: Normal range of motion and neck supple.      Right lower leg: No edema.      Left lower leg: No edema.   Lymphadenopathy:      Cervical: No cervical adenopathy.   Skin:     Coloration: Skin is not jaundiced.      Findings: No rash.   Neurological:      General: No focal deficit present.      Mental Status: She is alert and oriented to person, place, and time.   Psychiatric:         Mood and Affect: Mood normal.         Behavior: Behavior normal.         Assessment:       Problem List Items Addressed This Visit        Neuro    Migraine without aura and without status migrainosus, not intractable      Other Visit Diagnoses     Sinusitis, unspecified chronicity, unspecified location    -  Primary          Plan:        was seen today for headache and sore throat.    Diagnoses and all orders for this visit:    Sinusitis, unspecified chronicity, unspecified location    Migraine without aura and without status migrainosus, not intractable    Other orders  -     doxycycline (VIBRAMYCIN) 100 MG Cap; Take 1 capsule (100 mg total) by mouth 2 (two) times daily. for 7 days         May continue Coricidin  "or Mucinex.  Call if symptoms fail to improve or worsen  Taking antibiotic with full glass of water or a meal.        Portions of this note may have been created with voice recognition software. Occasional "wrong-word" or "sound-a-like" substitutions may have occurred due to the inherent limitations of voice recognition software. Please, read the note carefully and recognize, using context, where substitutions have occurred.  "

## 2022-08-26 ENCOUNTER — HOSPITAL ENCOUNTER (OUTPATIENT)
Dept: RADIOLOGY | Facility: HOSPITAL | Age: 42
Discharge: HOME | End: 2022-08-26
Attending: PHYSICIAN ASSISTANT
Payer: COMMERCIAL

## 2022-08-26 ENCOUNTER — OFFICE VISIT (OUTPATIENT)
Dept: INTERNAL MEDICINE | Facility: CLINIC | Age: 42
End: 2022-08-26
Payer: COMMERCIAL

## 2022-08-26 VITALS
SYSTOLIC BLOOD PRESSURE: 118 MMHG | WEIGHT: 181 LBS | BODY MASS INDEX: 36.49 KG/M2 | DIASTOLIC BLOOD PRESSURE: 80 MMHG | HEIGHT: 59 IN | HEART RATE: 95 BPM | OXYGEN SATURATION: 98 %

## 2022-08-26 DIAGNOSIS — M25.571 ACUTE RIGHT ANKLE PAIN: ICD-10-CM

## 2022-08-26 DIAGNOSIS — M79.671 RIGHT FOOT PAIN: Primary | ICD-10-CM

## 2022-08-26 DIAGNOSIS — M79.671 RIGHT FOOT PAIN: ICD-10-CM

## 2022-08-26 PROCEDURE — 3079F DIAST BP 80-89 MM HG: CPT | Mod: CPTII,S$GLB,, | Performed by: PHYSICIAN ASSISTANT

## 2022-08-26 PROCEDURE — 3044F PR MOST RECENT HEMOGLOBIN A1C LEVEL <7.0%: ICD-10-PCS | Mod: CPTII,S$GLB,, | Performed by: PHYSICIAN ASSISTANT

## 2022-08-26 PROCEDURE — 1159F MED LIST DOCD IN RCRD: CPT | Mod: CPTII,S$GLB,, | Performed by: PHYSICIAN ASSISTANT

## 2022-08-26 PROCEDURE — 3044F HG A1C LEVEL LT 7.0%: CPT | Mod: CPTII,S$GLB,, | Performed by: PHYSICIAN ASSISTANT

## 2022-08-26 PROCEDURE — 3008F PR BODY MASS INDEX (BMI) DOCUMENTED: ICD-10-PCS | Mod: CPTII,S$GLB,, | Performed by: PHYSICIAN ASSISTANT

## 2022-08-26 PROCEDURE — 99213 OFFICE O/P EST LOW 20 MIN: CPT | Mod: S$GLB,,, | Performed by: PHYSICIAN ASSISTANT

## 2022-08-26 PROCEDURE — 99213 PR OFFICE/OUTPT VISIT, EST, LEVL III, 20-29 MIN: ICD-10-PCS | Mod: S$GLB,,, | Performed by: PHYSICIAN ASSISTANT

## 2022-08-26 PROCEDURE — 1160F RVW MEDS BY RX/DR IN RCRD: CPT | Mod: CPTII,S$GLB,, | Performed by: PHYSICIAN ASSISTANT

## 2022-08-26 PROCEDURE — 3079F PR MOST RECENT DIASTOLIC BLOOD PRESSURE 80-89 MM HG: ICD-10-PCS | Mod: CPTII,S$GLB,, | Performed by: PHYSICIAN ASSISTANT

## 2022-08-26 PROCEDURE — 99999 PR PBB SHADOW E&M-EST. PATIENT-LVL IV: CPT | Mod: PBBFAC,,, | Performed by: PHYSICIAN ASSISTANT

## 2022-08-26 PROCEDURE — 99999 PR PBB SHADOW E&M-EST. PATIENT-LVL IV: ICD-10-PCS | Mod: PBBFAC,,, | Performed by: PHYSICIAN ASSISTANT

## 2022-08-26 PROCEDURE — 1160F PR REVIEW ALL MEDS BY PRESCRIBER/CLIN PHARMACIST DOCUMENTED: ICD-10-PCS | Mod: CPTII,S$GLB,, | Performed by: PHYSICIAN ASSISTANT

## 2022-08-26 PROCEDURE — 3008F BODY MASS INDEX DOCD: CPT | Mod: CPTII,S$GLB,, | Performed by: PHYSICIAN ASSISTANT

## 2022-08-26 PROCEDURE — 3074F SYST BP LT 130 MM HG: CPT | Mod: CPTII,S$GLB,, | Performed by: PHYSICIAN ASSISTANT

## 2022-08-26 PROCEDURE — 1159F PR MEDICATION LIST DOCUMENTED IN MEDICAL RECORD: ICD-10-PCS | Mod: CPTII,S$GLB,, | Performed by: PHYSICIAN ASSISTANT

## 2022-08-26 PROCEDURE — 73610 XR ANKLE COMPLETE 3 VIEW RIGHT: ICD-10-PCS | Mod: 26,RT,, | Performed by: RADIOLOGY

## 2022-08-26 PROCEDURE — 73610 X-RAY EXAM OF ANKLE: CPT | Mod: TC,RT

## 2022-08-26 PROCEDURE — 3074F PR MOST RECENT SYSTOLIC BLOOD PRESSURE < 130 MM HG: ICD-10-PCS | Mod: CPTII,S$GLB,, | Performed by: PHYSICIAN ASSISTANT

## 2022-08-26 PROCEDURE — 73610 X-RAY EXAM OF ANKLE: CPT | Mod: 26,RT,, | Performed by: RADIOLOGY

## 2022-08-26 NOTE — PROGRESS NOTES
"    Subjective:       Patient ID: Kelly Thomason is a 42 y.o. female.    Chief Complaint: Foot Pain    HPI     Established pt of Christiano Hurst MD (new to me)    Same day/urgent care appt    Here with concerns of right foot/ankle pain aft slip/twisting in her flip flops in the wet grass about 2 days ago. No swelling or bruising. She is able to bear weight. Took aleve last night with some improvement. Pain rated 3/10 today.     Past Medical History:   Diagnosis Date    Abnormal Pap smear of cervix     Depression     Head ache     Hives     Kidney stones     Urticaria      Social History     Tobacco Use    Smoking status: Never Smoker    Smokeless tobacco: Never Used   Substance Use Topics    Alcohol use: Not Currently     Comment: Rare    Drug use: No     Review of patient's allergies indicates:   Allergen Reactions    Percocet [oxycodone-acetaminophen]            Review of Systems    Answers for HPI/ROS submitted by the patient on 8/26/2022  activity change: No  unexpected weight change: No  neck pain: No  hearing loss: No  rhinorrhea: No  trouble swallowing: No  eye discharge: No  visual disturbance: No  chest tightness: No  wheezing: No  chest pain: No  palpitations: No  blood in stool: No  constipation: No  vomiting: No  diarrhea: No  polydipsia: No  polyuria: No  difficulty urinating: No  hematuria: No  menstrual problem: No  dysuria: No  joint swelling: No  arthralgias: No  headaches: No  weakness: No  confusion: No  dysphoric mood: No  Objective: /80 (BP Location: Right arm, Patient Position: Sitting, BP Method: Large (Manual))   Pulse 95   Ht 4' 11" (1.499 m)   Wt 82.1 kg (181 lb)   SpO2 98%   BMI 36.56 kg/m²         Physical Exam  Constitutional:       General: She is not in acute distress.     Appearance: She is not ill-appearing.   Musculoskeletal:      Right lower leg: No edema.      Left lower leg: No edema.      Right ankle: No swelling, deformity, ecchymosis or lacerations. " Tenderness present over the lateral malleolus. Normal range of motion. Normal pulse.      Right Achilles Tendon: Normal. No tenderness.      Left ankle: Normal. No swelling, deformity or ecchymosis. No tenderness. Normal range of motion. Normal pulse.      Left Achilles Tendon: Normal.   Skin:     General: Skin is warm.      Findings: No bruising, erythema or rash.   Neurological:      Mental Status: She is alert.         Assessment:       Problem List Items Addressed This Visit    None     Visit Diagnoses     Right foot pain    -  Primary    Relevant Orders    X-Ray Ankle Complete Right    Acute right ankle pain        Relevant Orders    X-Ray Ankle Complete Right          Plan:        was seen today for foot pain.    Diagnoses and all orders for this visit:    Right foot pain  Acute right ankle pain  -     X-Ray Ankle Complete Right; Future  -     Advised on symptomatic care, rest, ice, elevation, NSAIDs prn, proper shoe wear  -    Notify clinic if symptoms worsen or fail to improve.     Savana Schwarz PA-C

## 2022-09-24 ENCOUNTER — PATIENT MESSAGE (OUTPATIENT)
Dept: NEUROLOGY | Facility: CLINIC | Age: 42
End: 2022-09-24
Payer: COMMERCIAL

## 2022-11-28 ENCOUNTER — OFFICE VISIT (OUTPATIENT)
Dept: INTERNAL MEDICINE | Facility: CLINIC | Age: 42
End: 2022-11-28
Payer: COMMERCIAL

## 2022-11-28 VITALS
OXYGEN SATURATION: 97 % | SYSTOLIC BLOOD PRESSURE: 124 MMHG | DIASTOLIC BLOOD PRESSURE: 80 MMHG | HEIGHT: 59 IN | BODY MASS INDEX: 37.56 KG/M2 | HEART RATE: 100 BPM | WEIGHT: 186.31 LBS

## 2022-11-28 DIAGNOSIS — R20.2 NUMBNESS AND TINGLING OF RIGHT ARM: Primary | ICD-10-CM

## 2022-11-28 DIAGNOSIS — R20.0 NUMBNESS AND TINGLING OF RIGHT ARM: Primary | ICD-10-CM

## 2022-11-28 PROCEDURE — 1160F PR REVIEW ALL MEDS BY PRESCRIBER/CLIN PHARMACIST DOCUMENTED: ICD-10-PCS | Mod: CPTII,S$GLB,, | Performed by: PHYSICIAN ASSISTANT

## 2022-11-28 PROCEDURE — 99999 PR PBB SHADOW E&M-EST. PATIENT-LVL IV: ICD-10-PCS | Mod: PBBFAC,,, | Performed by: PHYSICIAN ASSISTANT

## 2022-11-28 PROCEDURE — 3044F HG A1C LEVEL LT 7.0%: CPT | Mod: CPTII,S$GLB,, | Performed by: PHYSICIAN ASSISTANT

## 2022-11-28 PROCEDURE — 99213 PR OFFICE/OUTPT VISIT, EST, LEVL III, 20-29 MIN: ICD-10-PCS | Mod: S$GLB,,, | Performed by: PHYSICIAN ASSISTANT

## 2022-11-28 PROCEDURE — 1159F PR MEDICATION LIST DOCUMENTED IN MEDICAL RECORD: ICD-10-PCS | Mod: CPTII,S$GLB,, | Performed by: PHYSICIAN ASSISTANT

## 2022-11-28 PROCEDURE — 1159F MED LIST DOCD IN RCRD: CPT | Mod: CPTII,S$GLB,, | Performed by: PHYSICIAN ASSISTANT

## 2022-11-28 PROCEDURE — 99213 OFFICE O/P EST LOW 20 MIN: CPT | Mod: S$GLB,,, | Performed by: PHYSICIAN ASSISTANT

## 2022-11-28 PROCEDURE — 1160F RVW MEDS BY RX/DR IN RCRD: CPT | Mod: CPTII,S$GLB,, | Performed by: PHYSICIAN ASSISTANT

## 2022-11-28 PROCEDURE — 3008F BODY MASS INDEX DOCD: CPT | Mod: CPTII,S$GLB,, | Performed by: PHYSICIAN ASSISTANT

## 2022-11-28 PROCEDURE — 99999 PR PBB SHADOW E&M-EST. PATIENT-LVL IV: CPT | Mod: PBBFAC,,, | Performed by: PHYSICIAN ASSISTANT

## 2022-11-28 PROCEDURE — 3044F PR MOST RECENT HEMOGLOBIN A1C LEVEL <7.0%: ICD-10-PCS | Mod: CPTII,S$GLB,, | Performed by: PHYSICIAN ASSISTANT

## 2022-11-28 PROCEDURE — 3074F SYST BP LT 130 MM HG: CPT | Mod: CPTII,S$GLB,, | Performed by: PHYSICIAN ASSISTANT

## 2022-11-28 PROCEDURE — 3079F DIAST BP 80-89 MM HG: CPT | Mod: CPTII,S$GLB,, | Performed by: PHYSICIAN ASSISTANT

## 2022-11-28 PROCEDURE — 3008F PR BODY MASS INDEX (BMI) DOCUMENTED: ICD-10-PCS | Mod: CPTII,S$GLB,, | Performed by: PHYSICIAN ASSISTANT

## 2022-11-28 PROCEDURE — 3074F PR MOST RECENT SYSTOLIC BLOOD PRESSURE < 130 MM HG: ICD-10-PCS | Mod: CPTII,S$GLB,, | Performed by: PHYSICIAN ASSISTANT

## 2022-11-28 PROCEDURE — 3079F PR MOST RECENT DIASTOLIC BLOOD PRESSURE 80-89 MM HG: ICD-10-PCS | Mod: CPTII,S$GLB,, | Performed by: PHYSICIAN ASSISTANT

## 2022-11-28 NOTE — PROGRESS NOTES
"    Subjective:       Patient ID: Kelly Thomason is a 42 y.o. female.    Chief Complaint: arm numbness    HPI    Established pt of Christiano Hurst MD (new to me)    Here with concerns of brief episodes of numbness to right arm from elbow down to hand/fingers, typically happens while leaning on right arm, in recliner, holding book/phone. Also notes tingling/numbness tip of right thumb, not associated with positional changes. She also notes wrist/elbow pain after bowling (she is apart of bowling team) Onset of symptoms about 1.5 months ago. No extremity weakness, handwriting or coordination issues. No neck pain.     Past Medical History:   Diagnosis Date    Abnormal Pap smear of cervix     Depression     Head ache     Hives     Kidney stones     Urticaria      Social History     Tobacco Use    Smoking status: Never    Smokeless tobacco: Never   Substance Use Topics    Alcohol use: Not Currently     Comment: Rare    Drug use: No     Review of patient's allergies indicates:   Allergen Reactions    Percocet [oxycodone-acetaminophen]          Review of Systems    Answers submitted by the patient for this visit:  Review of Systems Questionnaire (Submitted on 11/21/2022)  activity change: No  unexpected weight change: No  neck pain: No  hearing loss: No  rhinorrhea: No  trouble swallowing: No  eye discharge: No  visual disturbance: No  chest tightness: No  wheezing: No  chest pain: No  palpitations: No  blood in stool: No  constipation: No  vomiting: No  diarrhea: No  polydipsia: No  polyuria: No  difficulty urinating: No  hematuria: No  menstrual problem: No  dysuria: No  joint swelling: No  arthralgias: Yes  headaches: No  weakness: No  confusion: No  dysphoric mood: No  Objective: /80 (BP Location: Left arm, Patient Position: Sitting, BP Method: Large (Manual))   Pulse 100   Ht 4' 11" (1.499 m)   Wt 84.5 kg (186 lb 4.6 oz)   SpO2 97%   BMI 37.63 kg/m²         Physical Exam  Constitutional:       General: " She is not in acute distress.     Appearance: She is not ill-appearing.   Cardiovascular:      Rate and Rhythm: Normal rate and regular rhythm.      Pulses: Normal pulses.      Heart sounds: No murmur heard.  Pulmonary:      Effort: Pulmonary effort is normal. No respiratory distress.      Breath sounds: No wheezing.   Musculoskeletal:         General: No swelling, tenderness or deformity. Normal range of motion.      Right elbow: Normal. No swelling, deformity or effusion. Normal range of motion. No tenderness.      Right hand: No swelling, deformity or tenderness. Normal range of motion. Normal pulse.      Left hand: No swelling or tenderness. Normal range of motion. Normal pulse.      Comments: Negative tinel's and phalen's and R elbow and R wrist  Negative Spurlings   Skin:     Findings: No rash.   Neurological:      Mental Status: She is alert.       Assessment:       Problem List Items Addressed This Visit    None  Visit Diagnoses       Numbness and tingling of right arm    -  Primary    Relevant Orders    EMG W/ ULTRASOUND AND NERVE CONDUCTION TEST 1 Extremity            Plan:        was seen today for arm numbness.    Diagnoses and all orders for this visit:    Numbness and tingling of right arm  -     EMG W/ ULTRASOUND AND NERVE CONDUCTION TEST 1 Extremity; Future  Advised on positional changes  Elbow cushion/padding  Follow up EMG    Savana Schwarz PA-C

## 2022-11-29 ENCOUNTER — TELEPHONE (OUTPATIENT)
Dept: PHARMACY | Facility: CLINIC | Age: 42
End: 2022-11-29
Payer: COMMERCIAL

## 2023-01-17 ENCOUNTER — HOSPITAL ENCOUNTER (EMERGENCY)
Facility: HOSPITAL | Age: 43
Discharge: HOME | End: 2023-01-17
Attending: STUDENT IN AN ORGANIZED HEALTH CARE EDUCATION/TRAINING PROGRAM
Payer: COMMERCIAL

## 2023-01-17 ENCOUNTER — TELEPHONE (OUTPATIENT)
Dept: INTERNAL MEDICINE | Facility: CLINIC | Age: 43
End: 2023-01-17
Payer: COMMERCIAL

## 2023-01-17 ENCOUNTER — OFFICE VISIT (OUTPATIENT)
Dept: SURGERY | Facility: CLINIC | Age: 43
End: 2023-01-17
Payer: COMMERCIAL

## 2023-01-17 ENCOUNTER — PATIENT MESSAGE (OUTPATIENT)
Dept: INTERNAL MEDICINE | Facility: CLINIC | Age: 43
End: 2023-01-17
Payer: COMMERCIAL

## 2023-01-17 VITALS
OXYGEN SATURATION: 98 % | HEIGHT: 59 IN | WEIGHT: 180 LBS | BODY MASS INDEX: 36.29 KG/M2 | SYSTOLIC BLOOD PRESSURE: 161 MMHG | HEART RATE: 82 BPM | RESPIRATION RATE: 20 BRPM | TEMPERATURE: 99 F | DIASTOLIC BLOOD PRESSURE: 103 MMHG

## 2023-01-17 DIAGNOSIS — K80.20 CALCULUS OF GALLBLADDER WITHOUT CHOLECYSTITIS WITHOUT OBSTRUCTION: ICD-10-CM

## 2023-01-17 DIAGNOSIS — K80.50 BILIARY COLIC: ICD-10-CM

## 2023-01-17 DIAGNOSIS — R10.13 EPIGASTRIC ABDOMINAL PAIN: ICD-10-CM

## 2023-01-17 DIAGNOSIS — R10.13 EPIGASTRIC PAIN: ICD-10-CM

## 2023-01-17 DIAGNOSIS — K80.20 CALCULUS OF GALLBLADDER WITHOUT CHOLECYSTITIS WITHOUT OBSTRUCTION: Primary | ICD-10-CM

## 2023-01-17 LAB
ALBUMIN SERPL BCP-MCNC: 4 G/DL (ref 3.5–5.2)
ALP SERPL-CCNC: 108 U/L (ref 55–135)
ALT SERPL W/O P-5'-P-CCNC: 34 U/L (ref 10–44)
ANION GAP SERPL CALC-SCNC: 11 MMOL/L (ref 8–16)
AST SERPL-CCNC: 22 U/L (ref 10–40)
B-HCG UR QL: NEGATIVE
BACTERIA #/AREA URNS AUTO: ABNORMAL /HPF
BASOPHILS # BLD AUTO: 0.02 K/UL (ref 0–0.2)
BASOPHILS NFR BLD: 0.2 % (ref 0–1.9)
BILIRUB SERPL-MCNC: 0.3 MG/DL (ref 0.1–1)
BILIRUB UR QL STRIP: NEGATIVE
BUN SERPL-MCNC: 21 MG/DL (ref 6–20)
CALCIUM SERPL-MCNC: 10.2 MG/DL (ref 8.7–10.5)
CAOX CRY UR QL COMP ASSIST: ABNORMAL
CHLORIDE SERPL-SCNC: 106 MMOL/L (ref 95–110)
CLARITY UR REFRACT.AUTO: ABNORMAL
CO2 SERPL-SCNC: 23 MMOL/L (ref 23–29)
COLOR UR AUTO: YELLOW
CREAT SERPL-MCNC: 0.8 MG/DL (ref 0.5–1.4)
CTP QC/QA: YES
DIFFERENTIAL METHOD: ABNORMAL
EOSINOPHIL # BLD AUTO: 0.1 K/UL (ref 0–0.5)
EOSINOPHIL NFR BLD: 0.8 % (ref 0–8)
ERYTHROCYTE [DISTWIDTH] IN BLOOD BY AUTOMATED COUNT: 12.9 % (ref 11.5–14.5)
EST. GFR  (NO RACE VARIABLE): >60 ML/MIN/1.73 M^2
GLUCOSE SERPL-MCNC: 115 MG/DL (ref 70–110)
GLUCOSE UR QL STRIP: NEGATIVE
HCT VFR BLD AUTO: 41.9 % (ref 37–48.5)
HCV AB SERPL QL IA: NORMAL
HGB BLD-MCNC: 14.2 G/DL (ref 12–16)
HGB UR QL STRIP: NEGATIVE
HIV 1+2 AB+HIV1 P24 AG SERPL QL IA: NORMAL
HYALINE CASTS UR QL AUTO: 3 /LPF
IMM GRANULOCYTES # BLD AUTO: 0.04 K/UL (ref 0–0.04)
IMM GRANULOCYTES NFR BLD AUTO: 0.4 % (ref 0–0.5)
KETONES UR QL STRIP: NEGATIVE
LEUKOCYTE ESTERASE UR QL STRIP: ABNORMAL
LIPASE SERPL-CCNC: 30 U/L (ref 4–60)
LYMPHOCYTES # BLD AUTO: 2 K/UL (ref 1–4.8)
LYMPHOCYTES NFR BLD: 18.4 % (ref 18–48)
MCH RBC QN AUTO: 31.1 PG (ref 27–31)
MCHC RBC AUTO-ENTMCNC: 33.9 G/DL (ref 32–36)
MCV RBC AUTO: 92 FL (ref 82–98)
MICROSCOPIC COMMENT: ABNORMAL
MONOCYTES # BLD AUTO: 0.7 K/UL (ref 0.3–1)
MONOCYTES NFR BLD: 6.6 % (ref 4–15)
NEUTROPHILS # BLD AUTO: 8.1 K/UL (ref 1.8–7.7)
NEUTROPHILS NFR BLD: 73.6 % (ref 38–73)
NITRITE UR QL STRIP: NEGATIVE
NRBC BLD-RTO: 0 /100 WBC
PH UR STRIP: 6 [PH] (ref 5–8)
PLATELET # BLD AUTO: 338 K/UL (ref 150–450)
PMV BLD AUTO: 9.2 FL (ref 9.2–12.9)
POTASSIUM SERPL-SCNC: 4 MMOL/L (ref 3.5–5.1)
PROT SERPL-MCNC: 7.6 G/DL (ref 6–8.4)
PROT UR QL STRIP: NEGATIVE
RBC # BLD AUTO: 4.57 M/UL (ref 4–5.4)
RBC #/AREA URNS AUTO: 3 /HPF (ref 0–4)
SODIUM SERPL-SCNC: 140 MMOL/L (ref 136–145)
SP GR UR STRIP: 1.02 (ref 1–1.03)
SQUAMOUS #/AREA URNS AUTO: 9 /HPF
URN SPEC COLLECT METH UR: ABNORMAL
WBC # BLD AUTO: 10.98 K/UL (ref 3.9–12.7)
WBC #/AREA URNS AUTO: 7 /HPF (ref 0–5)

## 2023-01-17 PROCEDURE — 25000003 PHARM REV CODE 250: Performed by: STUDENT IN AN ORGANIZED HEALTH CARE EDUCATION/TRAINING PROGRAM

## 2023-01-17 PROCEDURE — 1160F PR REVIEW ALL MEDS BY PRESCRIBER/CLIN PHARMACIST DOCUMENTED: ICD-10-PCS | Mod: CPTII,95,, | Performed by: SURGERY

## 2023-01-17 PROCEDURE — 96374 THER/PROPH/DIAG INJ IV PUSH: CPT

## 2023-01-17 PROCEDURE — 93010 EKG 12-LEAD: ICD-10-PCS | Mod: ,,, | Performed by: INTERNAL MEDICINE

## 2023-01-17 PROCEDURE — 83690 ASSAY OF LIPASE: CPT | Performed by: STUDENT IN AN ORGANIZED HEALTH CARE EDUCATION/TRAINING PROGRAM

## 2023-01-17 PROCEDURE — 99285 PR EMERGENCY DEPT VISIT,LEVEL V: ICD-10-PCS | Mod: ,,, | Performed by: STUDENT IN AN ORGANIZED HEALTH CARE EDUCATION/TRAINING PROGRAM

## 2023-01-17 PROCEDURE — 1160F RVW MEDS BY RX/DR IN RCRD: CPT | Mod: CPTII,95,, | Performed by: SURGERY

## 2023-01-17 PROCEDURE — 99285 EMERGENCY DEPT VISIT HI MDM: CPT | Mod: ,,, | Performed by: STUDENT IN AN ORGANIZED HEALTH CARE EDUCATION/TRAINING PROGRAM

## 2023-01-17 PROCEDURE — 99203 OFFICE O/P NEW LOW 30 MIN: CPT | Mod: 95,,, | Performed by: SURGERY

## 2023-01-17 PROCEDURE — 1159F PR MEDICATION LIST DOCUMENTED IN MEDICAL RECORD: ICD-10-PCS | Mod: CPTII,95,, | Performed by: SURGERY

## 2023-01-17 PROCEDURE — 25500020 PHARM REV CODE 255: Performed by: STUDENT IN AN ORGANIZED HEALTH CARE EDUCATION/TRAINING PROGRAM

## 2023-01-17 PROCEDURE — 81025 URINE PREGNANCY TEST: CPT | Performed by: STUDENT IN AN ORGANIZED HEALTH CARE EDUCATION/TRAINING PROGRAM

## 2023-01-17 PROCEDURE — 80053 COMPREHEN METABOLIC PANEL: CPT | Performed by: STUDENT IN AN ORGANIZED HEALTH CARE EDUCATION/TRAINING PROGRAM

## 2023-01-17 PROCEDURE — 99285 EMERGENCY DEPT VISIT HI MDM: CPT | Mod: 25

## 2023-01-17 PROCEDURE — 81001 URINALYSIS AUTO W/SCOPE: CPT | Performed by: STUDENT IN AN ORGANIZED HEALTH CARE EDUCATION/TRAINING PROGRAM

## 2023-01-17 PROCEDURE — 93010 ELECTROCARDIOGRAM REPORT: CPT | Mod: ,,, | Performed by: INTERNAL MEDICINE

## 2023-01-17 PROCEDURE — 93005 ELECTROCARDIOGRAM TRACING: CPT

## 2023-01-17 PROCEDURE — 87389 HIV-1 AG W/HIV-1&-2 AB AG IA: CPT | Performed by: PHYSICIAN ASSISTANT

## 2023-01-17 PROCEDURE — 99203 PR OFFICE/OUTPT VISIT, NEW, LEVL III, 30-44 MIN: ICD-10-PCS | Mod: 95,,, | Performed by: SURGERY

## 2023-01-17 PROCEDURE — 63600175 PHARM REV CODE 636 W HCPCS: Performed by: STUDENT IN AN ORGANIZED HEALTH CARE EDUCATION/TRAINING PROGRAM

## 2023-01-17 PROCEDURE — C9113 INJ PANTOPRAZOLE SODIUM, VIA: HCPCS | Performed by: STUDENT IN AN ORGANIZED HEALTH CARE EDUCATION/TRAINING PROGRAM

## 2023-01-17 PROCEDURE — 96375 TX/PRO/DX INJ NEW DRUG ADDON: CPT

## 2023-01-17 PROCEDURE — 1159F MED LIST DOCD IN RCRD: CPT | Mod: CPTII,95,, | Performed by: SURGERY

## 2023-01-17 PROCEDURE — 85025 COMPLETE CBC W/AUTO DIFF WBC: CPT | Performed by: STUDENT IN AN ORGANIZED HEALTH CARE EDUCATION/TRAINING PROGRAM

## 2023-01-17 PROCEDURE — 86803 HEPATITIS C AB TEST: CPT | Performed by: PHYSICIAN ASSISTANT

## 2023-01-17 RX ORDER — NITROFURANTOIN 25; 75 MG/1; MG/1
100 CAPSULE ORAL 2 TIMES DAILY
Qty: 14 CAPSULE | Refills: 0 | Status: SHIPPED | OUTPATIENT
Start: 2023-01-17 | End: 2023-02-07

## 2023-01-17 RX ORDER — MAG HYDROX/ALUMINUM HYD/SIMETH 200-200-20
30 SUSPENSION, ORAL (FINAL DOSE FORM) ORAL
Status: DISCONTINUED | OUTPATIENT
Start: 2023-01-17 | End: 2023-01-17 | Stop reason: HOSPADM

## 2023-01-17 RX ORDER — ONDANSETRON 4 MG/1
4 TABLET, FILM COATED ORAL EVERY 6 HOURS PRN
Qty: 12 TABLET | Refills: 0 | Status: SHIPPED | OUTPATIENT
Start: 2023-01-17 | End: 2023-02-07

## 2023-01-17 RX ORDER — SUCRALFATE 1 G/10ML
1 SUSPENSION ORAL EVERY 6 HOURS
Status: DISCONTINUED | OUTPATIENT
Start: 2023-01-17 | End: 2023-01-17 | Stop reason: HOSPADM

## 2023-01-17 RX ORDER — FENTANYL CITRATE 50 UG/ML
50 INJECTION, SOLUTION INTRAMUSCULAR; INTRAVENOUS
Status: COMPLETED | OUTPATIENT
Start: 2023-01-17 | End: 2023-01-17

## 2023-01-17 RX ORDER — HYDROCODONE BITARTRATE AND ACETAMINOPHEN 5; 325 MG/1; MG/1
1 TABLET ORAL EVERY 6 HOURS PRN
Qty: 12 TABLET | Refills: 0 | Status: ON HOLD | OUTPATIENT
Start: 2023-01-17 | End: 2023-01-25 | Stop reason: HOSPADM

## 2023-01-17 RX ORDER — MAG HYDROX/ALUMINUM HYD/SIMETH 200-200-20
30 SUSPENSION, ORAL (FINAL DOSE FORM) ORAL ONCE
Status: COMPLETED | OUTPATIENT
Start: 2023-01-17 | End: 2023-01-17

## 2023-01-17 RX ORDER — LIDOCAINE HYDROCHLORIDE 20 MG/ML
15 SOLUTION OROPHARYNGEAL ONCE
Status: COMPLETED | OUTPATIENT
Start: 2023-01-17 | End: 2023-01-17

## 2023-01-17 RX ORDER — PANTOPRAZOLE SODIUM 40 MG/10ML
40 INJECTION, POWDER, LYOPHILIZED, FOR SOLUTION INTRAVENOUS
Status: COMPLETED | OUTPATIENT
Start: 2023-01-17 | End: 2023-01-17

## 2023-01-17 RX ADMIN — IOHEXOL 100 ML: 350 INJECTION, SOLUTION INTRAVENOUS at 02:01

## 2023-01-17 RX ADMIN — FENTANYL CITRATE 50 MCG: 50 INJECTION INTRAMUSCULAR; INTRAVENOUS at 02:01

## 2023-01-17 RX ADMIN — PANTOPRAZOLE SODIUM 40 MG: 40 INJECTION, POWDER, FOR SOLUTION INTRAVENOUS at 02:01

## 2023-01-17 RX ADMIN — ALUMINUM HYDROXIDE, MAGNESIUM HYDROXIDE, AND SIMETHICONE 30 ML: 200; 200; 20 SUSPENSION ORAL at 02:01

## 2023-01-17 RX ADMIN — LIDOCAINE HYDROCHLORIDE 15 ML: 20 SOLUTION ORAL; TOPICAL at 02:01

## 2023-01-17 NOTE — ED PROVIDER NOTES
"Encounter Date: 1/17/2023       History     Chief Complaint   Patient presents with    Abdominal Pain     Epigastric pain which awoke pt from sleep. States tums have not helped and it feels like it "wraps around into  her back     HPI  Patient is a pleasant 42-year-old female with history of kidney stones who presents for epigastric abdominal pain.  Pain woke the patient up from sleep before arrival to the emergency department.  She states it is located in the epigastrium.  She states that it maybe radiates slightly to her back and her right upper quadrant.  Associated with nausea but no vomiting, fevers, chills, dysuria, constipation, diarrhea.  Patient reports still having normal bowel movements and passing gas.  She tried to take Tums and Pepcid without improvement.  She denies extremity weakness or numbness/tingling.  She denies chest pain, shortness of breath.    Review of patient's allergies indicates:   Allergen Reactions    Percocet [oxycodone-acetaminophen]      Past Medical History:   Diagnosis Date    Abnormal Pap smear of cervix     Depression     Head ache     Hives     Kidney stones     Urticaria      Past Surgical History:   Procedure Laterality Date    KIDNEY STONE SURGERY      WISDOM TOOTH EXTRACTION       Family History   Problem Relation Age of Onset    Hyperlipidemia Mother     Hyperlipidemia Father     Breast cancer Paternal Grandmother         Dx > 50 years    Colon cancer Paternal Grandmother     Ovarian cancer Neg Hx     Cancer Neg Hx      Social History     Tobacco Use    Smoking status: Never    Smokeless tobacco: Never   Substance Use Topics    Alcohol use: Not Currently     Comment: Rare    Drug use: No     Review of Systems  Please see pertinent review of systems and HPI.    Physical Exam     Initial Vitals [01/17/23 0128]   BP Pulse Resp Temp SpO2   (!) 161/103 82 18 98.5 °F (36.9 °C) 98 %      MAP       --         Physical Exam  Constitutional: No acute distress, appears mildly " uncomfortable due to pain but overall well-appearing  Respiratory: Non-labored, lungs clear  Cardiovascular: Well perfused, normal rate, regular rhythm  Gastrointestinal: Soft, nondistended, tender to palpation in the epigastrium without rebound or guarding  Integumentary: Warm and dry  Musculoskeletal: No deformity  Neurological: Awake and alert, normal motor and sensation all extremities, cranial nerves 2-12 grossly intact  Psychiatric: Cooperative     ED Course   Procedures  Labs Reviewed   CBC W/ AUTO DIFFERENTIAL - Abnormal; Notable for the following components:       Result Value    MCH 31.1 (*)     Gran # (ANC) 8.1 (*)     Gran % 73.6 (*)     All other components within normal limits    Narrative:     Release to patient->Immediate   COMPREHENSIVE METABOLIC PANEL - Abnormal; Notable for the following components:    Glucose 115 (*)     BUN 21 (*)     All other components within normal limits    Narrative:     Release to patient->Immediate   URINALYSIS, REFLEX TO URINE CULTURE - Abnormal; Notable for the following components:    Appearance, UA Hazy (*)     Leukocytes, UA 1+ (*)     All other components within normal limits    Narrative:     Specimen Source->Urine   URINALYSIS MICROSCOPIC - Abnormal; Notable for the following components:    WBC, UA 7 (*)     Bacteria Many (*)     Hyaline Casts, UA 3 (*)     All other components within normal limits    Narrative:     Specimen Source->Urine   HIV 1 / 2 ANTIBODY    Narrative:     Release to patient->Immediate   HEPATITIS C ANTIBODY    Narrative:     Release to patient->Immediate   LIPASE    Narrative:     Release to patient->Immediate   POCT URINE PREGNANCY          Imaging Results              CT Abdomen Pelvis With Contrast (Final result)  Result time 01/17/23 03:52:08      Final result by Brandon Mendieta MD (01/17/23 03:52:08)                   Impression:      1. Cholelithiasis without evidence of acute cholecystitis.  2. Hepatic steatosis.  Consider  correlation with LFTs.  3. Mild splenomegaly.  4. Nonobstructing nephrolith noted on prior CT is not visualized on this examination.  No evidence of obstructive uropathy.  5.  Additional findings as above.    Electronically signed by resident: Chris Alarcon  Date:    01/17/2023  Time:    02:51    Electronically signed by: Brandon Mendieta MD  Date:    01/17/2023  Time:    03:52               Narrative:    EXAMINATION:  CT ABDOMEN PELVIS WITH CONTRAST    CLINICAL HISTORY:  Epigastric pain;    TECHNIQUE:  Low dose axial images were obtained from the lung bases to the pubic symphysis following the intravenous administration of 100 cc of Omnipaque 350.  Sagittal and coronal reformats were provided.    COMPARISON:  CT renal stone 03/22/2022.    FINDINGS:  Heart: Normal in size.  No pericardial effusion.    Lung bases: Bandlike subsegmental atelectasis versus scarring of the lingula and left lung base.  No large focal consolidation, pleural effusion, or pneumothorax.    Liver: Normal in size.  Diffusely decreased parenchymal attenuation consistent with steatosis.  No focal hepatic abnormality.  Portal vein is patent.    Gallbladder: Calcified gallstone at the gallbladder neck.  No gallbladder wall thickening, distension, or pericholecystic fluid to suggest acute cholecystitis.    Bile Ducts: No intra or extrahepatic biliary ductal dilation.    Pancreas: No pancreatic mass lesion or peripancreatic inflammatory change.    Spleen: Mildly enlarged.    Adrenals: Unremarkable.    Kidneys/ Ureters: Normal in size and location.  Kidneys enhance normally.  No focal renal abnormality or nephrolithiasis.  No hydroureteronephrosis.    Bladder: Smooth contours without bladder wall thickening.    Reproductive organs: Unremarkable.    GI Tract/Mesentery: The stomach is unremarkable.  Visualized loops of small and large bowel are normal in caliber without evidence for obstruction or inflammation. Appendix is not visualized.  No  inflammatory change in the expected region of the appendix.    Peritoneal Space: No intraperitoneal free fluid or free air. No pathologically enlarged lymph nodes.    Retroperitoneum: No significant adenopathy.    Abdominal wall/extraperitoneal soft tissues: Small fat-containing umbilical hernia.    Vasculature: Abdominal aorta is normal in caliber, contour, and course without significant calcific atherosclerosis.    Bones: No acute fracture or bone destructive process.  L5 limbus vertebra, similar to prior.                                       Medications   sucralfate 100 mg/mL suspension 1 g (has no administration in time range)   aluminum-magnesium hydroxide-simethicone 200-200-20 mg/5 mL suspension 30 mL (has no administration in time range)   pantoprazole injection 40 mg (40 mg Intravenous Given 1/17/23 0211)   aluminum-magnesium hydroxide-simethicone 200-200-20 mg/5 mL suspension 30 mL (30 mLs Oral Given 1/17/23 0216)     And   LIDOcaine HCl 2% oral solution 15 mL (15 mLs Oral Given 1/17/23 0218)   fentaNYL 50 mcg/mL injection 50 mcg (50 mcg Intravenous Given 1/17/23 0207)   iohexoL (OMNIPAQUE 350) injection 100 mL (100 mLs Intravenous Given 1/17/23 0236)     Medical Decision Making:   History:   Old Records Summarized: records from clinic visits.  Clinical Tests:   Lab Tests: Ordered and Reviewed  Radiological Study: Ordered and Reviewed  Medical Tests: Ordered and Reviewed  ED Management:  Patient is a pleasant 42-year-old female who presents for epigastric abdominal pain.  Onset this evening prior to arrival.  She is tender in the upper abdomen especially in the epigastrium without rebound or guarding.  No peritoneal signs.  She is mildly hypertensive but is well-perfused and neurologically intact.  She is afebrile and the rest of her vitals are reassuring.  Labs and CT imaging ordered to assess for acute intra-abdominal pathologies like pancreatitis, biliary disease.  Symptoms also could be related to  reflux.  EKG with normal sinus rhythm, no ischemic changes.  Will treat symptomatically in trial GI cocktail and PPI as well as trial an opioid.  Dispo pending labs, imaging and re-evaluation.    On re-evaluation, patient resting comfortably.  Pain improved.  She declined additional pain medications here in the emergency department.  Labs reviewed as well as CT imaging.  CT showing cholelithiasis without cholecystitis.  Symptoms appear consistent with biliary colic.  Patient feels comfortable with discharge home at this time.  Will prescribe pain control and nausea medicines for home.  She was provided general surgery referral.  She was also instructed to follow up with her primary care doctor.  Patient was given strict return precautions prior to discharge.           ED Course as of 01/17/23 0445   Tue Jan 17, 2023   0141 EKG with normal sinus rhythm, rate 84, normal intervals, no STEMI. [NN]      ED Course User Index  [NN] Sandi Fink MD                 Clinical Impression:   Final diagnoses:  [R10.13] Epigastric abdominal pain  [R10.13] Epigastric pain  [K80.20] Calculus of gallbladder without cholecystitis without obstruction (Primary)  [K80.50] Biliary colic        ED Disposition Condition    Discharge Stable          ED Prescriptions       Medication Sig Dispense Start Date End Date Auth. Provider    HYDROcodone-acetaminophen (NORCO) 5-325 mg per tablet Take 1 tablet by mouth every 6 (six) hours as needed for Pain. 12 tablet 1/17/2023 -- Sandi Fink MD    ondansetron (ZOFRAN) 4 MG tablet Take 1 tablet (4 mg total) by mouth every 6 (six) hours as needed for Nausea. 12 tablet 1/17/2023 -- Sandi Fink MD          Follow-up Information       Follow up With Specialties Details Why Contact Info Additional Information    Christiano Hurst MD Internal Medicine Schedule an appointment as soon as possible for a visit   [address]  [facility] [address]  [phone]       [facility] -  Emergency Dept Emergency Medicine  As needed, If symptoms worsen [address]  [facility] [id number] [phone]     [facility] Schedule an appointment as soon as possible for a visit   [address]  [facility] [id number] [phone] [facility] - Main Building, 2nd Floor Please park in [facility] and use escalator or Clinic elevator             Sandi Fink MD  01/17/23 5270

## 2023-01-17 NOTE — PROVIDER PROGRESS NOTES - EMERGENCY DEPT.
Encounter Date: 1/17/2023    ED Physician Progress Notes         EKG - STEMI Decision  Initial Reading: No STEMI present.

## 2023-01-17 NOTE — ED TRIAGE NOTES
"Kelly Thomason, a 42 y.o. female presents to the ED w/ complaint of 8/10 epigastric pain that woke pt out of sleep around 2330. Pt reports having BM and belching without relief. Pt took tums, zantac, and ASA PTA.     Triage note:  Chief Complaint   Patient presents with    Abdominal Pain     Epigastric pain which awoke pt from sleep. States tums have not helped and it feels like it "wraps around into  her back     Review of patient's allergies indicates:   Allergen Reactions    Percocet [oxycodone-acetaminophen]      Past Medical History:   Diagnosis Date    Abnormal Pap smear of cervix     Depression     Head ache     Hives     Kidney stones     Urticaria       "

## 2023-01-17 NOTE — DISCHARGE INSTRUCTIONS
Please call to schedule follow-up appointment with General surgery.  He should also follow-up with your primary care doctor.    You can take the Norco as prescribed for pain.  Alternatively you can take ibuprofen.  You have also been prescribed Zofran as needed for nausea and vomiting at home.    If you develop worsening symptoms, fevers, chills, inability to stay hydrated at home despite taking Zofran or worsening abdominal pain, please return to the emergency department for re-evaluation.

## 2023-01-17 NOTE — LETTER
January 17, 2023        Christiano Hurst MD  [address]  [facility] [address]             [facility] 2nd Fl  [address]  [facility] [address]  Phone: [phone]   Patient: Kelly Thomason   MR Number: [id number]   YOB: 1980   Date of Visit: 1/17/2023       Dear Dr. Hurst:    Thank you for referring Kelly Thomason to me for evaluation. Attached you will find relevant portions of my assessment and plan of care.    If you have questions, please do not hesitate to call me. I look forward to following Kelly Thomason along with you.    Sincerely,      Eliecer Gong MD            CC    No Recipients    Enclosure

## 2023-01-18 ENCOUNTER — TELEPHONE (OUTPATIENT)
Dept: SURGERY | Facility: CLINIC | Age: 43
End: 2023-01-18
Payer: COMMERCIAL

## 2023-01-18 ENCOUNTER — PATIENT MESSAGE (OUTPATIENT)
Dept: SURGERY | Facility: CLINIC | Age: 43
End: 2023-01-18
Payer: COMMERCIAL

## 2023-01-18 NOTE — PROGRESS NOTES
The patient location is: home  The chief complaint leading to consultation is: gallstones    Visit type: audiovisual      24 minutes of total time spent on the encounter, which includes face to face time and non-face to face time preparing to see the patient (eg, review of tests), Obtaining and/or reviewing separately obtained history, Documenting clinical information in the electronic or other health record, Independently interpreting results (not separately reported) and communicating results to the patient/family/caregiver, or Care coordination (not separately reported).         Each patient to whom he or she provides medical services by telemedicine is:  (1) informed of the relationship between the physician and patient and the respective role of any other health care provider with respect to management of the patient; and (2) notified that he or she may decline to receive medical services by telemedicine and may withdraw from such care at any time.    Notes:     History & Physical    SUBJECTIVE:     History of Present Illness:  Patient is a 42 y.o. female presents with bmi 36.36 and gallstones.  She has recurring epigastric pain associated with bloating.  This happened last week but it wasn't as bad.  She thought it was indigestion and took tums.  It recurred last night.  The pain was more severe.  She took tums without relief and the pain got worse.  She took asa. This didn't help.  The pain radiated to her back and was associated with nausea.  She went to the ER.  She says the pain resolved by the next morning.  She has no tenderness when she pushes on her stomach today but has some bloating.    She has a family history of gallstones and an uncle  from complication stemming from cbd stone.    She is a  for the [employer].    No chief complaint on file.      Review of patient's allergies indicates:   Allergen Reactions    Percocet [oxycodone-acetaminophen]        Current  Outpatient Medications   Medication Sig Dispense Refill    galcanezumab-gnlm 120 mg/mL PnIj Inject 120 mg into the skin every 28 days. Begin 28 days after loading dose. 1 mL 10    HYDROcodone-acetaminophen (NORCO) 5-325 mg per tablet Take 1 tablet by mouth every 6 (six) hours as needed for Pain. 12 tablet 0    hydrOXYzine HCL (ATARAX) 10 MG Tab       ondansetron (ZOFRAN) 4 MG tablet Take 1 tablet (4 mg total) by mouth every 6 (six) hours as needed for Nausea. 12 tablet 0    sertraline (ZOLOFT) 100 MG tablet TAKE 1 TABLET BY MOUTH ONCE DAILY 90 tablet 3    sumatriptan (IMITREX) 100 MG tablet Take 1 tab at onset of migraine, can repeat in 2 hrs if needed. Max 2 tabs/day. Max 3 days/week. 27 tablet 3    ubrogepant (UBRELVY) 100 mg tablet Take 1 tablet (100 mg total) by mouth every 2 (two) hours as needed for Migraine. Max 200 mg/day. 10 tablet 2     Current Facility-Administered Medications   Medication Dose Route Frequency Provider Last Rate Last Admin    etonogestreL subdermal device 68 mg  68 mg   Paola Tony NP   68 mg at 05/21/20 1040       Past Medical History:   Diagnosis Date    Abnormal Pap smear of cervix     Depression     Head ache     Hives     Kidney stones     Urticaria      Past Surgical History:   Procedure Laterality Date    KIDNEY STONE SURGERY      WISDOM TOOTH EXTRACTION       Family History   Problem Relation Age of Onset    Hyperlipidemia Mother     Hyperlipidemia Father     Breast cancer Paternal Grandmother         Dx > 50 years    Colon cancer Paternal Grandmother     Ovarian cancer Neg Hx     Cancer Neg Hx      Social History     Tobacco Use    Smoking status: Never    Smokeless tobacco: Never   Substance Use Topics    Alcohol use: Not Currently     Comment: Rare    Drug use: No        Review of Systems:  Review of Systems   Constitutional:  Negative for fever.   Respiratory:  Negative for chest tightness and shortness of breath.    Cardiovascular:  Negative for chest pain.    Gastrointestinal:  Negative for constipation and diarrhea.   Genitourinary:  Negative for dysuria.   Hematological:  Does not bruise/bleed easily.     OBJECTIVE:     Vital Signs (Most Recent)              Physical Exam:  Physical Exam    Laboratory  CBC: Reviewed  CMP: Reviewed  These were ok  UA positive  Lipase normal    Diagnostic Results:  Ct reviewed, films viewed    1. Cholelithiasis without evidence of acute cholecystitis.    2. Hepatic steatosis.  Consider correlation with LFTs.    3. Mild splenomegaly.    4. Nonobstructing nephrolith noted on prior CT is not visualized on this examination.  No evidence of obstructive uropathy.    5.  Additional findings in ct report.  Ekg reviewed, normal    ASSESSMENT/PLAN:     Biliary colic, pain lasting 12-14 hours so it could be chronic lesly.    PLAN:       For lap lesly.  Bariatric liquid diet for a week preop.  I let her know her UA is positive and she can follow up with her pcp.

## 2023-01-18 NOTE — TELEPHONE ENCOUNTER
Left VM for pt to return phone call to discuss potential surgery dates and go over preop instructions.

## 2023-01-18 NOTE — TELEPHONE ENCOUNTER
----- Message from Eliecer Gong MD sent at 1/17/2023  6:22 PM CST -----  Please set her up for [facility] soon.  Bariatric liquid diet for a week preop.  She is going to [place] 2/9/23 and back just prior to [facility].    Please have her follow up with her pcp regarding her positive ua from the ER.

## 2023-01-18 NOTE — H&P (VIEW-ONLY)
The patient location is: home  The chief complaint leading to consultation is: gallstones    Visit type: audiovisual      24 minutes of total time spent on the encounter, which includes face to face time and non-face to face time preparing to see the patient (eg, review of tests), Obtaining and/or reviewing separately obtained history, Documenting clinical information in the electronic or other health record, Independently interpreting results (not separately reported) and communicating results to the patient/family/caregiver, or Care coordination (not separately reported).         Each patient to whom he or she provides medical services by telemedicine is:  (1) informed of the relationship between the physician and patient and the respective role of any other health care provider with respect to management of the patient; and (2) notified that he or she may decline to receive medical services by telemedicine and may withdraw from such care at any time.    Notes:     History & Physical    SUBJECTIVE:     History of Present Illness:  Patient is a 42 y.o. female presents with bmi 36.36 and gallstones.  She has recurring epigastric pain associated with bloating.  This happened last week but it wasn't as bad.  She thought it was indigestion and took tums.  It recurred last night.  The pain was more severe.  She took tums without relief and the pain got worse.  She took asa. This didn't help.  The pain radiated to her back and was associated with nausea.  She went to the ER.  She says the pain resolved by the next morning.  She has no tenderness when she pushes on her stomach today but has some bloating.    She has a family history of gallstones and an uncle  from complication stemming from cbd stone.    She is a  for the [employer].    No chief complaint on file.      Review of patient's allergies indicates:   Allergen Reactions    Percocet [oxycodone-acetaminophen]        Current  Outpatient Medications   Medication Sig Dispense Refill    galcanezumab-gnlm 120 mg/mL PnIj Inject 120 mg into the skin every 28 days. Begin 28 days after loading dose. 1 mL 10    HYDROcodone-acetaminophen (NORCO) 5-325 mg per tablet Take 1 tablet by mouth every 6 (six) hours as needed for Pain. 12 tablet 0    hydrOXYzine HCL (ATARAX) 10 MG Tab       ondansetron (ZOFRAN) 4 MG tablet Take 1 tablet (4 mg total) by mouth every 6 (six) hours as needed for Nausea. 12 tablet 0    sertraline (ZOLOFT) 100 MG tablet TAKE 1 TABLET BY MOUTH ONCE DAILY 90 tablet 3    sumatriptan (IMITREX) 100 MG tablet Take 1 tab at onset of migraine, can repeat in 2 hrs if needed. Max 2 tabs/day. Max 3 days/week. 27 tablet 3    ubrogepant (UBRELVY) 100 mg tablet Take 1 tablet (100 mg total) by mouth every 2 (two) hours as needed for Migraine. Max 200 mg/day. 10 tablet 2     Current Facility-Administered Medications   Medication Dose Route Frequency Provider Last Rate Last Admin    etonogestreL subdermal device 68 mg  68 mg   Paola Tony NP   68 mg at 05/21/20 1040       Past Medical History:   Diagnosis Date    Abnormal Pap smear of cervix     Depression     Head ache     Hives     Kidney stones     Urticaria      Past Surgical History:   Procedure Laterality Date    KIDNEY STONE SURGERY      WISDOM TOOTH EXTRACTION       Family History   Problem Relation Age of Onset    Hyperlipidemia Mother     Hyperlipidemia Father     Breast cancer Paternal Grandmother         Dx > 50 years    Colon cancer Paternal Grandmother     Ovarian cancer Neg Hx     Cancer Neg Hx      Social History     Tobacco Use    Smoking status: Never    Smokeless tobacco: Never   Substance Use Topics    Alcohol use: Not Currently     Comment: Rare    Drug use: No        Review of Systems:  Review of Systems   Constitutional:  Negative for fever.   Respiratory:  Negative for chest tightness and shortness of breath.    Cardiovascular:  Negative for chest pain.    Gastrointestinal:  Negative for constipation and diarrhea.   Genitourinary:  Negative for dysuria.   Hematological:  Does not bruise/bleed easily.     OBJECTIVE:     Vital Signs (Most Recent)              Physical Exam:  Physical Exam    Laboratory  CBC: Reviewed  CMP: Reviewed  These were ok  UA positive  Lipase normal    Diagnostic Results:  Ct reviewed, films viewed    1. Cholelithiasis without evidence of acute cholecystitis.    2. Hepatic steatosis.  Consider correlation with LFTs.    3. Mild splenomegaly.    4. Nonobstructing nephrolith noted on prior CT is not visualized on this examination.  No evidence of obstructive uropathy.    5.  Additional findings in ct report.  Ekg reviewed, normal    ASSESSMENT/PLAN:     Biliary colic, pain lasting 12-14 hours so it could be chronic lesly.    PLAN:       For lap lesly.  Bariatric liquid diet for a week preop.  I let her know her UA is positive and she can follow up with her pcp.

## 2023-01-19 ENCOUNTER — PATIENT MESSAGE (OUTPATIENT)
Dept: SURGERY | Facility: CLINIC | Age: 43
End: 2023-01-19
Payer: COMMERCIAL

## 2023-01-23 ENCOUNTER — HOSPITAL ENCOUNTER (OUTPATIENT)
Dept: RADIOLOGY | Facility: HOSPITAL | Age: 43
Discharge: HOME | End: 2023-01-23
Attending: OBSTETRICS & GYNECOLOGY
Payer: COMMERCIAL

## 2023-01-23 DIAGNOSIS — Z12.31 BREAST CANCER SCREENING BY MAMMOGRAM: ICD-10-CM

## 2023-01-23 PROCEDURE — 77067 SCR MAMMO BI INCL CAD: CPT | Mod: 26,,, | Performed by: RADIOLOGY

## 2023-01-23 PROCEDURE — 77063 BREAST TOMOSYNTHESIS BI: CPT | Mod: 26,,, | Performed by: RADIOLOGY

## 2023-01-23 PROCEDURE — 77063 MAMMO DIGITAL SCREENING BILAT WITH TOMO: ICD-10-PCS | Mod: 26,,, | Performed by: RADIOLOGY

## 2023-01-23 PROCEDURE — 77067 SCR MAMMO BI INCL CAD: CPT | Mod: TC

## 2023-01-23 PROCEDURE — 77067 MAMMO DIGITAL SCREENING BILAT WITH TOMO: ICD-10-PCS | Mod: 26,,, | Performed by: RADIOLOGY

## 2023-01-24 ENCOUNTER — TELEPHONE (OUTPATIENT)
Dept: SURGERY | Facility: CLINIC | Age: 43
End: 2023-01-24
Payer: COMMERCIAL

## 2023-01-24 ENCOUNTER — ANESTHESIA EVENT (OUTPATIENT)
Dept: SURGERY | Facility: HOSPITAL | Age: 43
End: 2023-01-24
Payer: COMMERCIAL

## 2023-01-24 NOTE — TELEPHONE ENCOUNTER
Pt expressed she will be dropping of her daughter at school tomorrow and she might be 5-10 min late. She will try to make it on time but wanted to left office know just incase.

## 2023-01-24 NOTE — ANESTHESIA PREPROCEDURE EVALUATION
[facility]  Anesthesia Pre-Operative Evaluation         Patient Name: Kelly Thomason  YOB: 1980  MRN: [id number]    SUBJECTIVE:     Pre-operative evaluation for Procedure(s) (LRB):  CHOLECYSTECTOMY, LAPAROSCOPIC (N/A)     01/24/2023    Kelly Thomason is a 42 y.o. female w/ a significant PMHx of depression, headaches, obesity, and gallstones.    Patient now presents for the above procedure(s).      Prev airway: None documented.      Patient Active Problem List   Diagnosis    Depression    Migraine without aura and without status migrainosus, not intractable    Neck pain    Poor posture    Decreased ROM of intervertebral discs of cervical spine       Review of patient's allergies indicates:   Allergen Reactions    Percocet [oxycodone-acetaminophen] Other (See Comments)     Caused mobility problems       Current Inpatient Medications:   etonogestreL  68 mg         Current Facility-Administered Medications on File Prior to Encounter   Medication Dose Route Frequency Provider Last Rate Last Admin    etonogestreL subdermal device 68 mg  68 mg   Paola Tony NP   68 mg at 05/21/20 1040     Current Outpatient Medications on File Prior to Encounter   Medication Sig Dispense Refill    galcanezumab-gnlm 120 mg/mL PnIj Inject 120 mg into the skin every 28 days. Begin 28 days after loading dose. 1 mL 10    nitrofurantoin, macrocrystal-monohydrate, (MACROBID) 100 MG capsule Take 1 capsule (100 mg total) by mouth 2 (two) times daily. 14 capsule 0    sertraline (ZOLOFT) 100 MG tablet TAKE 1 TABLET BY MOUTH ONCE DAILY (Patient taking differently: every evening.) 90 tablet 3    HYDROcodone-acetaminophen (NORCO) 5-325 mg per tablet Take 1 tablet by mouth every 6 (six) hours as needed for Pain. 12 tablet 0    ondansetron (ZOFRAN) 4 MG tablet Take 1 tablet (4 mg total) by mouth every 6 (six) hours as needed for Nausea. 12 tablet 0    sumatriptan (IMITREX) 100 MG tablet Take 1 tab at  onset of migraine, can repeat in 2 hrs if needed. Max 2 tabs/day. Max 3 days/week. 27 tablet 3    ubrogepant (UBRELVY) 100 mg tablet Take 1 tablet (100 mg total) by mouth every 2 (two) hours as needed for Migraine. Max 200 mg/day. 10 tablet 2    [DISCONTINUED] alprazolam (XANAX) 0.25 MG tablet Take 1 tablet (0.25 mg total) by mouth nightly as needed. 20 tablet 0       Past Surgical History:   Procedure Laterality Date    KIDNEY STONE SURGERY      WISDOM TOOTH EXTRACTION         Social History     Socioeconomic History    Marital status: Single   Tobacco Use    Smoking status: Never    Smokeless tobacco: Never   Substance and Sexual Activity    Alcohol use: Not Currently     Comment: Rare    Drug use: No    Sexual activity: Not Currently     Partners: Male     Birth control/protection: Implant   Social History Narrative    Works as     Single mom    Daughter: [place]os[place]     Social Determinants of Health     Financial Resource Strain: Low Risk     Difficulty of Paying Living Expenses: Not hard at all   Food Insecurity: No Food Insecurity    Worried About Running Out of Food in the Last Year: Never true    Ran Out of Food in the Last Year: Never true   Transportation Needs: No Transportation Needs    Lack of Transportation (Medical): No    Lack of Transportation (Non-Medical): No   Physical Activity: Insufficiently Active    Days of Exercise per Week: 2 days    Minutes of Exercise per Session: 30 min   Stress: No Stress Concern Present    Feeling of Stress : Only a little   Social Connections: Unknown    Frequency of Communication with Friends and Family: Three times a week    Frequency of Social Gatherings with Friends and Family: Twice a week    Active Member of Clubs or Organizations: Yes    Attends Club or Organization Meetings: More than 4 times per year    Marital Status: Never    Housing Stability: Low Risk     Unable to Pay for Housing in the Last Year: No     Number of Places Lived in the Last Year: 1    Unstable Housing in the Last Year: No       OBJECTIVE:     Vital Signs Range (Last 24H):         Significant Labs:  Lab Results   Component Value Date    WBC 10.98 01/17/2023    HGB 14.2 01/17/2023    HCT 41.9 01/17/2023     01/17/2023    CHOL 182 03/07/2022    TRIG 165 (H) 03/07/2022    HDL 53 03/07/2022    ALT 34 01/17/2023    AST 22 01/17/2023     01/17/2023    K 4.0 01/17/2023     01/17/2023    CREATININE 0.8 01/17/2023    BUN 21 (H) 01/17/2023    CO2 23 01/17/2023    TSH 2.012 01/22/2021    HGBA1C 5.5 03/07/2022       Diagnostic Studies: No relevant studies.    EKG:   Results for orders placed or performed during the hospital encounter of 01/17/23   EKG 12-lead    Collection Time: 01/17/23  1:27 AM    Narrative    Test Reason : R10.13,    Vent. Rate : 084 BPM     Atrial Rate : 084 BPM     P-R Int : 170 ms          QRS Dur : 086 ms      QT Int : 402 ms       P-R-T Axes : 066 075 040 degrees     QTc Int : 475 ms    Normal sinus rhythm  Normal ECG  No previous ECGs available  Confirmed by Zoltan KIRBY MD ([id number]) on 1/17/2023 8:44:12 AM    Referred By: [email or web address]   SELF           Confirmed By:Zoltan KIRBY MD       2D ECHO:  TTE:  No results found for this or any previous visit.    SANTIAGO:  No results found for this or any previous visit.    ASSESSMENT/PLAN:                                                                                                                  01/24/2023  Kelly Thomason is a 42 y.o., female.      Pre-op Assessment    I have reviewed the Patient Summary Reports.     I have reviewed the Nursing Notes. I have reviewed the NPO Status.   I have reviewed the Medications.     Review of Systems  Anesthesia Hx:  Very sore throat post-op.  Dysphagia for days after. History of prior surgery of interest to airway management or planning: Previous anesthesia: General [facility] with general anesthesia.  Denies Family Hx of Anesthesia  complications.  Personal Hx of Anesthesia complications  Difficult Intubation, according to patient history  Severe Sore Throat after Anesthesia   Social:  Non-Smoker, Social Alcohol Use    Hematology/Oncology:  Hematology Normal   Oncology Normal    -- Anemia: Iron Deficiency Anemia  Chronic    EENT/Dental:EENT/Dental Normal   Cardiovascular:   Exercise tolerance: good  Functional Capacity good / => 4 METS    Pulmonary:  Pulmonary Normal    Renal/:   Chronic Renal Disease  Renal Symptoms/Infections/Stones:  Urolithiasis, s/p cysto procedure, left kidney, right kidney Denies Kidney Function/Disease    Hepatic/GI:  Hepatic/GI Normal    Musculoskeletal:  Musculoskeletal Normal    Neurological:   Headaches    Endocrine:  Endocrine Normal    Dermatological:  Skin Normal    Psych:   depression  Anxiety Disorder and Past Hx of Anxiety.  Depression and Chronic Depression, Treated.             Anesthesia Plan  Type of Anesthesia, risks & benefits discussed:    Anesthesia Type: Gen ETT  Intra-op Monitoring Plan: Standard ASA Monitors  Post Op Pain Control Plan: multimodal analgesia  Induction:  IV  Airway Plan: Direct, Post-Induction  Informed Consent: Informed consent signed with the Patient and all parties understand the risks and agree with anesthesia plan.  All questions answered.   ASA Score: 2  Day of Surgery Review of History & Physical: H&P Update referred to the surgeon/provider.    Ready For Surgery From Anesthesia Perspective.     .

## 2023-01-25 ENCOUNTER — ANESTHESIA (OUTPATIENT)
Dept: SURGERY | Facility: HOSPITAL | Age: 43
End: 2023-01-25
Payer: COMMERCIAL

## 2023-01-25 ENCOUNTER — HOSPITAL ENCOUNTER (OUTPATIENT)
Facility: HOSPITAL | Age: 43
Discharge: HOME | End: 2023-01-25
Attending: SURGERY | Admitting: SURGERY
Payer: COMMERCIAL

## 2023-01-25 VITALS
RESPIRATION RATE: 13 BRPM | HEART RATE: 89 BPM | SYSTOLIC BLOOD PRESSURE: 116 MMHG | WEIGHT: 178 LBS | BODY MASS INDEX: 35.88 KG/M2 | HEIGHT: 59 IN | OXYGEN SATURATION: 94 % | TEMPERATURE: 98 F | DIASTOLIC BLOOD PRESSURE: 68 MMHG

## 2023-01-25 DIAGNOSIS — K80.20 CHOLELITHIASIS: ICD-10-CM

## 2023-01-25 DIAGNOSIS — K80.20 CALCULUS OF GALLBLADDER WITHOUT CHOLECYSTITIS WITHOUT OBSTRUCTION: Primary | ICD-10-CM

## 2023-01-25 LAB
B-HCG UR QL: NEGATIVE
CTP QC/QA: YES

## 2023-01-25 PROCEDURE — 25000003 PHARM REV CODE 250

## 2023-01-25 PROCEDURE — 37000008 HC ANESTHESIA 1ST 15 MINUTES: Performed by: SURGERY

## 2023-01-25 PROCEDURE — 63600175 PHARM REV CODE 636 W HCPCS

## 2023-01-25 PROCEDURE — 25000003 PHARM REV CODE 250: Performed by: SURGERY

## 2023-01-25 PROCEDURE — 47562 PR LAP,CHOLECYSTECTOMY: ICD-10-PCS | Mod: ,,, | Performed by: SURGERY

## 2023-01-25 PROCEDURE — 63600175 PHARM REV CODE 636 W HCPCS: Performed by: ANESTHESIOLOGY

## 2023-01-25 PROCEDURE — 71000016 HC POSTOP RECOV ADDL HR: Performed by: SURGERY

## 2023-01-25 PROCEDURE — 81025 URINE PREGNANCY TEST: CPT | Performed by: SURGERY

## 2023-01-25 PROCEDURE — D9220A PRA ANESTHESIA: Mod: ,,, | Performed by: ANESTHESIOLOGY

## 2023-01-25 PROCEDURE — 36000709 HC OR TIME LEV III EA ADD 15 MIN: Performed by: SURGERY

## 2023-01-25 PROCEDURE — 27201423 OPTIME MED/SURG SUP & DEVICES STERILE SUPPLY: Performed by: SURGERY

## 2023-01-25 PROCEDURE — 36000708 HC OR TIME LEV III 1ST 15 MIN: Performed by: SURGERY

## 2023-01-25 PROCEDURE — 37000009 HC ANESTHESIA EA ADD 15 MINS: Performed by: SURGERY

## 2023-01-25 PROCEDURE — 71000015 HC POSTOP RECOV 1ST HR: Performed by: SURGERY

## 2023-01-25 PROCEDURE — 47562 LAPAROSCOPIC CHOLECYSTECTOMY: CPT | Mod: ,,, | Performed by: SURGERY

## 2023-01-25 PROCEDURE — 88304 TISSUE EXAM BY PATHOLOGIST: CPT | Mod: 26,,, | Performed by: STUDENT IN AN ORGANIZED HEALTH CARE EDUCATION/TRAINING PROGRAM

## 2023-01-25 PROCEDURE — 71000044 HC DOSC ROUTINE RECOVERY FIRST HOUR: Performed by: SURGERY

## 2023-01-25 PROCEDURE — D9220A PRA ANESTHESIA: ICD-10-PCS | Mod: ,,, | Performed by: ANESTHESIOLOGY

## 2023-01-25 PROCEDURE — 88304 PR  SURG PATH,LEVEL III: ICD-10-PCS | Mod: 26,,, | Performed by: STUDENT IN AN ORGANIZED HEALTH CARE EDUCATION/TRAINING PROGRAM

## 2023-01-25 PROCEDURE — 88304 TISSUE EXAM BY PATHOLOGIST: CPT | Performed by: STUDENT IN AN ORGANIZED HEALTH CARE EDUCATION/TRAINING PROGRAM

## 2023-01-25 RX ORDER — DEXAMETHASONE SODIUM PHOSPHATE 4 MG/ML
INJECTION, SOLUTION INTRA-ARTICULAR; INTRALESIONAL; INTRAMUSCULAR; INTRAVENOUS; SOFT TISSUE
Status: DISCONTINUED | OUTPATIENT
Start: 2023-01-25 | End: 2023-01-25

## 2023-01-25 RX ORDER — FENTANYL CITRATE 50 UG/ML
INJECTION, SOLUTION INTRAMUSCULAR; INTRAVENOUS
Status: COMPLETED
Start: 2023-01-25 | End: 2023-01-25

## 2023-01-25 RX ORDER — POLYETHYLENE GLYCOL 3350 17 G/17G
17 POWDER, FOR SOLUTION ORAL DAILY
Refills: 0 | COMMUNITY
Start: 2023-01-25 | End: 2023-02-07

## 2023-01-25 RX ORDER — HYDROMORPHONE HYDROCHLORIDE 1 MG/ML
0.2 INJECTION, SOLUTION INTRAMUSCULAR; INTRAVENOUS; SUBCUTANEOUS EVERY 5 MIN PRN
Status: DISCONTINUED | OUTPATIENT
Start: 2023-01-25 | End: 2023-01-25 | Stop reason: HOSPADM

## 2023-01-25 RX ORDER — FENTANYL CITRATE 50 UG/ML
25 INJECTION, SOLUTION INTRAMUSCULAR; INTRAVENOUS EVERY 5 MIN PRN
Status: DISCONTINUED | OUTPATIENT
Start: 2023-01-25 | End: 2023-01-25 | Stop reason: HOSPADM

## 2023-01-25 RX ORDER — VASOPRESSIN 20 [USP'U]/ML
INJECTION, SOLUTION INTRAMUSCULAR; SUBCUTANEOUS
Status: DISCONTINUED | OUTPATIENT
Start: 2023-01-25 | End: 2023-01-25

## 2023-01-25 RX ORDER — EPHEDRINE SULFATE 50 MG/ML
INJECTION, SOLUTION INTRAVENOUS
Status: DISCONTINUED | OUTPATIENT
Start: 2023-01-25 | End: 2023-01-25

## 2023-01-25 RX ORDER — SODIUM CHLORIDE 9 MG/ML
INJECTION, SOLUTION INTRAVENOUS CONTINUOUS
Status: DISCONTINUED | OUTPATIENT
Start: 2023-01-25 | End: 2023-01-25 | Stop reason: HOSPADM

## 2023-01-25 RX ORDER — ROCURONIUM BROMIDE 10 MG/ML
INJECTION, SOLUTION INTRAVENOUS
Status: DISCONTINUED | OUTPATIENT
Start: 2023-01-25 | End: 2023-01-25

## 2023-01-25 RX ORDER — MIDAZOLAM HYDROCHLORIDE 1 MG/ML
INJECTION, SOLUTION INTRAMUSCULAR; INTRAVENOUS
Status: DISCONTINUED | OUTPATIENT
Start: 2023-01-25 | End: 2023-01-25

## 2023-01-25 RX ORDER — ACETAMINOPHEN 500 MG
1000 TABLET ORAL ONCE
Status: COMPLETED | OUTPATIENT
Start: 2023-01-25 | End: 2023-01-25

## 2023-01-25 RX ORDER — SODIUM CHLORIDE 0.9 % (FLUSH) 0.9 %
10 SYRINGE (ML) INJECTION
Status: DISCONTINUED | OUTPATIENT
Start: 2023-01-25 | End: 2023-01-25 | Stop reason: HOSPADM

## 2023-01-25 RX ORDER — ONDANSETRON 2 MG/ML
4 INJECTION INTRAMUSCULAR; INTRAVENOUS EVERY 12 HOURS PRN
Status: DISCONTINUED | OUTPATIENT
Start: 2023-01-25 | End: 2023-01-25 | Stop reason: HOSPADM

## 2023-01-25 RX ORDER — ONDANSETRON 2 MG/ML
INJECTION INTRAMUSCULAR; INTRAVENOUS
Status: DISCONTINUED | OUTPATIENT
Start: 2023-01-25 | End: 2023-01-25

## 2023-01-25 RX ORDER — BUPIVACAINE HYDROCHLORIDE 2.5 MG/ML
INJECTION, SOLUTION EPIDURAL; INFILTRATION; INTRACAUDAL
Status: DISCONTINUED | OUTPATIENT
Start: 2023-01-25 | End: 2023-01-25 | Stop reason: HOSPADM

## 2023-01-25 RX ORDER — DEXMEDETOMIDINE HYDROCHLORIDE 100 UG/ML
INJECTION, SOLUTION INTRAVENOUS
Status: DISCONTINUED | OUTPATIENT
Start: 2023-01-25 | End: 2023-01-25

## 2023-01-25 RX ORDER — PROPOFOL 10 MG/ML
VIAL (ML) INTRAVENOUS
Status: DISCONTINUED | OUTPATIENT
Start: 2023-01-25 | End: 2023-01-25

## 2023-01-25 RX ORDER — OXYCODONE HYDROCHLORIDE 5 MG/1
5 TABLET ORAL EVERY 4 HOURS PRN
Qty: 15 TABLET | Refills: 0 | Status: SHIPPED | OUTPATIENT
Start: 2023-01-25 | End: 2023-02-07

## 2023-01-25 RX ORDER — FENTANYL CITRATE 50 UG/ML
INJECTION, SOLUTION INTRAMUSCULAR; INTRAVENOUS
Status: DISCONTINUED | OUTPATIENT
Start: 2023-01-25 | End: 2023-01-25

## 2023-01-25 RX ORDER — HALOPERIDOL 5 MG/ML
0.5 INJECTION INTRAMUSCULAR EVERY 10 MIN PRN
Status: DISCONTINUED | OUTPATIENT
Start: 2023-01-25 | End: 2023-01-25 | Stop reason: HOSPADM

## 2023-01-25 RX ORDER — LIDOCAINE HYDROCHLORIDE 20 MG/ML
INJECTION, SOLUTION EPIDURAL; INFILTRATION; INTRACAUDAL; PERINEURAL
Status: DISCONTINUED | OUTPATIENT
Start: 2023-01-25 | End: 2023-01-25

## 2023-01-25 RX ADMIN — DEXMEDETOMIDINE HYDROCHLORIDE 8 MCG: 100 INJECTION, SOLUTION INTRAVENOUS at 09:01

## 2023-01-25 RX ADMIN — VASOPRESSIN 3 UNITS: 20 INJECTION INTRAVENOUS at 10:01

## 2023-01-25 RX ADMIN — PROPOFOL 200 MG: 10 INJECTION, EMULSION INTRAVENOUS at 09:01

## 2023-01-25 RX ADMIN — ONDANSETRON 4 MG: 2 INJECTION INTRAMUSCULAR; INTRAVENOUS at 10:01

## 2023-01-25 RX ADMIN — SODIUM CHLORIDE, SODIUM GLUCONATE, SODIUM ACETATE, POTASSIUM CHLORIDE, MAGNESIUM CHLORIDE, SODIUM PHOSPHATE, DIBASIC, AND POTASSIUM PHOSPHATE: .53; .5; .37; .037; .03; .012; .00082 INJECTION, SOLUTION INTRAVENOUS at 10:01

## 2023-01-25 RX ADMIN — HYDROMORPHONE HYDROCHLORIDE 0.2 MG: 1 INJECTION, SOLUTION INTRAMUSCULAR; INTRAVENOUS; SUBCUTANEOUS at 11:01

## 2023-01-25 RX ADMIN — DEXAMETHASONE SODIUM PHOSPHATE 8 MG: 4 INJECTION INTRA-ARTICULAR; INTRALESIONAL; INTRAMUSCULAR; INTRAVENOUS; SOFT TISSUE at 09:01

## 2023-01-25 RX ADMIN — ACETAMINOPHEN 1000 MG: 500 TABLET ORAL at 09:01

## 2023-01-25 RX ADMIN — PROPOFOL 50 MG: 10 INJECTION, EMULSION INTRAVENOUS at 09:01

## 2023-01-25 RX ADMIN — SUGAMMADEX 400 MG: 100 INJECTION, SOLUTION INTRAVENOUS at 10:01

## 2023-01-25 RX ADMIN — SODIUM CHLORIDE: 9 INJECTION, SOLUTION INTRAVENOUS at 09:01

## 2023-01-25 RX ADMIN — FENTANYL CITRATE 25 MCG: 50 INJECTION, SOLUTION INTRAMUSCULAR; INTRAVENOUS at 11:01

## 2023-01-25 RX ADMIN — CEFAZOLIN 2 G: 2 INJECTION, POWDER, FOR SOLUTION INTRAMUSCULAR; INTRAVENOUS at 09:01

## 2023-01-25 RX ADMIN — SODIUM CHLORIDE: 0.9 INJECTION, SOLUTION INTRAVENOUS at 09:01

## 2023-01-25 RX ADMIN — ONDANSETRON 4 MG: 2 INJECTION INTRAMUSCULAR; INTRAVENOUS at 09:01

## 2023-01-25 RX ADMIN — FENTANYL CITRATE 50 MCG: 50 INJECTION INTRAMUSCULAR; INTRAVENOUS at 10:01

## 2023-01-25 RX ADMIN — FENTANYL CITRATE 25 MCG: 50 INJECTION, SOLUTION INTRAMUSCULAR; INTRAVENOUS at 12:01

## 2023-01-25 RX ADMIN — VASOPRESSIN 1 UNITS: 20 INJECTION INTRAVENOUS at 10:01

## 2023-01-25 RX ADMIN — EPHEDRINE SULFATE 10 MG: 50 INJECTION INTRAVENOUS at 10:01

## 2023-01-25 RX ADMIN — ROCURONIUM BROMIDE 50 MG: 10 INJECTION INTRAVENOUS at 09:01

## 2023-01-25 RX ADMIN — MIDAZOLAM 2 MG: 1 INJECTION INTRAMUSCULAR; INTRAVENOUS at 09:01

## 2023-01-25 RX ADMIN — LIDOCAINE HYDROCHLORIDE 100 MG: 20 INJECTION, SOLUTION EPIDURAL; INFILTRATION; INTRACAUDAL; PERINEURAL at 09:01

## 2023-01-25 RX ADMIN — GLYCOPYRROLATE 0.2 MG: 0.2 INJECTION INTRAMUSCULAR; INTRAVENOUS at 10:01

## 2023-01-25 RX ADMIN — FENTANYL CITRATE 50 MCG: 50 INJECTION INTRAMUSCULAR; INTRAVENOUS at 09:01

## 2023-01-25 NOTE — OP NOTE
DATE OF PROCEDURE: 1/25/2023    DIAGNOSIS: Calculus of gallbladder without cholecystitis without obstruction [K80.20]    PROCEDURE: Procedure(s) (LRB):  CHOLECYSTECTOMY, LAPAROSCOPIC (N/A)    Surgeon(s) and Role:     * Eliecer Gong MD - Primary     * Gustavo Raines MD - Resident - Assisting    ANESTHESIA: General.   PROCEDURE IN DETAIL: The patient was placed under general anesthesia. The   abdomen was prepped and draped in the usual manner. Access to peritoneum was   gained through the umbilicus using the Optiview trocar under direct vision.   Pneumoperitoneum to 15 mmHg with CO2 gas was obtained. Three 5 mm trocars were   placed under the right costal margin under direct vision at midline,   midclavicular line, and the anterior axial line. The gallbladder was pulled up   over the liver, exposing the triangle of Calot. Peritoneum was stripped off the   base of the gallbladder, exposing the cystic duct and artery as they entered   the gallbladder.  The cystic duct and artery were clipped divided.   The gallbladder was removed from the gallbladder bed using the hook cautery,   placed in an EndoCatch bag and removed from the abdomen through the navel.  The base was cauterized. The abdomen was irrigated, all irrigation fluid removed and   inspected for hemostasis. The trocars were removed under direct vision. Prior   to removing the last trocar, pneumoperitoneum was allowed to escape. The fascia   at the naval was closed with 0 Vicryl. The skin incisions were closed with 4-0   plain catgut, and reinforced with Dermabond. The   patient tolerated the procedure well and was brought to Recovery Room in stable   condition. Sponge and needle counts were correct at the end of the case.    Blood loss was min, complications were none, consent was obtained and pathology was gallbladder

## 2023-01-25 NOTE — BRIEF OP NOTE
Operative Note       Surgery Date: 1/25/2023     Surgeon(s) and Role:     * Anival Ford MD - Primary     * Gustavo Raines MD - Resident - Assisting    Pre-op Diagnosis:  Calculus of gallbladder without cholecystitis without obstruction [K80.20]    Post-op Diagnosis:  Calculus of gallbladder without cholecystitis without obstruction [K80.20]    Procedure(s) (LRB):  CHOLECYSTECTOMY, LAPAROSCOPIC (N/A)    Anesthesia: General    Procedure in Detail/Findings:  Moderate omental adhesions, gallbladder removed unopened.    Estimated Blood Loss: Minimal           Specimens (From admission, onward)       Start     Ordered    01/25/23 1009  Specimen to Pathology, Surgery General Surgery  Once        Comments: Pre-op Diagnosis: Calculus of gallbladder without cholecystitis without obstruction [K80.20]Procedure(s):CHOLECYSTECTOMY, LAPAROSCOPIC Number of specimens: 1Name of specimens: 1. Gallbladder - perm     References:    Click here for ordering Quick Tip   Question Answer Comment   Procedure Type: General Surgery    Specimen Class: Routine/Screening    Which provider would you like to cc? ANIVAL FORD    Release to patient Immediate        01/25/23 1022                  Implants: * No implants in log *           Disposition: PACU - hemodynamically stable.           Condition: Good    Attestation:  I was present and scrubbed for the entire procedure.

## 2023-01-25 NOTE — PLAN OF CARE
Call placed to bk Krishnan. Anesthesia consent did not transfer into chart for witness signature.

## 2023-01-25 NOTE — BRIEF OP NOTE
[facility] - Surgery ([facility])  Brief Operative Note    Surgery Date: 1/25/2023     Surgeon(s) and Role:     * Anival Ford MD - Primary     * Gustavo Raines MD - Resident - Assisting      Pre-op Diagnosis:  Calculus of gallbladder without cholecystitis without obstruction [K80.20]    Post-op Diagnosis:  Post-Op Diagnosis Codes:     * Calculus of gallbladder without cholecystitis without obstruction [K80.20]    Procedure(s) (LRB):  CHOLECYSTECTOMY, LAPAROSCOPIC (N/A)    Anesthesia: General    Operative Findings:   Critical view obtained    Estimated Blood Loss: Minimal         Specimens:   Specimen (24h ago, onward)       Start     Ordered    01/25/23 1009  Specimen to Pathology, Surgery General Surgery  Once        Comments: Pre-op Diagnosis: Calculus of gallbladder without cholecystitis without obstruction [K80.20]Procedure(s):CHOLECYSTECTOMY, LAPAROSCOPIC Number of specimens: 1Name of specimens: 1. Gallbladder - perm     References:    Click here for ordering Quick Tip   Question Answer Comment   Procedure Type: General Surgery    Specimen Class: Routine/Screening    Which provider would you like to cc? ANIVAL FORD    Release to patient Immediate        01/25/23 1022                      Discharge Note    OUTCOME: Patient tolerated treatment/procedure well without complication and is now ready for discharge.    DISPOSITION: Home or Self Care    FINAL DIAGNOSIS:  Calculus of gallbladder without cholecystitis without obstruction    FOLLOWUP: In clinic    DISCHARGE INSTRUCTIONS:    Discharge Procedure Orders   Diet Adult Regular     Lifting restrictions   Order Comments: No lifting greater than 10 pounds for 6 weeks from day of surgery.  No pushing/pulling such as vacuuming or raking.  No straining, avoid constipation and take stool softeners as described and laxatives as needed.  No driving while on narcotics and until you can react quickly without pain.     Notify your health care provider if  you experience any of the following:  difficulty breathing or increased cough     Notify your health care provider if you experience any of the following:  redness, tenderness, or signs of infection (pain, swelling, redness, odor or green/yellow discharge around incision site)     Notify your health care provider if you experience any of the following:  severe uncontrolled pain     Notify your health care provider if you experience any of the following:  persistent nausea and vomiting or diarrhea     Notify your health care provider if you experience any of the following:  temperature >100.4     No dressing needed     Gustavo Raines MD  General Surgery PGY-3  01/25/2023

## 2023-01-25 NOTE — ANESTHESIA POSTPROCEDURE EVALUATION
Anesthesia Post Evaluation    Patient: Kelly Thomason    Procedure(s) Performed: Procedure(s) (LRB):  CHOLECYSTECTOMY, LAPAROSCOPIC (N/A)    Final Anesthesia Type: general      Patient location during evaluation: PACU  Patient participation: Yes- Able to Participate  Level of consciousness: awake and alert  Post-procedure vital signs: reviewed and stable  Pain management: adequate  Airway patency: patent    PONV status at discharge: No PONV  Anesthetic complications: no      Cardiovascular status: blood pressure returned to baseline  Respiratory status: unassisted  Follow-up not needed.          Vitals Value Taken Time   /60 01/25/23 1232   Temp 36.5 °C (97.7 °F) 01/25/23 1040   Pulse 89 01/25/23 1244   Resp 13 01/25/23 1244   SpO2 94 % 01/25/23 1244   Vitals shown include unvalidated device data.      No case tracking events are documented in the log.      Pain/Luana Score: Pain Rating Prior to Med Admin: 6 (1/25/2023 12:11 PM)  Luana Score: 9 (1/25/2023 11:18 AM)        
No

## 2023-01-25 NOTE — TRANSFER OF CARE
"Anesthesia Transfer of Care Note    Patient: Kelly Thomason    Procedure(s) Performed: Procedure(s) (LRB):  CHOLECYSTECTOMY, LAPAROSCOPIC (N/A)    Patient location: [facility]    Anesthesia Type: general    Transport from OR: Transported from OR on 6-10 L/min O2 by face mask with adequate spontaneous ventilation    Post pain: adequate analgesia    Post assessment: no apparent anesthetic complications    Post vital signs: stable    Level of consciousness: awake and alert    Nausea/Vomiting: no nausea/vomiting    Complications: none    Transfer of care protocol was followed      Last vitals:   Visit Vitals  BP (!) 124/58 (BP Location: Right arm, Patient Position: Lying)   Pulse 91   Temp 36.5 °C (97.7 °F) (Temporal)   Resp 20   Ht 4' 11" (1.499 m)   Wt 80.7 kg (178 lb)   SpO2 (!) 93%   Breastfeeding No   BMI 35.95 kg/m²     "

## 2023-01-25 NOTE — PLAN OF CARE
Discharge instructions given, patient and family member, verbalized understanding. Consents verified. Vitals back to baseline

## 2023-01-25 NOTE — PLAN OF CARE
Preop assessment completed. VS WNL. NO labs ordered at start of pre-op. Belongings in a locker, Consents are not signed and witnessed at this time.

## 2023-01-26 NOTE — PROGRESS NOTES
Phone call: She is doing ok but her navel hurts.  She is using a heating pad.  She finally broke down and took a narcotic pill this morning.  She is eating ok, urinating ok, has passed gas but no bm today (took mirilax and might take prune juice), and denies fevers.  She says the wounds look ok.  There is no redness.  May take nsaids for the navel.

## 2023-01-30 ENCOUNTER — TELEPHONE (OUTPATIENT)
Dept: RADIOLOGY | Facility: HOSPITAL | Age: 43
End: 2023-01-30
Payer: COMMERCIAL

## 2023-01-30 DIAGNOSIS — R92.8 ABNORMAL MAMMOGRAM OF BOTH BREASTS: Primary | ICD-10-CM

## 2023-01-30 NOTE — TELEPHONE ENCOUNTER
Spoke with patient and explained mammogram findings.Patient expressed understanding of results. Patient scheduled abnormal mammogram follow up appointment at [facility] [place] on 2/7/2023.

## 2023-02-01 LAB
FINAL PATHOLOGIC DIAGNOSIS: NORMAL
GROSS: NORMAL
Lab: NORMAL
MICROSCOPIC EXAM: NORMAL

## 2023-02-07 ENCOUNTER — OFFICE VISIT (OUTPATIENT)
Dept: SURGERY | Facility: CLINIC | Age: 43
End: 2023-02-07
Payer: COMMERCIAL

## 2023-02-07 ENCOUNTER — HOSPITAL ENCOUNTER (OUTPATIENT)
Dept: RADIOLOGY | Facility: HOSPITAL | Age: 43
Discharge: HOME | End: 2023-02-07
Attending: OBSTETRICS & GYNECOLOGY
Payer: COMMERCIAL

## 2023-02-07 VITALS
WEIGHT: 185.5 LBS | BODY MASS INDEX: 37.4 KG/M2 | HEIGHT: 59 IN | HEART RATE: 91 BPM | TEMPERATURE: 99 F | DIASTOLIC BLOOD PRESSURE: 74 MMHG | SYSTOLIC BLOOD PRESSURE: 135 MMHG

## 2023-02-07 DIAGNOSIS — R92.8 ABNORMAL FINDING ON BREAST IMAGING: ICD-10-CM

## 2023-02-07 DIAGNOSIS — Z09 POSTOP CHECK: Primary | ICD-10-CM

## 2023-02-07 PROBLEM — K80.20 CALCULUS OF GALLBLADDER WITHOUT CHOLECYSTITIS WITHOUT OBSTRUCTION: Status: RESOLVED | Noted: 2023-01-25 | Resolved: 2023-02-07

## 2023-02-07 PROCEDURE — 99024 PR POST-OP FOLLOW-UP VISIT: ICD-10-PCS | Mod: S$GLB,,, | Performed by: SURGERY

## 2023-02-07 PROCEDURE — 99024 POSTOP FOLLOW-UP VISIT: CPT | Mod: S$GLB,,, | Performed by: SURGERY

## 2023-02-07 PROCEDURE — 1159F MED LIST DOCD IN RCRD: CPT | Mod: CPTII,S$GLB,, | Performed by: SURGERY

## 2023-02-07 PROCEDURE — 76642 US BREAST BILATERAL LIMITED: ICD-10-PCS | Mod: 26,50,, | Performed by: RADIOLOGY

## 2023-02-07 PROCEDURE — 76642 ULTRASOUND BREAST LIMITED: CPT | Mod: 26,50,, | Performed by: RADIOLOGY

## 2023-02-07 PROCEDURE — 3078F PR MOST RECENT DIASTOLIC BLOOD PRESSURE < 80 MM HG: ICD-10-PCS | Mod: CPTII,S$GLB,, | Performed by: SURGERY

## 2023-02-07 PROCEDURE — 3008F BODY MASS INDEX DOCD: CPT | Mod: CPTII,S$GLB,, | Performed by: SURGERY

## 2023-02-07 PROCEDURE — 1160F RVW MEDS BY RX/DR IN RCRD: CPT | Mod: CPTII,S$GLB,, | Performed by: SURGERY

## 2023-02-07 PROCEDURE — 99999 PR PBB SHADOW E&M-EST. PATIENT-LVL III: ICD-10-PCS | Mod: PBBFAC,,, | Performed by: SURGERY

## 2023-02-07 PROCEDURE — 77062 BREAST TOMOSYNTHESIS BI: CPT | Mod: 26,,, | Performed by: RADIOLOGY

## 2023-02-07 PROCEDURE — 3078F DIAST BP <80 MM HG: CPT | Mod: CPTII,S$GLB,, | Performed by: SURGERY

## 2023-02-07 PROCEDURE — 77066 MAMMO DIGITAL DIAGNOSTIC BILAT WITH TOMO: ICD-10-PCS | Mod: 26,,, | Performed by: RADIOLOGY

## 2023-02-07 PROCEDURE — 1160F PR REVIEW ALL MEDS BY PRESCRIBER/CLIN PHARMACIST DOCUMENTED: ICD-10-PCS | Mod: CPTII,S$GLB,, | Performed by: SURGERY

## 2023-02-07 PROCEDURE — 77062 BREAST TOMOSYNTHESIS BI: CPT | Mod: TC

## 2023-02-07 PROCEDURE — 76642 ULTRASOUND BREAST LIMITED: CPT | Mod: TC,50

## 2023-02-07 PROCEDURE — 99999 PR PBB SHADOW E&M-EST. PATIENT-LVL III: CPT | Mod: PBBFAC,,, | Performed by: SURGERY

## 2023-02-07 PROCEDURE — 3075F SYST BP GE 130 - 139MM HG: CPT | Mod: CPTII,S$GLB,, | Performed by: SURGERY

## 2023-02-07 PROCEDURE — 77062 MAMMO DIGITAL DIAGNOSTIC BILAT WITH TOMO: ICD-10-PCS | Mod: 26,,, | Performed by: RADIOLOGY

## 2023-02-07 PROCEDURE — 3008F PR BODY MASS INDEX (BMI) DOCUMENTED: ICD-10-PCS | Mod: CPTII,S$GLB,, | Performed by: SURGERY

## 2023-02-07 PROCEDURE — 3075F PR MOST RECENT SYSTOLIC BLOOD PRESS GE 130-139MM HG: ICD-10-PCS | Mod: CPTII,S$GLB,, | Performed by: SURGERY

## 2023-02-07 PROCEDURE — 77066 DX MAMMO INCL CAD BI: CPT | Mod: 26,,, | Performed by: RADIOLOGY

## 2023-02-07 PROCEDURE — 1159F PR MEDICATION LIST DOCUMENTED IN MEDICAL RECORD: ICD-10-PCS | Mod: CPTII,S$GLB,, | Performed by: SURGERY

## 2023-02-07 NOTE — PROGRESS NOTES
I have seen the patient, reviewed the Student's history and physical, assessment and plan. I have personally interviewed and examined the patient at bedside and: agree with the findings.     S/p regis grace 1/25/23.  She has minor discomfort when sitting erect and is happy with her surgery.  Wounds clear, path ok.  Doing well.  Regular duty and rtc prn.

## 2023-02-07 NOTE — PROGRESS NOTES
"[facility]  Post Op Visit    SUBJECTIVE:  Kelly Thomason is a 42 y.o. female who presents to clinic today for post op follow up after laparoscopic cholecystectomy on 1/25/23. Reports some upper abd discomfort when sitting up straight and driving. She  denies pain, fevers, chills, nausea, vomiting, diarrhea, and constipation and is returning to their usual activity level. She is tolerating diet with normal appetite and bowel function and denies redness around or drainage from incisions.    OBJECTIVE:  Vitals:    02/07/23 0834   BP: 135/74   Pulse: 91   Temp: 98.6 °F (37 °C)     Body mass index is 37.47 kg/m².    General: female in NAD. Not toxic appearing.   HENT: EOM intact.   Pulmonary: No respiratory distress. Effort normal.  Cardiovascular: Regular rate.   Abdomen: soft, non-tender, non-distended  Skin: Incisions are healing well without signs of infection. No erythema, drainage, or increased warmth noted. Dermabond flaking off some incisions.      Path:   Patient ID/MRN:  [id number]   Pathology ID/MRN:  [id number]   In formalin, labeled "gallbladder", is a 7.5 x 2.7 x 2.7 cm unopened   gallbladder.  The serosa is smooth and gray-to-green.  The wall is soft and   measures up to 0.2 cm in maximum thickness.  The mucosa is velvety and green.    No stones are identified.  The specimen is sectioned and representative   sections are submitted in cassette RIW--1-A.   William TELLO ([facility]) cm     ASSESSMENT/PLAN:    Kelly Thomason is a 42 y.o. y/o female who presents to clinic today for f/u s/p  laparoscopic cholecystectomy on 1/25/23. Clinically improving and meeting all post op milestones     - Patient is advised to avoid heavy lifting or strenuous activity for another 4 weeks.  - Path reviewed  - Follow-up PRN. Call clinic with any questions or concerns  - All questions answered; patient is comfortable with the above follow-up plan and verbalized understanding.    OMER Box-S  [facility]

## 2023-03-17 ENCOUNTER — PATIENT MESSAGE (OUTPATIENT)
Dept: RESEARCH | Facility: HOSPITAL | Age: 43
End: 2023-03-17
Payer: COMMERCIAL

## 2023-03-23 ENCOUNTER — TELEPHONE (OUTPATIENT)
Dept: OBSTETRICS AND GYNECOLOGY | Facility: CLINIC | Age: 43
End: 2023-03-23
Payer: COMMERCIAL

## 2023-03-23 ENCOUNTER — OFFICE VISIT (OUTPATIENT)
Dept: OBSTETRICS AND GYNECOLOGY | Facility: CLINIC | Age: 43
End: 2023-03-23
Payer: COMMERCIAL

## 2023-03-23 VITALS
BODY MASS INDEX: 36.33 KG/M2 | DIASTOLIC BLOOD PRESSURE: 87 MMHG | SYSTOLIC BLOOD PRESSURE: 119 MMHG | WEIGHT: 179.88 LBS

## 2023-03-23 DIAGNOSIS — Z23 NEED FOR HPV VACCINATION: ICD-10-CM

## 2023-03-23 DIAGNOSIS — Z97.5 NEXPLANON IN PLACE: ICD-10-CM

## 2023-03-23 DIAGNOSIS — Z12.4 PAP SMEAR FOR CERVICAL CANCER SCREENING: ICD-10-CM

## 2023-03-23 DIAGNOSIS — Z01.419 WELL WOMAN EXAM WITH ROUTINE GYNECOLOGICAL EXAM: Primary | ICD-10-CM

## 2023-03-23 DIAGNOSIS — Z30.9 ENCOUNTER FOR CONTRACEPTIVE MANAGEMENT, UNSPECIFIED TYPE: Primary | ICD-10-CM

## 2023-03-23 PROBLEM — Z98.890 HISTORY OF COLPOSCOPY WITH CERVICAL BIOPSY: Status: ACTIVE | Noted: 2023-03-23

## 2023-03-23 PROCEDURE — 99396 PREV VISIT EST AGE 40-64: CPT | Mod: S$GLB,,, | Performed by: NURSE PRACTITIONER

## 2023-03-23 PROCEDURE — 99999 PR PBB SHADOW E&M-EST. PATIENT-LVL III: ICD-10-PCS | Mod: PBBFAC,,, | Performed by: NURSE PRACTITIONER

## 2023-03-23 PROCEDURE — 99396 PR PREVENTIVE VISIT,EST,40-64: ICD-10-PCS | Mod: S$GLB,,, | Performed by: NURSE PRACTITIONER

## 2023-03-23 PROCEDURE — 88175 CYTOPATH C/V AUTO FLUID REDO: CPT | Performed by: NURSE PRACTITIONER

## 2023-03-23 PROCEDURE — 3008F BODY MASS INDEX DOCD: CPT | Mod: CPTII,S$GLB,, | Performed by: NURSE PRACTITIONER

## 2023-03-23 PROCEDURE — 3074F PR MOST RECENT SYSTOLIC BLOOD PRESSURE < 130 MM HG: ICD-10-PCS | Mod: CPTII,S$GLB,, | Performed by: NURSE PRACTITIONER

## 2023-03-23 PROCEDURE — 1159F MED LIST DOCD IN RCRD: CPT | Mod: CPTII,S$GLB,, | Performed by: NURSE PRACTITIONER

## 2023-03-23 PROCEDURE — 87624 HPV HI-RISK TYP POOLED RSLT: CPT | Performed by: NURSE PRACTITIONER

## 2023-03-23 PROCEDURE — 3008F PR BODY MASS INDEX (BMI) DOCUMENTED: ICD-10-PCS | Mod: CPTII,S$GLB,, | Performed by: NURSE PRACTITIONER

## 2023-03-23 PROCEDURE — 3079F DIAST BP 80-89 MM HG: CPT | Mod: CPTII,S$GLB,, | Performed by: NURSE PRACTITIONER

## 2023-03-23 PROCEDURE — 3074F SYST BP LT 130 MM HG: CPT | Mod: CPTII,S$GLB,, | Performed by: NURSE PRACTITIONER

## 2023-03-23 PROCEDURE — 3079F PR MOST RECENT DIASTOLIC BLOOD PRESSURE 80-89 MM HG: ICD-10-PCS | Mod: CPTII,S$GLB,, | Performed by: NURSE PRACTITIONER

## 2023-03-23 PROCEDURE — 99999 PR PBB SHADOW E&M-EST. PATIENT-LVL III: CPT | Mod: PBBFAC,,, | Performed by: NURSE PRACTITIONER

## 2023-03-23 PROCEDURE — 1159F PR MEDICATION LIST DOCUMENTED IN MEDICAL RECORD: ICD-10-PCS | Mod: CPTII,S$GLB,, | Performed by: NURSE PRACTITIONER

## 2023-03-23 NOTE — PROGRESS NOTES
CC: Annual  HPI: Pt is a 42 y.o.  female who presents for routine annual exam. She uses nexplanon for contraception. She does not want STD screening. Implant expires in May 2023- wants another one. Has been getting some random spotting lately. She can palpate the implant in her left upper arm. Recent mammogram requires a follow up- saw asymmetry but has dense breast tissue, declines biopsy at the time. Recurrent abnormal pap smears with HPV. Never received the vaccine but is interested. One daughter. Went to [place].     Notes from last visit with me in 2020-  CC: Annual  HPI: Pt is a 40 y.o.  female who presents for routine annual exam. She uses Nexplanon for contraception. She does not want STD screening. Happy with new nexplanon-no bleeding on it. Needs flu shot. Last year was HPV positive, colpo showed LORENA I.     FH:   Breast cancer: paternal GM  Colon cancer: paternal GM  Ovarian cancer: none    HPV vaccine: no  Last pap smear: 2022 nilm, hpv pos ---> colpo LORENA I  History of abnormal pap smears: yes; recurrent HPV    Colonoscopy: na  DEXA: na  Mammogram: current 2/2023, asymmetry in left breast  STD history: hpv  Birth control: nexplanon  OB history: G1  Tobacco use: no    ROS:  GENERAL: Feeling well overall. Denies fever or chills.   SKIN: Denies rash or lesions.   HEAD: Denies head injury or headache.   NODES: Denies enlarged lymph nodes.   CHEST: Denies chest pain or shortness of breath.   CARDIOVASCULAR: Denies palpitations or left sided chest pain.   ABDOMEN: No abdominal pain, constipation, diarrhea, nausea, vomiting or rectal bleeding.   URINARY: No dysuria, hematuria, or burning on urination.  REPRODUCTIVE: See HPI.   BREASTS: Denies pain, lumps, or nipple discharge.   HEMATOLOGIC: No easy bruisability or excessive bleeding.   MUSCULOSKELETAL: Denies joint pain or swelling.   NEUROLOGIC: Denies syncope or weakness.   PSYCHIATRIC: Denies depression, anxiety or mood swings.    PE:   APPEARANCE: Well  nourished, well developed, White female in no acute distress.  NODES: no cervical, supraclavicular, or inguinal lymphadenopathy  BREASTS: Symmetrical, no skin changes or visible lesions. No palpable masses, nipple discharge or adenopathy bilaterally.  ABDOMEN: Soft. No tenderness or masses. No distention. No hernias palpated. No CVA tenderness.  VULVA: No lesions. Normal external female genitalia.  URETHRAL MEATUS: Normal size and location, no lesions, no prolapse.  URETHRA: No masses, tenderness, or prolapse.  VAGINA: Moist. No lesions or lacerations noted. No abnormal discharge present. No odor present.   CERVIX: No lesions or discharge. No cervical motion tenderness.   UTERUS: Normal size, regular shape, mobile, non-tender.  ADNEXA: No tenderness. No fullness or masses palpated in the adnexal regions.   ANUS PERINEUM: Normal.      Diagnosis:  1. Well woman exam with routine gynecological exam    2. Pap smear for cervical cancer screening    3. Nexplanon in place    4. Need for HPV vaccination        Plan:     Orders Placed This Encounter    HPV High Risk Genotypes, PCR    (In Office Administered) HPV Vaccine (9-Valent) (3 Dose) (IM)    Liquid-Based Pap Smear, Screening     Pap updated  Schedule HPV vaccine  Mammogram due in August  Declines std screening  RTC for nexplanon removal and reinsertion    Patient was counseled today on the new ACS guidelines for cervical cytology screening as well as the current recommendations for breast cancer screening. She was counseled to follow up with her PCP for other routine health maintenance. Counseling session lasted approximately 10 minutes, and all her questions were answered.  For women over the age of 65, you can stop having cervical cancer screenings if you have never had abnormal cervical cells or cervical cancer, and youve had three negative Pap tests in a row. (You also can stop screening if youve had two negative Pap and HPV tests in a row in the past 10 years,  with at least one test in the past 5 years.),    Follow-up with me in 1 year for routine exam; pap in 3 years.     I spent a total of 20 minutes on the day of the visit.This includes face to face time and non-face to face time preparing to see the patient (eg, review of tests), obtaining and/or reviewing separately obtained history, documenting clinical information in the electronic or other health record, independently interpreting results and communicating results to the patient/family/caregiver, or care coordinator.    As of April 1, 2021, the Cures Act has been passed nationally. This new law requires that all doctors progress notes, lab results, pathology reports and radiology reports be released IMMEDIATELY to the patient in the patient portal. That means that the results are released to you at the EXACT same time they are released to me. Therefore, with all of the patients that I have I am not able to reply to each patient exactly when the results come in. So there will be a delay from when you see the results to when I see them and have time to come up with a response to send you. Also I only see these results when I am on the computer at work. So if the results come in over the weekend or after 5 pm of a work day, I will not see them until the next business day. As you can tell, this is a challenge as a provider to give every patient the quick response they hope for and deserve. So please be patient!     Thanks for your understanding and patience.

## 2023-03-28 LAB
FINAL PATHOLOGIC DIAGNOSIS: NORMAL
HPV HR 12 DNA SPEC QL NAA+PROBE: NEGATIVE
HPV16 AG SPEC QL: POSITIVE
HPV18 DNA SPEC QL NAA+PROBE: NEGATIVE
Lab: NORMAL

## 2023-03-29 ENCOUNTER — TELEPHONE (OUTPATIENT)
Dept: OBSTETRICS AND GYNECOLOGY | Facility: CLINIC | Age: 43
End: 2023-03-29
Payer: COMMERCIAL

## 2023-03-29 NOTE — TELEPHONE ENCOUNTER
Attempted to call pt in regard to scheduling colpo no answer LVM     ----- Message from Paola Tony NP sent at 3/29/2023 12:58 PM CDT -----  Hi- pt needs a colpo. She has seen Jeansonne for this in the past. Please reach out to pt to schedule.   ----- Message -----  From: [facility]  Sent: 3/28/2023  10:10 AM CDT  To: Paola Tony NP

## 2023-04-24 DIAGNOSIS — G43.009 MIGRAINE WITHOUT AURA AND WITHOUT STATUS MIGRAINOSUS, NOT INTRACTABLE: ICD-10-CM

## 2023-04-25 RX ORDER — GALCANEZUMAB 120 MG/ML
120 INJECTION, SOLUTION SUBCUTANEOUS
Qty: 1 ML | Refills: 0 | Status: SHIPPED | OUTPATIENT
Start: 2023-04-25 | End: 2023-05-02

## 2023-05-02 ENCOUNTER — CLINICAL SUPPORT (OUTPATIENT)
Dept: OBSTETRICS AND GYNECOLOGY | Facility: CLINIC | Age: 43
End: 2023-05-02
Payer: COMMERCIAL

## 2023-05-02 ENCOUNTER — PATIENT MESSAGE (OUTPATIENT)
Dept: OBSTETRICS AND GYNECOLOGY | Facility: CLINIC | Age: 43
End: 2023-05-02

## 2023-05-02 ENCOUNTER — PROCEDURE VISIT (OUTPATIENT)
Dept: OBSTETRICS AND GYNECOLOGY | Facility: CLINIC | Age: 43
End: 2023-05-02
Payer: COMMERCIAL

## 2023-05-02 VITALS
DIASTOLIC BLOOD PRESSURE: 79 MMHG | SYSTOLIC BLOOD PRESSURE: 120 MMHG | WEIGHT: 183.88 LBS | BODY MASS INDEX: 37.14 KG/M2

## 2023-05-02 DIAGNOSIS — Z23 NEED FOR HPV VACCINATION: Primary | ICD-10-CM

## 2023-05-02 DIAGNOSIS — Z30.46 ENCOUNTER FOR REMOVAL AND REINSERTION OF NEXPLANON: Primary | ICD-10-CM

## 2023-05-02 PROBLEM — Z97.5 NEXPLANON IN PLACE: Status: ACTIVE | Noted: 2023-05-02

## 2023-05-02 PROCEDURE — 81025 URINE PREGNANCY TEST: CPT | Mod: S$GLB,,, | Performed by: NURSE PRACTITIONER

## 2023-05-02 PROCEDURE — 99499 NO LOS: ICD-10-PCS | Mod: S$GLB,,, | Performed by: NURSE PRACTITIONER

## 2023-05-02 PROCEDURE — 99999 PR PBB SHADOW E&M-EST. PATIENT-LVL I: ICD-10-PCS | Mod: PBBFAC,,,

## 2023-05-02 PROCEDURE — 11983 REMOVE/INSERT DRUG IMPLANT: CPT | Mod: S$GLB,,, | Performed by: NURSE PRACTITIONER

## 2023-05-02 PROCEDURE — 99499 UNLISTED E&M SERVICE: CPT | Mod: S$GLB,,, | Performed by: NURSE PRACTITIONER

## 2023-05-02 PROCEDURE — 81025 PR  URINE PREGNANCY TEST: ICD-10-PCS | Mod: S$GLB,,, | Performed by: NURSE PRACTITIONER

## 2023-05-02 PROCEDURE — 90471 IMMUNIZATION ADMIN: CPT | Mod: S$GLB,,, | Performed by: NURSE PRACTITIONER

## 2023-05-02 PROCEDURE — 90651 9VHPV VACCINE 2/3 DOSE IM: CPT | Mod: S$GLB,,, | Performed by: NURSE PRACTITIONER

## 2023-05-02 PROCEDURE — 90651 HPV VACCINE 9-VALENT 3 DOSE IM: ICD-10-PCS | Mod: S$GLB,,, | Performed by: NURSE PRACTITIONER

## 2023-05-02 PROCEDURE — 90471 HPV VACCINE 9-VALENT 3 DOSE IM: ICD-10-PCS | Mod: S$GLB,,, | Performed by: NURSE PRACTITIONER

## 2023-05-02 PROCEDURE — 99999 PR PBB SHADOW E&M-EST. PATIENT-LVL I: CPT | Mod: PBBFAC,,,

## 2023-05-02 PROCEDURE — 11983 REMOVAL OF NEXPLANON DEVICE: ICD-10-PCS | Mod: S$GLB,,, | Performed by: NURSE PRACTITIONER

## 2023-05-02 NOTE — PROCEDURES
Removal of Nexplanon Device    Date/Time: 5/2/2023 9:20 AM  Performed by: Paola Tony NP  Authorized by: Paola Tony NP     Consent obtained:  Written  Consent given by:  Patient  Procedure risks and benefits discussed: yes    Patient questions answered: yes    Patient agrees, verbalizes understanding, and wants to proceed: yes    Instructions and paperwork completed: yes    Removal due to infection and inflammatory reaction: no    Removal due to mechanical complications of IUD/Nexplanon: no    Removal due to expiration: yes    Pre-procedure timeout performed: yes  Prepped with:  alcohol 70%  Local anesthetic:  Lidocaine without epinephrine   The site was cleaned  and prepped in a sterile fashion: yes  Insertion of Nexplanon    Date/Time: 5/2/2023 9:20 AM  Performed by: Paola Tony NP  Authorized by: Paola Tony NP     Consent obtained:  Written  Consent given by:  Patient  Patient questions answered: yes    Patient agrees, verbalizes understanding, and wants to proceed: yes    Instructions and paperwork completed: yes    Pre-procedure timeout performed: yes    Prepped with: alcohol 70%    Local anesthetic:  Lidocaine without epinephrine  The site was cleaned and prepped in a sterile fashion: yes    Small stab incision was made in arm: yes    Left/right:  Left   68 mg etonogestreL 68 mg  Preloaded Implanon trocar was placed subdermally: yes    Visualization of implant was obtained: yes    Nexplanon was inserted and trocar removed: yes    Visualization of notch in stilette and palpitation of device: yes    Palpitation confirms placement by provider and patient: yes    Site was closed with steri-strips and pressure bandage applied: yes     Exp 4/9/2025  LOT [id number]

## 2023-05-02 NOTE — PROGRESS NOTES
Here for Gardasil # 1 of 3 injection, without complaint at this time. Reports no pain prior to or post injection. Advised to wait in lobby 15 minutes post injection and report any adverse reactions.    Return to clinic in 8 weeks  for next injection.     Site: LD

## 2023-05-05 ENCOUNTER — OFFICE VISIT (OUTPATIENT)
Dept: NEUROLOGY | Facility: CLINIC | Age: 43
End: 2023-05-05
Payer: COMMERCIAL

## 2023-05-05 DIAGNOSIS — G43.009 MIGRAINE WITHOUT AURA AND WITHOUT STATUS MIGRAINOSUS, NOT INTRACTABLE: Primary | ICD-10-CM

## 2023-05-05 DIAGNOSIS — F41.9 ANXIETY: ICD-10-CM

## 2023-05-05 PROCEDURE — 99214 PR OFFICE/OUTPT VISIT, EST, LEVL IV, 30-39 MIN: ICD-10-PCS | Mod: 95,,, | Performed by: NURSE PRACTITIONER

## 2023-05-05 PROCEDURE — 1160F PR REVIEW ALL MEDS BY PRESCRIBER/CLIN PHARMACIST DOCUMENTED: ICD-10-PCS | Mod: CPTII,95,, | Performed by: NURSE PRACTITIONER

## 2023-05-05 PROCEDURE — 1160F RVW MEDS BY RX/DR IN RCRD: CPT | Mod: CPTII,95,, | Performed by: NURSE PRACTITIONER

## 2023-05-05 PROCEDURE — 1159F MED LIST DOCD IN RCRD: CPT | Mod: CPTII,95,, | Performed by: NURSE PRACTITIONER

## 2023-05-05 PROCEDURE — 1159F PR MEDICATION LIST DOCUMENTED IN MEDICAL RECORD: ICD-10-PCS | Mod: CPTII,95,, | Performed by: NURSE PRACTITIONER

## 2023-05-05 PROCEDURE — 99214 OFFICE O/P EST MOD 30 MIN: CPT | Mod: 95,,, | Performed by: NURSE PRACTITIONER

## 2023-05-05 RX ORDER — SUMATRIPTAN SUCCINATE 100 MG/1
TABLET ORAL
Qty: 27 TABLET | Refills: 3 | Status: SHIPPED | OUTPATIENT
Start: 2023-05-05

## 2023-05-05 NOTE — PROGRESS NOTES
Established Patient   SUBJECTIVE:  Patient ID: Kelly Thomason   Chief Complaint: Follow-up    History of Present Illness:  Kelly Thomason is a 42 y.o. female who presents for follow-up of headaches via virtual visit.     The patient location is: in [place]   The chief complaint leading to consultation is: Follow-up  Visit type: Virtual visit with synchronous audio and video  Total time spent with patient: 6 minutes   Each patient to whom he or she provides medical services by telemedicine is:  (1) informed of the relationship between the physician and patient and the respective role of any other health care provider with respect to management of the patient; and (2) notified that he or she may decline to receive medical services by telemedicine and may withdraw from such care at any time.    Recommendations made at last Office Visit on 5/6/22:  - D/C Aimovig   - Start Emgality 240 mg SQ loading dose followed by 120 mg SQ monthly injections.   - For acute migraines -  Sumatriptan 100 mg and ubrelvy 100 mg tabs   - Continue tracking headaches   - Continue sertraline 100 mg daily for anxiety/depression, likely with dual benefit on migraines   - RTC in 3 months or sooner if needed     05/05/2023 - Interval History:  Migraines have responded well to switch from aimovig to emgality.  Has not taken any ubrelvy in nearly 7 months.  She is for the most part migraine free until 3-4 days before she is due for her next injection, during this time she will have mild headaches.  She has been easily aborting her headaches with 1/2 tab sumatriptan 100 mg.  She is pleased with the current state of her migraines, does not wish to make adjustments to her treatment plan at this time.     Otherwise, information below is still accurate and current.     05/06/2022 - Interval History:  Migraines continue to be slightly more frequent than she would like over the last few months.  She typically experiences 1-2 mild migraines per week.   Lasting only hours not all day in duration.  She has continued using Aimovig 140 mg SQ monthly for migraine prevention, which she does feel is helping in that her migraines are no where near as bad as they were in the past.  She feels the benefits of Aimovig are gradually diminishing, would like to consider alternative treatment options.      Otherwise, information below is still accurate and current.      02/04/2022 - Interval History:  Generally speaking migraines have remained well controlled over the last 6 months.  Did experience a slight uptick in migraine frequency in December, sent message via patient portal stating sumatriptan/naproxen had not been working, was given new prescription for ubrelvy 100 mg tabs, unfortunately it took sometime for insurance to approve ubrelvy, by the time she picked up prescription from pharmacy her migraine had subsided.  She has not tried ubrelvy yet as for all subsequent migraines sumatriptan/naproxen has been effective.  Migraines returned to baseline frequency in January. Reports she is currently suffering with 3-4 headaches per month, migraines she does experience are not debilitating, she does not consider migraines to be severe.  More often than not sumatriptan 100 mg + naproxen is effective in aborting her migraines.       Otherwise, information below is still accurate and current.      08/06/2021 - Interval History:  She has been suffering with a cold, sinus congestion, coughing for the last 3 weeks, which has subsequently triggered headaches and pressure.   Outside of the last few weeks, migraines have been well controlled.  She has continued using Aimovig 140 mg SQ monthly for migraine prevention which has been helping.  Treats acute migraines with sumatriptan 100 mg tabs.  She is pleased with the current state of her migraines, does not wish to make adjustments to her treatment plan at this time.      Otherwise, information below is still accurate and current.     "  06/25/2021 - Interval History:  Aimovig was increased to 140 mg dose on 5/7/21 due to increased frequency of migraines.  She does feel increasing her dose of Aimovig has helped some, however the increased heat seems to trigger more frequent migraines.  She has been focusing on increasing her hydration.  Currently suffering with a migraine once every 1-2 weeks.  Feels migraines triggered by heat are more difficult to treat.  Typically 3-4 migraines per month.  Treats acute migraines with sumatriptan 100 mg tabs, which helps, however can have migraines persist for more than 1 day in duration.     Otherwise, information below is still accurate and current.      02/12/2021 - Interval History:  Migraines continue to be well controlled on Aimovig 70 mg SQ monthly, she has begun to notice the week she is due for her injection she begins to get mild headaches, no full blown migraines which she is very pleased about!  Despite experiencing mild headaches in the days leading up to her next dose of aimovig, she does not wish to make any adjustments to her treatment plan at this time.       Otherwise, information below is still accurate and current.      12/02/2019 - Interval History:  Migraines are "great", has only been experiencing 1-2 migraines per month, most of which are resolved by excedrin, occasionally has to take sumatriptan.  She picked up a 3 month supply of sumatriptan last January and still has 10 pills left!  She has been using Aimovig 70 mg SQ monthly, which has been very effective. She does experiencing itching to the injection site, which is relieved by ice.  She is very happy with the current state of her migraines and does not wish to make any further adjustments to her treatment plan at this time.       Otherwise, information below is still accurate and current.      01/03/2019 - Interval History:  Headaches and migraines have begun to occur more frequently, despite Botox injections, she no longer feels " "Botox is helping her migraines and wants to discuss changing her migraine prevention.  She has tried multiple oral medications as well as Botox without significant relief.  She is interested in trying a CGRP inhibitor for migraine prevention as well as discontinuing Botox injections. Migraines continue to feel the same as they always have, she denies any change in the quality or nature of her migraines.       11/13/2018- Interval History:   presents to clinic for third round of Botox, after her second round of Botox she noticed a decrease in the frequency of her migraines, admits she has been under increased stress over the last few weeks and migraines have not been significantly more frequent, which they would have been prior to beginning Botox injections.  Prior to beginning Botox, she was suffering with 1-2 debilitating migraines per week, over the last twelve weeks she feels she has only had 1-2 debilitating migraines and "a couple" moderate to severe migraines.  Sumatriptan continues to give her good relief, on only one occasion sumatriptan has not been effective following the first dose.  She has questions regarding alternative treatment options including Aimovig, Frova and Cambia.  Discussed with regards to Aimovig, I would prefer to have her continue with Botox injections, as they have been quite effective in minimizing the burden of her migraines, for at least 3 rounds.  If after round 3 she would like to stop Botox and consider alternative options I would be agreeable to this plan.  I would not recommend her use Frova for migraine abortive as the onset of action is quite slow and her migraines intensify to peak fairly quickly.  Cambia could be used to supplement Sumatriptan in place of Naprosyn, however after learning Cambia is similar to Naprosyn, she feels more comfortable with continuing with use of Naprosyn.       08/27/2018- Interval History:  She does feel migraine frequency has decreased after " first dose of Botox, also feels migraines are more responsive to medication.  She denies any presence of side effects from Botox injections and wishes to continue with treatment at this time.      06/04/2018- Interval History:  Zomig did not give her any relief, Sumatriptan gives her more relief.  Prednisone did help slightly to break up headache cycle.  She has only been taking Sumatriptan 1-2 times per week.  She continues to state she does not wish to add daily medication to prevention.  Headaches have not changed in nature or quality.  Risks, Benefits, and potential side effects of Botox discussed with patient.  Alternative treatments offered.  After thorough discussed regarding the procedure, patient has decided to move forward with initiating Botox injections for Chronic Migraine.  Patient understands we will complete 3 rounds of injections, if after 3 rounds, we do not see a 50% reduction in headaches, we will discontinue Botox injections.      04/23/2018 - Interval History:  At last appointment with Dr. Holm in January 2017, it was suggested  take 1/2 tab Sumatriptan 100 mg with 1 1/2 tabs aleve for migraine abortive.  For a while her migraines had been great, states she only rarely expereinced any migraines during pregnancy as well as while she was breast feeding.  She stopped breast feeding last summer and migraines have since gradually become more frequent.  She reports having a headache nearly everyday with full blown migraines occurring 1-2 times per week, each lasting a full day.  She has tried using sumatriptan with aleve, however she does not find this effective.  She has suffered from migraines since she was 14 years old, she denies any change in the quality or nature of her headaches, this is how they have always been, just becoming more frequent.  Migraine is described as a pounding pain typically located behind her eye (can be either right or left sided) and temple, radiating into the  neck.  Denies presence of aura symptoms.  Migraines are associated with nausea, photophobia, phonophobia.  Worsened with movement/activity.  Rates pain from 6-7 out of 10.  Additionally, she has noticed when she gets a migraine while on her menstrual cycle, the intensity is much worse, headache is harder to break, and duration is significantly longer, these headaches come the week before her menstrual cycle.  She is currently on Zoloft for prevention, but has tried numerous preventive and abortive options in the past (listed below).    Additionally, she has secondary complaints of intermittent lateral double vision which only occurs when she is either driving or watching tv.  She has noticed it typically only occurs after she has been watching tv for a while or has been driving for a good bit.  She believes the double vision resolves after she gets out of her car or walks away from the television but is unsure.  She has seen her optometrist who did not find any ocular cause for these symptoms.  She feels this has been going on for over a year.  When she gets double vision, if she closes on of her eyes these symptoms will resolve.      Notes from Dr. Holm:  History: Patient presents for follow-up for her migraine headaches.  Insurance will no longer cover Treximet, so she would like to have sumatriptan prescription and be able to add Aleve to it.  She typically takes half tablet of the Treximet.  We will give her the sumatriptan 100 mg which she can take half a tablet of in 1-1-1/2 Aleve with it.  Patient had been pregnant with no headaches for a while.  She has had increased frequency in the last 6 months with 2-3 headaches per month with good response to the Treximet.  She had tried that Cephaly  device but complains she doesn't have time to 2 minutes with a 2-year-old.  Previous note: 10-3-13: This is a 33-year-old white female who presents for followup for her migraine headaches. Patient was previously seen  by Dr. Nicolle Chatman who is no longer here. Patient describes a 20 year history of migraine headaches. She describes right or left temporal pain radiating to the hand eye causing aching/deep pain in the eye radiating into the neck. Headache intensity is 5-6/10 associated with nausea, photophobia, sonophobia and lasting all day. The patient has no aura. She generally takes a quarter tablet of Treximet with relief in 30-60 minutes. She takes Cymbalta 30 mg and nortriptyline 25 mg h.s. for migraine prevention. Overall she has been doing quite well with this medication regimen and is here predominantly to establish with a new neurologist.       Therapies Tried & Response:   Pamelor - unsure   Treximet - helped   Cefaly -   Sumatriptan -   Cymbalta - unsure  Zoloft -   Tramadol -   Excedrin -   Effexor - unsure   Topiramate - unsure, cannot try again hx of kidney stone  Zomig NS -   Zomig PO -   Lamictal -   Elavil - weight gain   Magnesium -   relpax -   Paxil - no   Midrin - ?  Darvocet -   Nadolol  Prednisone -    Baclofen -   Zomig PO -   Botox -helping   Aimovig - effective, losing efficacy    Ubrelvy -   emgality - helping     Current Medications:    galcanezumab-gnlm 120 mg/mL PnIj, Inject 120 mg into the skin every 28 days. Begin 28 days after loading dose., Disp: 1 each, Rfl: 11    sertraline (ZOLOFT) 100 MG tablet, TAKE 1 TABLET BY MOUTH ONCE DAILY (Patient taking differently: every evening.), Disp: 90 tablet, Rfl: 3    sumatriptan (IMITREX) 100 MG tablet, Take 1 tablet at onset of migraine, can repeat in 2 hrs if needed. Max 2 tabs/day. Max 3 days/week., Disp: 27 tablet, Rfl: 3    ubrogepant (UBRELVY) 100 mg tablet, Take 1 tablet (100 mg total) by mouth every 2 (two) hours as needed for Migraine. Max 200 mg/day., Disp: 10 tablet, Rfl: 2    Current Facility-Administered Medications:     etonogestreL subdermal device 68 mg, 68 mg, , , Paola Tony, NP, 68 mg at 05/21/20 1040    etonogestreL subdermal device  68 mg, 68 mg, Implant, , Paola Tony, NP, 68 mg at 05/02/23 0920    Review of Systems - A review of 10+ systems was conducted with pertinent positive and negative findings documented in HPI with all other systems reviewed and negative.    PFSH: Past medical, family, and social history reviewed as documented in chart with pertinent positive medical, family, and social history detailed in HPI.    OBJECTIVE:  Vitals: LMP  (LMP Unknown)      Physical Exam:  Constitutional: she appears well-developed and well-nourished. she is well groomed. NAD.     Review of Data:   Notes from internal medicine reviewed   Labs:  Office Visit on 03/23/2023   Component Date Value Ref Range Status    HPV other High Risk types, PCR 03/23/2023 Negative  Negative Final    HPV High Risk type 16, PCR 03/23/2023 Positive (A)  Negative Final    HPV High Risk type 18, PCR 03/23/2023 Negative  Negative Final    Final Pathologic Diagnosis 03/23/2023    Final                    Value:Specimen Adequacy  Satisfactory for interpretation. Endocervical component is present.  [place] Category  Negative for intraepithelial lesion or malignancy.      Disclaimer 03/23/2023    Final                    Value:The Pap smear is a screening test that aids in the detection of cervical  cancer and cancer precursors. Both false positive and false negative results  can occur. The test should be used at regular intervals, and positive results  should be confirmed before definitive therapy.  This liquid based specimen is processed using the  or  Thin PrepPAP  System. This specimen has been analyzed by the ThinPrep Imaging System  ([email or web address]), an automated imaging and review system which assists  the laboratory in evaluating cells on ThinPrep PAP tests. Following automated  imaging, selected fields from every slide are reviewed by a cytotechnologist  and/or pathologist.  Screening was performed at [facility], [address] [place] [address].     Admission on 01/25/2023, Discharged on 01/25/2023   Component Date Value Ref Range Status    POC Preg Test, Ur 01/25/2023 Negative  Negative Final     Acceptable 01/25/2023 Yes   Final    Final Pathologic Diagnosis 01/25/2023    Final                    Value:Gallbladder, cholecystectomy:  - Mild chronic cholecystitis  - Cholelithiasis      Microscopic Exam 01/25/2023    Final                    Value:Microscopic examination is performed and the results are incorporated into  the above findings.      Gross 01/25/2023    Final                    Value:Patient ID/MRN:  [id number]  Pathology ID/MRN:  [id number]  In formalin, labeled "gallbladder", is a 7.5 x 2.7 x 2.7 cm unopened  gallbladder.  The serosa is smooth and gray-to-green.  The wall is soft and  measures up to 0.2 cm in maximum thickness.  The mucosa is velvety and green.   No stones are identified.  The specimen is sectioned and representative  sections are submitted in cassette IOF--1-ASarbjit TELLO ([facility]) cm      Disclaimer 01/25/2023    Final                    Value:Unless the case is a 'gross only' or additional testing only, the final  diagnosis for each specimen is based on a microscopic examination of  appropriate tissue sections.     Admission on 01/17/2023, Discharged on 01/17/2023   Component Date Value Ref Range Status    HIV 1/2 Ag/Ab 01/17/2023 Non-reactive  Non-reactive Final    Hepatitis C Ab 01/17/2023 Non-reactive  Non-reactive Final    WBC 01/17/2023 10.98  3.90 - 12.70 K/uL Final    RBC 01/17/2023 4.57  4.00 - 5.40 M/uL Final    Hemoglobin 01/17/2023 14.2  12.0 - 16.0 g/dL Final    Hematocrit 01/17/2023 41.9  37.0 - 48.5 % Final    MCV 01/17/2023 92  82 - 98 fL Final    MCH 01/17/2023 31.1 (H)  27.0 - 31.0 pg Final    MCHC 01/17/2023 33.9  32.0 - 36.0 g/dL Final    RDW 01/17/2023 12.9  11.5 - 14.5 % Final    Platelets 01/17/2023 338  150 - 450 K/uL Final "    MPV 01/17/2023 9.2  9.2 - 12.9 fL Final    Immature Granulocytes 01/17/2023 0.4  0.0 - 0.5 % Final    Gran # (ANC) 01/17/2023 8.1 (H)  1.8 - 7.7 K/uL Final    Immature Grans (Abs) 01/17/2023 0.04  0.00 - 0.04 K/uL Final    Lymph # 01/17/2023 2.0  1.0 - 4.8 K/uL Final    Mono # 01/17/2023 0.7  0.3 - 1.0 K/uL Final    Eos # 01/17/2023 0.1  0.0 - 0.5 K/uL Final    Baso # 01/17/2023 0.02  0.00 - 0.20 K/uL Final    nRBC 01/17/2023 0  0 /100 WBC Final    Gran % 01/17/2023 73.6 (H)  38.0 - 73.0 % Final    Lymph % 01/17/2023 18.4  18.0 - 48.0 % Final    Mono % 01/17/2023 6.6  4.0 - 15.0 % Final    Eosinophil % 01/17/2023 0.8  0.0 - 8.0 % Final    Basophil % 01/17/2023 0.2  0.0 - 1.9 % Final    Differential Method 01/17/2023 Automated   Final    Sodium 01/17/2023 140  136 - 145 mmol/L Final    Potassium 01/17/2023 4.0  3.5 - 5.1 mmol/L Final    Chloride 01/17/2023 106  95 - 110 mmol/L Final    CO2 01/17/2023 23  23 - 29 mmol/L Final    Glucose 01/17/2023 115 (H)  70 - 110 mg/dL Final    BUN 01/17/2023 21 (H)  6 - 20 mg/dL Final    Creatinine 01/17/2023 0.8  0.5 - 1.4 mg/dL Final    Calcium 01/17/2023 10.2  8.7 - 10.5 mg/dL Final    Total Protein 01/17/2023 7.6  6.0 - 8.4 g/dL Final    Albumin 01/17/2023 4.0  3.5 - 5.2 g/dL Final    Total Bilirubin 01/17/2023 0.3  0.1 - 1.0 mg/dL Final    Alkaline Phosphatase 01/17/2023 108  55 - 135 U/L Final    AST 01/17/2023 22  10 - 40 U/L Final    ALT 01/17/2023 34  10 - 44 U/L Final    Anion Gap 01/17/2023 11  8 - 16 mmol/L Final    eGFR 01/17/2023 >60.0  >60 mL/min/1.73 m^2 Final    Lipase 01/17/2023 30  4 - 60 U/L Final    Specimen UA 01/17/2023 Urine, Clean Catch   Final    Color, UA 01/17/2023 Yellow  Yellow, Straw, Belle Final    Appearance, UA 01/17/2023 Hazy (A)  Clear Final    pH, UA 01/17/2023 6.0  5.0 - 8.0 Final    Specific Gravity, UA 01/17/2023 1.025  1.005 - 1.030 Final    Protein, UA 01/17/2023 Negative  Negative Final    Glucose, UA 01/17/2023 Negative  Negative Final     Ketones, UA 01/17/2023 Negative  Negative Final    Bilirubin (UA) 01/17/2023 Negative  Negative Final    Occult Blood UA 01/17/2023 Negative  Negative Final    Nitrite, UA 01/17/2023 Negative  Negative Final    Leukocytes, UA 01/17/2023 1+ (A)  Negative Final    POC Preg Test, Ur 01/17/2023 Negative  Negative Final     Acceptable 01/17/2023 Yes   Final    RBC, UA 01/17/2023 3  0 - 4 /hpf Final    WBC, UA 01/17/2023 7 (H)  0 - 5 /hpf Final    Bacteria 01/17/2023 Many (A)  None-Occ /hpf Final    Squam Epithel, UA 01/17/2023 9  /hpf Final    Hyaline Casts, UA 01/17/2023 3 (A)  0-1/lpf /lpf Final    Ca Oxalate Genevieve, UA 01/17/2023 Occasional  None-Moderate Final    Microscopic Comment 01/17/2023 SEE COMMENT   Final     Imaging:  No results found for this or any previous visit.  Note: I have independently reviewed any/all imaging/labs/tests and agree with the report (s) as documented.  Any discrepancies will be as noted/demarcated by free text.  JOJO, JUANA-C 5/5/2023    ASSESSMENT:  1. Migraine without aura and without status migrainosus, not intractable    2. Anxiety      PLAN:  - For migraine prevention - continue emgality 120 mg SQ monthly   - Should wear off effect worsen - could consider changing to ajovy   - For acute migraines - sumatriptan 100 mg   - Secondary option - ubrelvy 100 mg   - refills provided    - Continue tracking headaches   - Anxiety - stable on sertraline, management per PCP   - RTC in 1 year or sooner if needed    Orders Placed This Encounter    galcanezumab-gnlm 120 mg/mL PnIj    sumatriptan (IMITREX) 100 MG tablet    ubrogepant (UBRELVY) 100 mg tablet     Questions and concerns were sought and answered to the patient's stated verbal satisfaction.  The patient verbalizes understanding and agreement with the above stated treatment plan.     CC: MD Makayla Bhatt, FNP-C  [facility] [place]   [phone]    Dr. Perez was available during  today's encounter.

## 2023-05-11 ENCOUNTER — TELEPHONE (OUTPATIENT)
Dept: PHARMACY | Facility: CLINIC | Age: 43
End: 2023-05-11
Payer: COMMERCIAL

## 2023-05-19 ENCOUNTER — PROCEDURE VISIT (OUTPATIENT)
Dept: OBSTETRICS AND GYNECOLOGY | Facility: CLINIC | Age: 43
End: 2023-05-19
Payer: COMMERCIAL

## 2023-05-19 VITALS
WEIGHT: 178.56 LBS | HEIGHT: 59 IN | BODY MASS INDEX: 36 KG/M2 | DIASTOLIC BLOOD PRESSURE: 88 MMHG | SYSTOLIC BLOOD PRESSURE: 130 MMHG

## 2023-05-19 DIAGNOSIS — B97.7 HPV IN FEMALE: Primary | ICD-10-CM

## 2023-05-19 PROCEDURE — 57454 BX/CURETT OF CERVIX W/SCOPE: CPT | Mod: S$GLB,,, | Performed by: OBSTETRICS & GYNECOLOGY

## 2023-05-19 PROCEDURE — 88305 TISSUE EXAM BY PATHOLOGIST: ICD-10-PCS | Mod: 26,,, | Performed by: PATHOLOGY

## 2023-05-19 PROCEDURE — 88305 TISSUE EXAM BY PATHOLOGIST: CPT | Mod: 59 | Performed by: PATHOLOGY

## 2023-05-19 PROCEDURE — 88305 TISSUE EXAM BY PATHOLOGIST: CPT | Mod: 26,,, | Performed by: PATHOLOGY

## 2023-05-19 PROCEDURE — 57454 COLPOSCOPY: ICD-10-PCS | Mod: S$GLB,,, | Performed by: OBSTETRICS & GYNECOLOGY

## 2023-05-19 NOTE — PROCEDURES
Colposcopy    Date/Time: 5/19/2023 11:00 AM  Performed by: Julie R. Jeansonne, MD  Authorized by: Julie R. Jeansonne, MD     Consent Done?:  Yes (Written)  Timeout:Immediately prior to procedure a time out was called to verify the correct patient, procedure, equipment, support staff and site/side marked as required  Prep:Patient was prepped and draped in the usual sterile fashion  Assistants?: Yes    List of assistants:  Loly GOYAL was present for the entire procedure.    Colposcopy Site:  Cervix  Position:  Supine   Patient was prepped and draped in the normal sterile fashion.  Acrowhite Lesion: No    Atypical Vessels: No    Transformation Zone Adequate?: Yes    Biopsy?: Yes         Location:  Cervix ((9 00 and 12 00))  ECC Performed?: Yes    LEEP Performed?: No    Estimated blood loss (cc):  2   Patient tolerated the procedure well with no immediate complications.   Post-operative instructions were provided for the patient.   Patient was discharged and will follow up if any complications occur     HPV 16, colpo LORENA 1 last year. Got gardasil recently.

## 2023-05-25 LAB
FINAL PATHOLOGIC DIAGNOSIS: NORMAL
GROSS: NORMAL
Lab: NORMAL

## 2023-06-16 DIAGNOSIS — F41.9 ANXIETY: ICD-10-CM

## 2023-06-19 RX ORDER — SERTRALINE HYDROCHLORIDE 100 MG/1
TABLET, FILM COATED ORAL
Qty: 90 TABLET | Refills: 3 | Status: SHIPPED | OUTPATIENT
Start: 2023-06-19

## 2023-06-20 ENCOUNTER — TELEPHONE (OUTPATIENT)
Dept: PHARMACY | Facility: CLINIC | Age: 43
End: 2023-06-20
Payer: COMMERCIAL

## 2023-06-27 ENCOUNTER — PATIENT MESSAGE (OUTPATIENT)
Dept: INTERNAL MEDICINE | Facility: CLINIC | Age: 43
End: 2023-06-27
Payer: COMMERCIAL

## 2023-06-27 DIAGNOSIS — Z00.00 ROUTINE PHYSICAL EXAMINATION: Primary | ICD-10-CM

## 2023-06-27 NOTE — TELEPHONE ENCOUNTER
Lab signed  Orders Placed This Encounter   Procedures    CBC Auto Differential    Lipid Panel    Comprehensive Metabolic Panel    Hemoglobin A1C    TSH

## 2023-06-30 ENCOUNTER — OFFICE VISIT (OUTPATIENT)
Dept: INTERNAL MEDICINE | Facility: CLINIC | Age: 43
End: 2023-06-30
Payer: COMMERCIAL

## 2023-06-30 ENCOUNTER — LAB VISIT (OUTPATIENT)
Dept: LAB | Facility: HOSPITAL | Age: 43
End: 2023-06-30
Payer: COMMERCIAL

## 2023-06-30 VITALS
OXYGEN SATURATION: 97 % | BODY MASS INDEX: 35.78 KG/M2 | HEART RATE: 91 BPM | WEIGHT: 177.5 LBS | DIASTOLIC BLOOD PRESSURE: 80 MMHG | HEIGHT: 59 IN | SYSTOLIC BLOOD PRESSURE: 122 MMHG

## 2023-06-30 DIAGNOSIS — M25.519 NECK AND SHOULDER PAIN: ICD-10-CM

## 2023-06-30 DIAGNOSIS — M79.671 HEEL PAIN, BILATERAL: ICD-10-CM

## 2023-06-30 DIAGNOSIS — Z00.00 ANNUAL PHYSICAL EXAM: Primary | ICD-10-CM

## 2023-06-30 DIAGNOSIS — Z00.00 ROUTINE PHYSICAL EXAMINATION: ICD-10-CM

## 2023-06-30 DIAGNOSIS — M79.672 HEEL PAIN, BILATERAL: ICD-10-CM

## 2023-06-30 DIAGNOSIS — M54.2 NECK AND SHOULDER PAIN: ICD-10-CM

## 2023-06-30 LAB
ALBUMIN SERPL BCP-MCNC: 4 G/DL (ref 3.5–5.2)
ALP SERPL-CCNC: 88 U/L (ref 55–135)
ALT SERPL W/O P-5'-P-CCNC: 28 U/L (ref 10–44)
ANION GAP SERPL CALC-SCNC: 14 MMOL/L (ref 8–16)
AST SERPL-CCNC: 20 U/L (ref 10–40)
BASOPHILS # BLD AUTO: 0.02 K/UL (ref 0–0.2)
BASOPHILS NFR BLD: 0.4 % (ref 0–1.9)
BILIRUB SERPL-MCNC: 0.4 MG/DL (ref 0.1–1)
BUN SERPL-MCNC: 14 MG/DL (ref 6–20)
CALCIUM SERPL-MCNC: 9.8 MG/DL (ref 8.7–10.5)
CHLORIDE SERPL-SCNC: 110 MMOL/L (ref 95–110)
CHOLEST SERPL-MCNC: 184 MG/DL (ref 120–199)
CHOLEST/HDLC SERPL: 3.5 {RATIO} (ref 2–5)
CO2 SERPL-SCNC: 18 MMOL/L (ref 23–29)
CREAT SERPL-MCNC: 0.9 MG/DL (ref 0.5–1.4)
DIFFERENTIAL METHOD: NORMAL
EOSINOPHIL # BLD AUTO: 0.1 K/UL (ref 0–0.5)
EOSINOPHIL NFR BLD: 1.7 % (ref 0–8)
ERYTHROCYTE [DISTWIDTH] IN BLOOD BY AUTOMATED COUNT: 13.4 % (ref 11.5–14.5)
EST. GFR  (NO RACE VARIABLE): >60 ML/MIN/1.73 M^2
ESTIMATED AVG GLUCOSE: 105 MG/DL (ref 68–131)
GLUCOSE SERPL-MCNC: 93 MG/DL (ref 70–110)
HBA1C MFR BLD: 5.3 % (ref 4–5.6)
HCT VFR BLD AUTO: 43.2 % (ref 37–48.5)
HDLC SERPL-MCNC: 52 MG/DL (ref 40–75)
HDLC SERPL: 28.3 % (ref 20–50)
HGB BLD-MCNC: 14.3 G/DL (ref 12–16)
IMM GRANULOCYTES # BLD AUTO: 0.01 K/UL (ref 0–0.04)
IMM GRANULOCYTES NFR BLD AUTO: 0.2 % (ref 0–0.5)
LDLC SERPL CALC-MCNC: 107.4 MG/DL (ref 63–159)
LYMPHOCYTES # BLD AUTO: 1.6 K/UL (ref 1–4.8)
LYMPHOCYTES NFR BLD: 29.5 % (ref 18–48)
MCH RBC QN AUTO: 30.2 PG (ref 27–31)
MCHC RBC AUTO-ENTMCNC: 33.1 G/DL (ref 32–36)
MCV RBC AUTO: 91 FL (ref 82–98)
MONOCYTES # BLD AUTO: 0.5 K/UL (ref 0.3–1)
MONOCYTES NFR BLD: 9.6 % (ref 4–15)
NEUTROPHILS # BLD AUTO: 3.2 K/UL (ref 1.8–7.7)
NEUTROPHILS NFR BLD: 58.6 % (ref 38–73)
NONHDLC SERPL-MCNC: 132 MG/DL
NRBC BLD-RTO: 0 /100 WBC
PLATELET # BLD AUTO: 292 K/UL (ref 150–450)
PMV BLD AUTO: 9.6 FL (ref 9.2–12.9)
POTASSIUM SERPL-SCNC: 4 MMOL/L (ref 3.5–5.1)
PROT SERPL-MCNC: 7.1 G/DL (ref 6–8.4)
RBC # BLD AUTO: 4.74 M/UL (ref 4–5.4)
SODIUM SERPL-SCNC: 142 MMOL/L (ref 136–145)
TRIGL SERPL-MCNC: 123 MG/DL (ref 30–150)
TSH SERPL DL<=0.005 MIU/L-ACNC: 2.46 UIU/ML (ref 0.4–4)
WBC # BLD AUTO: 5.42 K/UL (ref 3.9–12.7)

## 2023-06-30 PROCEDURE — 84443 ASSAY THYROID STIM HORMONE: CPT | Performed by: PHYSICIAN ASSISTANT

## 2023-06-30 PROCEDURE — 1160F RVW MEDS BY RX/DR IN RCRD: CPT | Mod: CPTII,S$GLB,, | Performed by: PHYSICIAN ASSISTANT

## 2023-06-30 PROCEDURE — 3008F PR BODY MASS INDEX (BMI) DOCUMENTED: ICD-10-PCS | Mod: CPTII,S$GLB,, | Performed by: PHYSICIAN ASSISTANT

## 2023-06-30 PROCEDURE — 3008F BODY MASS INDEX DOCD: CPT | Mod: CPTII,S$GLB,, | Performed by: PHYSICIAN ASSISTANT

## 2023-06-30 PROCEDURE — 80061 LIPID PANEL: CPT | Performed by: PHYSICIAN ASSISTANT

## 2023-06-30 PROCEDURE — 99999 PR PBB SHADOW E&M-EST. PATIENT-LVL IV: ICD-10-PCS | Mod: PBBFAC,,, | Performed by: PHYSICIAN ASSISTANT

## 2023-06-30 PROCEDURE — 36415 COLL VENOUS BLD VENIPUNCTURE: CPT | Performed by: PHYSICIAN ASSISTANT

## 2023-06-30 PROCEDURE — 80053 COMPREHEN METABOLIC PANEL: CPT | Performed by: PHYSICIAN ASSISTANT

## 2023-06-30 PROCEDURE — 99396 PREV VISIT EST AGE 40-64: CPT | Mod: S$GLB,,, | Performed by: PHYSICIAN ASSISTANT

## 2023-06-30 PROCEDURE — 3074F PR MOST RECENT SYSTOLIC BLOOD PRESSURE < 130 MM HG: ICD-10-PCS | Mod: CPTII,S$GLB,, | Performed by: PHYSICIAN ASSISTANT

## 2023-06-30 PROCEDURE — 1159F PR MEDICATION LIST DOCUMENTED IN MEDICAL RECORD: ICD-10-PCS | Mod: CPTII,S$GLB,, | Performed by: PHYSICIAN ASSISTANT

## 2023-06-30 PROCEDURE — 83036 HEMOGLOBIN GLYCOSYLATED A1C: CPT | Performed by: PHYSICIAN ASSISTANT

## 2023-06-30 PROCEDURE — 1159F MED LIST DOCD IN RCRD: CPT | Mod: CPTII,S$GLB,, | Performed by: PHYSICIAN ASSISTANT

## 2023-06-30 PROCEDURE — 3074F SYST BP LT 130 MM HG: CPT | Mod: CPTII,S$GLB,, | Performed by: PHYSICIAN ASSISTANT

## 2023-06-30 PROCEDURE — 3079F DIAST BP 80-89 MM HG: CPT | Mod: CPTII,S$GLB,, | Performed by: PHYSICIAN ASSISTANT

## 2023-06-30 PROCEDURE — 85025 COMPLETE CBC W/AUTO DIFF WBC: CPT | Performed by: PHYSICIAN ASSISTANT

## 2023-06-30 PROCEDURE — 99396 PR PREVENTIVE VISIT,EST,40-64: ICD-10-PCS | Mod: S$GLB,,, | Performed by: PHYSICIAN ASSISTANT

## 2023-06-30 PROCEDURE — 1160F PR REVIEW ALL MEDS BY PRESCRIBER/CLIN PHARMACIST DOCUMENTED: ICD-10-PCS | Mod: CPTII,S$GLB,, | Performed by: PHYSICIAN ASSISTANT

## 2023-06-30 PROCEDURE — 3079F PR MOST RECENT DIASTOLIC BLOOD PRESSURE 80-89 MM HG: ICD-10-PCS | Mod: CPTII,S$GLB,, | Performed by: PHYSICIAN ASSISTANT

## 2023-06-30 PROCEDURE — 99999 PR PBB SHADOW E&M-EST. PATIENT-LVL IV: CPT | Mod: PBBFAC,,, | Performed by: PHYSICIAN ASSISTANT

## 2023-06-30 NOTE — PROGRESS NOTES
"    Subjective     Patient ID: Kelly Thomason is a 42 y.o. female.    Chief Complaint: Annual Exam    HPI    Established pt of Christiano Hurst MD     Here for annual exam.     Previsit lab pending at time of visit.     Migraines stable with current meds, followed by neurology    Notes b/l heel pain, worse first step notes hx of planta fasciitis. Occ taking NSAIDs which help.     Notes right neck/should pain, tightness/ache, attributes to posture and working from home. No n/t or weakness.     Past Medical History:   Diagnosis Date    Abnormal Pap smear of cervix     Depression     Head ache     Hives     Kidney stones     Urticaria      Social History     Tobacco Use    Smoking status: Never    Smokeless tobacco: Never   Substance Use Topics    Alcohol use: Not Currently     Comment: Rare    Drug use: No     Review of patient's allergies indicates:   Allergen Reactions    Percocet [oxycodone-acetaminophen] Other (See Comments)     Caused mobility problems       Review of Systems   Constitutional:  Negative for activity change and unexpected weight change.   HENT:  Negative for hearing loss, rhinorrhea and trouble swallowing.    Eyes:  Negative for discharge and visual disturbance.   Respiratory:  Negative for chest tightness and wheezing.    Cardiovascular:  Negative for chest pain and palpitations.   Gastrointestinal:  Negative for blood in stool, constipation, diarrhea and vomiting.   Endocrine: Negative for polydipsia and polyuria.   Genitourinary:  Negative for difficulty urinating, dysuria, hematuria and menstrual problem.   Musculoskeletal:  Positive for arthralgias and neck pain. Negative for joint swelling.   Neurological:  Negative for weakness and headaches.   Psychiatric/Behavioral:  Negative for confusion and dysphoric mood.      Objective  /80 (BP Location: Right arm, Patient Position: Sitting, BP Method: Large (Manual))   Pulse 91   Ht 4' 11" (1.499 m)   Wt 80.5 kg (177 lb 7.5 oz)   SpO2 " 97%   BMI 35.84 kg/m²       Physical Exam  Vitals reviewed.   Constitutional:       General: She is not in acute distress.     Appearance: She is well-developed.   HENT:      Head: Normocephalic and atraumatic.      Right Ear: Tympanic membrane, ear canal and external ear normal.      Left Ear: Tympanic membrane, ear canal and external ear normal.   Cardiovascular:      Rate and Rhythm: Normal rate and regular rhythm.      Heart sounds: No murmur heard.  Pulmonary:      Effort: Pulmonary effort is normal.      Breath sounds: Normal breath sounds. No wheezing or rales.   Abdominal:      General: Bowel sounds are normal.      Palpations: Abdomen is soft.      Tenderness: There is no abdominal tenderness.   Musculoskeletal:      Cervical back: Pain with movement and muscular tenderness present. No spinous process tenderness.      Right lower leg: No edema.      Left lower leg: No edema.        Feet:    Feet:      Comments: Ambulating without difficulty  Skin:     General: Skin is warm and dry.      Findings: No rash.   Neurological:      Mental Status: She is alert and oriented to person, place, and time.   Psychiatric:         Mood and Affect: Mood normal.          Assessment and Plan     1. Annual physical exam   reviewed and updated  Further recs to follow pending lab results  Lifestyle mods discussed    2. Heel pain, bilateral  Discussed xray, pt declines at this time  Recommend symptomatic care with exercise, ice, heel inserts  Notify clinic if symptoms worsen or fail to improve    3. Neck and shoulder pain  Advised on NSAIDs and heat prn  Handout on stretching  Notify if symptoms worsen or fail to improve      RTC in 1 year for next annual       Savana Schwarz PA-C

## 2023-07-02 ENCOUNTER — PATIENT MESSAGE (OUTPATIENT)
Dept: INTERNAL MEDICINE | Facility: CLINIC | Age: 43
End: 2023-07-02
Payer: COMMERCIAL

## 2023-07-03 NOTE — TELEPHONE ENCOUNTER
Pt reviewed her labwork and is asking about low CO2 level    Pt CO2 18mmol/L as of 6/30/23 labwork

## 2023-08-09 ENCOUNTER — PATIENT MESSAGE (OUTPATIENT)
Dept: OBSTETRICS AND GYNECOLOGY | Facility: CLINIC | Age: 43
End: 2023-08-09
Payer: COMMERCIAL

## 2023-09-19 ENCOUNTER — HOSPITAL ENCOUNTER (OUTPATIENT)
Dept: RADIOLOGY | Facility: HOSPITAL | Age: 43
Discharge: HOME | End: 2023-09-19
Attending: OBSTETRICS & GYNECOLOGY
Payer: COMMERCIAL

## 2023-09-19 DIAGNOSIS — R92.8 ABNORMAL MAMMOGRAM: ICD-10-CM

## 2023-09-19 DIAGNOSIS — R92.8 ABNORMAL FINDING ON BREAST IMAGING: ICD-10-CM

## 2023-09-19 PROCEDURE — 77065 DX MAMMO INCL CAD UNI: CPT | Mod: 26,LT,, | Performed by: RADIOLOGY

## 2023-09-19 PROCEDURE — 77061 MAMMO DIGITAL DIAGNOSTIC LEFT WITH TOMO: ICD-10-PCS | Mod: 26,LT,, | Performed by: RADIOLOGY

## 2023-09-19 PROCEDURE — 77065 MAMMO DIGITAL DIAGNOSTIC LEFT WITH TOMO: ICD-10-PCS | Mod: 26,LT,, | Performed by: RADIOLOGY

## 2023-09-19 PROCEDURE — 77065 DX MAMMO INCL CAD UNI: CPT | Mod: TC,LT

## 2023-09-19 PROCEDURE — 77061 BREAST TOMOSYNTHESIS UNI: CPT | Mod: 26,LT,, | Performed by: RADIOLOGY

## 2024-01-23 ENCOUNTER — OFFICE VISIT (OUTPATIENT)
Dept: URGENT CARE | Facility: CLINIC | Age: 44
End: 2024-01-23
Payer: COMMERCIAL

## 2024-01-23 VITALS
SYSTOLIC BLOOD PRESSURE: 139 MMHG | WEIGHT: 177 LBS | TEMPERATURE: 98 F | HEART RATE: 96 BPM | DIASTOLIC BLOOD PRESSURE: 96 MMHG | OXYGEN SATURATION: 96 % | BODY MASS INDEX: 35.75 KG/M2 | RESPIRATION RATE: 16 BRPM

## 2024-01-23 DIAGNOSIS — S61.451A CAT BITE OF RIGHT HAND, INITIAL ENCOUNTER: Primary | ICD-10-CM
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
## 2024-01-23 PROCEDURE — 99203 OFFICE O/P NEW LOW 30 MIN: CPT | Mod: 25,S$GLB,, | Performed by: NURSE PRACTITIONER

## 2024-01-23 PROCEDURE — 90471 IMMUNIZATION ADMIN: CPT | Mod: S$GLB,,, | Performed by: NURSE PRACTITIONER

## 2024-01-23 PROCEDURE — 90715 TDAP VACCINE 7 YRS/> IM: CPT | Mod: S$GLB,,, | Performed by: NURSE PRACTITIONER

## 2024-01-23 RX ORDER — MUPIROCIN 20 MG/G
OINTMENT TOPICAL 3 TIMES DAILY
Qty: 30 G | Refills: 0 | Status: SHIPPED | OUTPATIENT
Start: 2024-01-23 | End: 2024-01-30

## 2024-01-23 RX ORDER — AMOXICILLIN AND CLAVULANATE POTASSIUM 875; 125 MG/1; MG/1
1 TABLET, FILM COATED ORAL EVERY 12 HOURS
Qty: 14 TABLET | Refills: 0 | Status: SHIPPED | OUTPATIENT
Start: 2024-01-23 | End: 2024-01-26 | Stop reason: SINTOL

## 2024-01-23 NOTE — PATIENT INSTRUCTIONS
Keep site clean (dial soap ideal)    Monitor site for increased redness, swelling, pain, decreased movement of hand, red-streaking, pus-like drainage, or fever and follow up with any of these.

## 2024-01-23 NOTE — PROGRESS NOTES
Subjective:      Patient ID: Kelly Thomason is a 43 y.o. female.    Vitals:  weight is 80.3 kg (177 lb). Her oral temperature is 98.4 °F (36.9 °C). Her blood pressure is 139/96 (abnormal) and her pulse is 96. Her respiration is 16 and oxygen saturation is 96%.     Chief Complaint: Animal Bite    This is a 43 y.o. female who presents today with a chief complaint of cat bite to the hand that occurred yesterday at approx 1:30pm, while placing cat in carrier. Cat is a newer pet who belongs to pt and is current on vaccines. Due for tetanus shot.    Home tx: soap and water; peroxide; alcohol    PMH: tetanus 2014;     Animal Bite   The incident occurred yesterday. The incident occurred at home. There is an injury to the Right hand. The patient is experiencing no pain. It is unlikely that a foreign body is present. Pertinent negatives include no abdominal pain and no nausea. Tetanus status: 9/2014. There were no sick contacts.       Gastrointestinal:  Negative for abdominal pain and nausea.   Skin:  Positive for puncture wound and erythema.      Objective:     Physical Exam   Constitutional: She is oriented to person, place, and time.  Non-toxic appearance. She does not appear ill. No distress.   HENT:   Head: Normocephalic.   Nose: Nose normal.   Mouth/Throat: Mucous membranes are moist.   Cardiovascular: Normal rate.   Pulmonary/Chest: Effort normal.   Abdominal: Normal appearance.   Musculoskeletal: Normal range of motion.         General: Normal range of motion.   Neurological: She is alert and oriented to person, place, and time.   Skin: Skin is warm, dry and not diaphoretic. Capillary refill takes less than 2 seconds. erythema         Comments: Puncture wound noted to right hand at proximal 2nd digit with surrounding erythema. No skin induration and no cellulitis. No drainage. Wound appears clean   Psychiatric: Her behavior is normal. Mood normal.   Nursing note and vitals reviewed.      Assessment:     1. Cat bite  of right hand, initial encounter        Plan:       Cat bite of right hand, initial encounter  -     (In Office Administered) Tdap Vaccine  -     amoxicillin-clavulanate 875-125mg (AUGMENTIN) 875-125 mg per tablet; Take 1 tablet by mouth every 12 (twelve) hours. for 7 days  Dispense: 14 tablet; Refill: 0  -     mupirocin (BACTROBAN) 2 % ointment; Apply topically 3 (three) times daily. for 7 days  Dispense: 30 g; Refill: 0      Patient Instructions   Keep site clean (dial soap ideal)    Monitor site for increased redness, swelling, pain, decreased movement of hand, red-streaking, pus-like drainage, or fever and follow up with any of these.

## 2024-01-26 ENCOUNTER — PATIENT MESSAGE (OUTPATIENT)
Dept: INTERNAL MEDICINE | Facility: CLINIC | Age: 44
End: 2024-01-26
Payer: COMMERCIAL
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
## 2024-01-26 DIAGNOSIS — S61.451D CAT BITE OF RIGHT HAND, SUBSEQUENT ENCOUNTER: Primary | ICD-10-CM

## 2024-01-26 RX ORDER — DOXYCYCLINE 100 MG/1
100 CAPSULE ORAL 2 TIMES DAILY
Qty: 10 CAPSULE | Refills: 0 | Status: SHIPPED | OUTPATIENT
Start: 2024-01-26 | End: 2024-01-31

## 2024-01-26 RX ORDER — METRONIDAZOLE 500 MG/1
500 TABLET ORAL EVERY 8 HOURS
Qty: 15 TABLET | Refills: 0 | Status: SHIPPED | OUTPATIENT
Start: 2024-01-26 | End: 2024-01-31

## 2024-02-27 ENCOUNTER — HOSPITAL ENCOUNTER (OUTPATIENT)
Dept: RADIOLOGY | Facility: HOSPITAL | Age: 44
Discharge: HOME | End: 2024-02-27
Attending: INTERNAL MEDICINE
Payer: COMMERCIAL

## 2024-02-27 VITALS — WEIGHT: 185 LBS | HEIGHT: 59 IN | BODY MASS INDEX: 37.29 KG/M2

## 2024-02-27 DIAGNOSIS — Z12.31 ENCOUNTER FOR SCREENING MAMMOGRAM FOR BREAST CANCER: ICD-10-CM

## 2024-02-27 PROCEDURE — 77067 SCR MAMMO BI INCL CAD: CPT | Mod: TC

## 2024-02-27 PROCEDURE — 77063 BREAST TOMOSYNTHESIS BI: CPT | Mod: 26,,, | Performed by: RADIOLOGY

## 2024-02-27 PROCEDURE — 77067 SCR MAMMO BI INCL CAD: CPT | Mod: 26,,, | Performed by: RADIOLOGY

## 2024-03-04 ENCOUNTER — PATIENT MESSAGE (OUTPATIENT)
Dept: INTERNAL MEDICINE | Facility: CLINIC | Age: 44
End: 2024-03-04
Payer: COMMERCIAL

## 2024-03-13 ENCOUNTER — TELEPHONE (OUTPATIENT)
Dept: NEUROLOGY | Facility: CLINIC | Age: 44
End: 2024-03-13
Payer: COMMERCIAL

## 2024-03-13 NOTE — TELEPHONE ENCOUNTER
Called Pt to reschedule her April 26 appt. Pt's virtual visit has been rescheduled for April 23. I was unable to speak with her but left a vm.

## 2024-04-23 ENCOUNTER — OFFICE VISIT (OUTPATIENT)
Dept: NEUROLOGY | Facility: CLINIC | Age: 44
End: 2024-04-23
Payer: COMMERCIAL

## 2024-04-23 DIAGNOSIS — G43.009 MIGRAINE WITHOUT AURA AND WITHOUT STATUS MIGRAINOSUS, NOT INTRACTABLE: Primary | ICD-10-CM

## 2024-04-23 DIAGNOSIS — F34.89 OTHER SPECIFIED PERSISTENT MOOD DISORDERS: ICD-10-CM

## 2024-04-23 DIAGNOSIS — F41.9 ANXIETY: ICD-10-CM

## 2024-04-23 PROCEDURE — 99214 OFFICE O/P EST MOD 30 MIN: CPT | Mod: 95,,, | Performed by: NURSE PRACTITIONER

## 2024-04-23 PROCEDURE — G2211 COMPLEX E/M VISIT ADD ON: HCPCS | Mod: 95,,, | Performed by: NURSE PRACTITIONER

## 2024-04-23 PROCEDURE — 1160F RVW MEDS BY RX/DR IN RCRD: CPT | Mod: CPTII,95,, | Performed by: NURSE PRACTITIONER

## 2024-04-23 PROCEDURE — 1159F MED LIST DOCD IN RCRD: CPT | Mod: CPTII,95,, | Performed by: NURSE PRACTITIONER

## 2024-04-23 RX ORDER — SUMATRIPTAN SUCCINATE 100 MG/1
TABLET ORAL
Qty: 27 TABLET | Refills: 3 | Status: SHIPPED | OUTPATIENT
Start: 2024-04-23

## 2024-04-23 NOTE — PROGRESS NOTES
Established Patient   SUBJECTIVE:  Patient ID: Kelly Thomason   Chief Complaint: Follow-up    History of Present Illness:  Kelly Thomason is a 43 y.o. female who presents for follow-up of headaches via virtual visit.     The patient location is: in [place]   The chief complaint leading to consultation is: Follow-up  Visit type: Virtual visit with synchronous audio and video  Total time spent with patient: 5 min  Each patient to whom he or she provides medical services by telemedicine is:  (1) informed of the relationship between the physician and patient and the respective role of any other health care provider with respect to management of the patient; and (2) notified that he or she may decline to receive medical services by telemedicine and may withdraw from such care at any time.    Recommendations made at last Office Visit on 5/5/23:  - For migraine prevention - continue emgality 120 mg SQ monthly   - Should wear off effect worsen - could consider changing to ajovy   - For acute migraines - sumatriptan 100 mg   - Secondary option - ubrelvy 100 mg   - refills provided    - Continue tracking headaches   - Anxiety - stable on sertraline, management per PCP   - RTC in 1 year or sooner if needed    04/23/2024 - Interval History:  Migraines have been relatively stable, reports she has been waking up with a mild headache for the last month or so, headache will spontaneously resolve.  She has continued emgality 120 mg SQ monthly for migraine prevention.  She continues to feel the wear off effect to emgality in the week prior to when her next injection is due.  Has multiple migraines that week.  However, is nearly migraine free for 3 weeks after her injection.  At this time, she does not wish to make adjustments to her treatment plan.     Otherwise, information below is still accurate and current.     05/05/2023 - Interval History:  Migraines have responded well to switch from aimovig to emgality.  Has not taken any  ubrelvy in nearly 7 months.  She is for the most part migraine free until 3-4 days before she is due for her next injection, during this time she will have mild headaches.  She has been easily aborting her headaches with 1/2 tab sumatriptan 100 mg.  She is pleased with the current state of her migraines, does not wish to make adjustments to her treatment plan at this time.      Otherwise, information below is still accurate and current.      05/06/2022 - Interval History:  Migraines continue to be slightly more frequent than she would like over the last few months.  She typically experiences 1-2 mild migraines per week.  Lasting only hours not all day in duration.  She has continued using Aimovig 140 mg SQ monthly for migraine prevention, which she does feel is helping in that her migraines are no where near as bad as they were in the past.  She feels the benefits of Aimovig are gradually diminishing, would like to consider alternative treatment options.      Otherwise, information below is still accurate and current.      02/04/2022 - Interval History:  Generally speaking migraines have remained well controlled over the last 6 months.  Did experience a slight uptick in migraine frequency in December, sent message via patient portal stating sumatriptan/naproxen had not been working, was given new prescription for ubrelvy 100 mg tabs, unfortunately it took sometime for insurance to approve ubrelvy, by the time she picked up prescription from pharmacy her migraine had subsided.  She has not tried ubrelvy yet as for all subsequent migraines sumatriptan/naproxen has been effective.  Migraines returned to baseline frequency in January. Reports she is currently suffering with 3-4 headaches per month, migraines she does experience are not debilitating, she does not consider migraines to be severe.  More often than not sumatriptan 100 mg + naproxen is effective in aborting her migraines.       Otherwise, information below  "is still accurate and current.      08/06/2021 - Interval History:  She has been suffering with a cold, sinus congestion, coughing for the last 3 weeks, which has subsequently triggered headaches and pressure.   Outside of the last few weeks, migraines have been well controlled.  She has continued using Aimovig 140 mg SQ monthly for migraine prevention which has been helping.  Treats acute migraines with sumatriptan 100 mg tabs.  She is pleased with the current state of her migraines, does not wish to make adjustments to her treatment plan at this time.      Otherwise, information below is still accurate and current.      06/25/2021 - Interval History:  Aimovig was increased to 140 mg dose on 5/7/21 due to increased frequency of migraines.  She does feel increasing her dose of Aimovig has helped some, however the increased heat seems to trigger more frequent migraines.  She has been focusing on increasing her hydration.  Currently suffering with a migraine once every 1-2 weeks.  Feels migraines triggered by heat are more difficult to treat.  Typically 3-4 migraines per month.  Treats acute migraines with sumatriptan 100 mg tabs, which helps, however can have migraines persist for more than 1 day in duration.     Otherwise, information below is still accurate and current.      02/12/2021 - Interval History:  Migraines continue to be well controlled on Aimovig 70 mg SQ monthly, she has begun to notice the week she is due for her injection she begins to get mild headaches, no full blown migraines which she is very pleased about!  Despite experiencing mild headaches in the days leading up to her next dose of aimovig, she does not wish to make any adjustments to her treatment plan at this time.       Otherwise, information below is still accurate and current.      12/02/2019 - Interval History:  Migraines are "great", has only been experiencing 1-2 migraines per month, most of which are resolved by excedrin, occasionally " "has to take sumatriptan.  She picked up a 3 month supply of sumatriptan last January and still has 10 pills left!  She has been using Aimovig 70 mg SQ monthly, which has been very effective. She does experiencing itching to the injection site, which is relieved by ice.  She is very happy with the current state of her migraines and does not wish to make any further adjustments to her treatment plan at this time.       Otherwise, information below is still accurate and current.      01/03/2019 - Interval History:  Headaches and migraines have begun to occur more frequently, despite Botox injections, she no longer feels Botox is helping her migraines and wants to discuss changing her migraine prevention.  She has tried multiple oral medications as well as Botox without significant relief.  She is interested in trying a CGRP inhibitor for migraine prevention as well as discontinuing Botox injections. Migraines continue to feel the same as they always have, she denies any change in the quality or nature of her migraines.       11/13/2018- Interval History:   presents to clinic for third round of Botox, after her second round of Botox she noticed a decrease in the frequency of her migraines, admits she has been under increased stress over the last few weeks and migraines have not been significantly more frequent, which they would have been prior to beginning Botox injections.  Prior to beginning Botox, she was suffering with 1-2 debilitating migraines per week, over the last twelve weeks she feels she has only had 1-2 debilitating migraines and "a couple" moderate to severe migraines.  Sumatriptan continues to give her good relief, on only one occasion sumatriptan has not been effective following the first dose.  She has questions regarding alternative treatment options including Aimovig, Frova and Cambia.  Discussed with regards to Aimovig, I would prefer to have her continue with Botox injections, as they have been " quite effective in minimizing the burden of her migraines, for at least 3 rounds.  If after round 3 she would like to stop Botox and consider alternative options I would be agreeable to this plan.  I would not recommend her use Frova for migraine abortive as the onset of action is quite slow and her migraines intensify to peak fairly quickly.  Cambia could be used to supplement Sumatriptan in place of Naprosyn, however after learning Cambia is similar to Naprosyn, she feels more comfortable with continuing with use of Naprosyn.       08/27/2018- Interval History:  She does feel migraine frequency has decreased after first dose of Botox, also feels migraines are more responsive to medication.  She denies any presence of side effects from Botox injections and wishes to continue with treatment at this time.      06/04/2018- Interval History:  Zomig did not give her any relief, Sumatriptan gives her more relief.  Prednisone did help slightly to break up headache cycle.  She has only been taking Sumatriptan 1-2 times per week.  She continues to state she does not wish to add daily medication to prevention.  Headaches have not changed in nature or quality.  Risks, Benefits, and potential side effects of Botox discussed with patient.  Alternative treatments offered.  After thorough discussed regarding the procedure, patient has decided to move forward with initiating Botox injections for Chronic Migraine.  Patient understands we will complete 3 rounds of injections, if after 3 rounds, we do not see a 50% reduction in headaches, we will discontinue Botox injections.      04/23/2018 - Interval History:  At last appointment with Dr. Holm in January 2017, it was suggested  take 1/2 tab Sumatriptan 100 mg with 1 1/2 tabs aleve for migraine abortive.  For a while her migraines had been great, states she only rarely expereinced any migraines during pregnancy as well as while she was breast feeding.  She stopped breast  feeding last summer and migraines have since gradually become more frequent.  She reports having a headache nearly everyday with full blown migraines occurring 1-2 times per week, each lasting a full day.  She has tried using sumatriptan with aleve, however she does not find this effective.  She has suffered from migraines since she was 14 years old, she denies any change in the quality or nature of her headaches, this is how they have always been, just becoming more frequent.  Migraine is described as a pounding pain typically located behind her eye (can be either right or left sided) and temple, radiating into the neck.  Denies presence of aura symptoms.  Migraines are associated with nausea, photophobia, phonophobia.  Worsened with movement/activity.  Rates pain from 6-7 out of 10.  Additionally, she has noticed when she gets a migraine while on her menstrual cycle, the intensity is much worse, headache is harder to break, and duration is significantly longer, these headaches come the week before her menstrual cycle.  She is currently on Zoloft for prevention, but has tried numerous preventive and abortive options in the past (listed below).    Additionally, she has secondary complaints of intermittent lateral double vision which only occurs when she is either driving or watching tv.  She has noticed it typically only occurs after she has been watching tv for a while or has been driving for a good bit.  She believes the double vision resolves after she gets out of her car or walks away from the television but is unsure.  She has seen her optometrist who did not find any ocular cause for these symptoms.  She feels this has been going on for over a year.  When she gets double vision, if she closes on of her eyes these symptoms will resolve.      Notes from Dr. Holm:  History: Patient presents for follow-up for her migraine headaches.  Insurance will no longer cover Treximet, so she would like to have  sumatriptan prescription and be able to add Aleve to it.  She typically takes half tablet of the Treximet.  We will give her the sumatriptan 100 mg which she can take half a tablet of in 1-1-1/2 Aleve with it.  Patient had been pregnant with no headaches for a while.  She has had increased frequency in the last 6 months with 2-3 headaches per month with good response to the Treximet.  She had tried that Cephaly  device but complains she doesn't have time to 2 minutes with a 2-year-old.  Previous note: 10-3-13: This is a 33-year-old white female who presents for followup for her migraine headaches. Patient was previously seen by Dr. Nicolle Chatman who is no longer here. Patient describes a 20 year history of migraine headaches. She describes right or left temporal pain radiating to the hand eye causing aching/deep pain in the eye radiating into the neck. Headache intensity is 5-6/10 associated with nausea, photophobia, sonophobia and lasting all day. The patient has no aura. She generally takes a quarter tablet of Treximet with relief in 30-60 minutes. She takes Cymbalta 30 mg and nortriptyline 25 mg h.s. for migraine prevention. Overall she has been doing quite well with this medication regimen and is here predominantly to establish with a new neurologist.       Therapies Tried & Response:   Pamelor - unsure   Treximet - helped   Cefaly -   Sumatriptan -   Cymbalta - unsure  Zoloft -   Tramadol -   Excedrin -   Effexor - unsure   Topiramate - unsure, cannot try again hx of kidney stone  Zomig NS -   Zomig PO -   Lamictal -   Elavil - weight gain   Magnesium -   relpax -   Paxil - no   Midrin - ?  Darvocet -   Nadolol  Prednisone -    Baclofen -   Zomig PO -   Botox -helping   Aimovig - effective, losing efficacy    Ubrelvy -   emgality - helping     Current Medications:    galcanezumab-gnlm 120 mg/mL Alireza, Inject 120 mg into the skin every 28 days. Begin 28 days after loading dose., Disp: 1 mL, Rfl: 11    sertraline  (ZOLOFT) 100 MG tablet, TAKE 1 TABLET BY MOUTH ONCE DAILY, Disp: 90 tablet, Rfl: 3    sumatriptan (IMITREX) 100 MG tablet, Take 1 tablet at onset of migraine, can repeat in 2 hrs if needed. Max 2 tabs/day. Max 3 days/week., Disp: 27 tablet, Rfl: 3    ubrogepant (UBRELVY) 100 mg tablet, Take 1 tablet (100 mg total) by mouth every 2 (two) hours as needed for Migraine. Max 200 mg/day., Disp: 10 tablet, Rfl: 5    Current Facility-Administered Medications:     etonogestreL subdermal device 68 mg, 68 mg, , , Paola Tony S., NP, 68 mg at 05/21/20 1040    etonogestreL subdermal device 68 mg, 68 mg, Implant, , Paola Tony S., NP, 68 mg at 05/02/23 0920    Review of Systems - A review of 10+ systems was conducted with pertinent positive and negative findings documented in HPI with all other systems reviewed and negative.    PFSH: Past medical, family, and social history reviewed as documented in chart with pertinent positive medical, family, and social history detailed in HPI.    OBJECTIVE:  Vitals: There were no vitals taken for this visit.     Physical Exam:  Constitutional: she appears well-developed and well-nourished. she is well groomed. NAD.     Review of Data:   Notes from urgent care, internal medicine reviewed   Labs:  No visits with results within 6 Month(s) from this visit.   Latest known visit with results is:   Lab Visit on 06/30/2023   Component Date Value Ref Range Status    WBC 06/30/2023 5.42  3.90 - 12.70 K/uL Final    RBC 06/30/2023 4.74  4.00 - 5.40 M/uL Final    Hemoglobin 06/30/2023 14.3  12.0 - 16.0 g/dL Final    Hematocrit 06/30/2023 43.2  37.0 - 48.5 % Final    MCV 06/30/2023 91  82 - 98 fL Final    MCH 06/30/2023 30.2  27.0 - 31.0 pg Final    MCHC 06/30/2023 33.1  32.0 - 36.0 g/dL Final    RDW 06/30/2023 13.4  11.5 - 14.5 % Final    Platelets 06/30/2023 292  150 - 450 K/uL Final    MPV 06/30/2023 9.6  9.2 - 12.9 fL Final    Immature Granulocytes 06/30/2023 0.2  0.0 - 0.5 % Final    Gran # (ANC)  06/30/2023 3.2  1.8 - 7.7 K/uL Final    Immature Grans (Abs) 06/30/2023 0.01  0.00 - 0.04 K/uL Final    Lymph # 06/30/2023 1.6  1.0 - 4.8 K/uL Final    Mono # 06/30/2023 0.5  0.3 - 1.0 K/uL Final    Eos # 06/30/2023 0.1  0.0 - 0.5 K/uL Final    Baso # 06/30/2023 0.02  0.00 - 0.20 K/uL Final    nRBC 06/30/2023 0  0 /100 WBC Final    Gran % 06/30/2023 58.6  38.0 - 73.0 % Final    Lymph % 06/30/2023 29.5  18.0 - 48.0 % Final    Mono % 06/30/2023 9.6  4.0 - 15.0 % Final    Eosinophil % 06/30/2023 1.7  0.0 - 8.0 % Final    Basophil % 06/30/2023 0.4  0.0 - 1.9 % Final    Differential Method 06/30/2023 Automated   Final    Cholesterol 06/30/2023 184  120 - 199 mg/dL Final    Triglycerides 06/30/2023 123  30 - 150 mg/dL Final    HDL 06/30/2023 52  40 - 75 mg/dL Final    LDL Cholesterol 06/30/2023 107.4  63.0 - 159.0 mg/dL Final    HDL/Cholesterol Ratio 06/30/2023 28.3  20.0 - 50.0 % Final    Total Cholesterol/HDL Ratio 06/30/2023 3.5  2.0 - 5.0 Final    Non-HDL Cholesterol 06/30/2023 132  mg/dL Final    Sodium 06/30/2023 142  136 - 145 mmol/L Final    Potassium 06/30/2023 4.0  3.5 - 5.1 mmol/L Final    Chloride 06/30/2023 110  95 - 110 mmol/L Final    CO2 06/30/2023 18 (L)  23 - 29 mmol/L Final    Glucose 06/30/2023 93  70 - 110 mg/dL Final    BUN 06/30/2023 14  6 - 20 mg/dL Final    Creatinine 06/30/2023 0.9  0.5 - 1.4 mg/dL Final    Calcium 06/30/2023 9.8  8.7 - 10.5 mg/dL Final    Total Protein 06/30/2023 7.1  6.0 - 8.4 g/dL Final    Albumin 06/30/2023 4.0  3.5 - 5.2 g/dL Final    Total Bilirubin 06/30/2023 0.4  0.1 - 1.0 mg/dL Final    Alkaline Phosphatase 06/30/2023 88  55 - 135 U/L Final    AST 06/30/2023 20  10 - 40 U/L Final    ALT 06/30/2023 28  10 - 44 U/L Final    eGFR 06/30/2023 >60.0  >60 mL/min/1.73 m^2 Final    Anion Gap 06/30/2023 14  8 - 16 mmol/L Final    Hemoglobin A1C 06/30/2023 5.3  4.0 - 5.6 % Final    Estimated Avg Glucose 06/30/2023 105  68 - 131 mg/dL Final    TSH 06/30/2023 2.457  0.400 - 4.000  uIU/mL Final     Imaging:  No results found for this or any previous visit.  Note: I have independently reviewed any/all imaging/labs/tests and agree with the report (s) as documented.  Any discrepancies will be as noted/demarcated by free text.  ARMAAN URIBE 4/23/2024    ASSESSMENT:  1. Migraine without aura and without status migrainosus, not intractable    2. Anxiety    3. Other specified persistent mood disorders      PLAN:  - For migraine prevention - continue emgality 120 mg SQ monthly   - Offered to switch emgality to ajovy given longer half-life, she declined but will consider in the future   - For acute migraines - sumatriptan 100 mg   - Secondary treatment option - ubrelvy 100 mg   - refills provided   - Continue tracking headaches   - Anxiety - continue sertraline 100 mg daily, management per PCP - will avoid use of anti-depressants for migraine prevention  - RTC in 6-12 months or sooner if needed    Orders Placed This Encounter    galcanezumab-gnlm 120 mg/mL PnIj    sumatriptan (IMITREX) 100 MG tablet    ubrogepant (UBRELVY) 100 mg tablet     Questions and concerns were sought and answered to the patient's stated verbal satisfaction.  The patient verbalizes understanding and agreement with the above stated treatment plan.     Visit today included increased complexity associated with the care of the episodic problem migraine without aura addressed and managing the longitudinal care of the patient due to the serious and/or complex managed problem(s).  Plan for patient to return to clinic in 6-12 months for follow-up visit.     CC: Christiano Hurst MD Elizabeth C. Vulevich, FNP-C  [facility]   [phone]    Dr. Perez was available during today's encounter.

## 2024-05-09 DIAGNOSIS — F41.9 ANXIETY: ICD-10-CM

## 2024-05-13 RX ORDER — SERTRALINE HYDROCHLORIDE 100 MG/1
TABLET, FILM COATED ORAL
Qty: 90 TABLET | Refills: 1 | Status: SHIPPED | OUTPATIENT
Start: 2024-05-13

## 2024-05-31 NOTE — PROGRESS NOTES
[facility]  New Patient H&P    Referring Provider: Christiano Hurst Md  [address] [place] [address]    Chief Complaint:   Chief Complaint   Patient presents with    Mid-back Pain       History of Present Illness: Kelly Thomason is a 39 y.o. female referred by Dr. Christiano Hurst for Chronic bilateral thoracic back pain.      Neck pain has been present 5-6 months.  No clear inciting incident.  Pain is localized to the lower cervical spine and between the shoulder blades with no radiation into the arms. Pain is intermittent and described as aching, burning, deep and occasionally sharp. The pain is aggravated by lying down on her side (either side). The pain is alleviated by heat, ice, massage, . Denies weakness or numbness in her arms or legs. Denies changes in bowel or bladder function. Denies saddle anesthesia. Denies recent fevers or infections. Denies significant weight loss. Pain limits her ability to get comfortable and fall asleep.    Onset: 5-6 months ago   Location: Lower cervical spine  Radiation: in neck and shoulders  Timing: intermittent  Quality: Aching, Burning, Deep and Sharp  Exacerbating Factors: lying down and sitting  Alleviating Factors: heat, ice, massage and medications    Severity: Currently: 4/10   Typical Range: 3-7/10     Exacerbation: 7/10     Pain Disability Index  Family/Home Responsibilities:: 4  Recreation:: 4  Social Activity:: 4  Occupation:: 4  Sexual Behavior:: 4  Self Care:: 4  Life-Support Activities:: 4  Pain Disability Index (PDI): 28    Previous Interventions:  - None    Previous Therapies:  PT/OT: no   Surgery: no   Chiropractor: Didn't help  TENS unit: Helps  Previous Medications:   - NSAIDS: Aleve   - Muscle Relaxants: none   - TCAs:   - SNRIs:   - Topicals: Biofreeze helps temporarily   - Anticonvulsants:    - Opioids:     Current Pain Medications:  1. Aleve     Blood Thinners: denies    Full Medication List:    Current Outpatient Medications:  PRE-OPERATIVE NOTE   Chief Complaint had concerns including Office Visit and Pre-Op Exam.  Surgery Date: 06/13/2024  Planned Surgery: RIGHT LUMBAR MICRODISCECTOMY LUMBAR 4 - 5 (OPEN)  Type of Anesthesia: General  Pre-op Diagnosis: Lumbar radiculopathy [M54.16]  Requesting Surgeon: Waldemar Adan MD    Subjective   BRIEF HISTORY LEADING to SURGERY: Alexandra Pineda is a 54 year old female here for pre-operative anesthesia consult for surgery as requested by the surgeon.     Review of Systems  A complete review of systems was performed and negative except as documented above.    Objective   PHYSICAL EXAM  Vitals:    05/31/24 0958   BP: 138/78   Pulse: 90   SpO2: 98%   Weight: 114.7 kg (252 lb 13.9 oz)   Height: 5' 8\" (1.727 m)     Physical Exam  Vitals and nursing note reviewed.   Constitutional:       General: She is not in acute distress.     Appearance: Normal appearance. She is not ill-appearing.   HENT:      Head: Normocephalic and atraumatic.      Right Ear: Tympanic membrane, ear canal and external ear normal. There is no impacted cerumen.      Left Ear: Tympanic membrane, ear canal and external ear normal. There is no impacted cerumen.      Nose: Nose normal. No congestion or rhinorrhea.      Mouth/Throat:      Mouth: Mucous membranes are moist.      Pharynx: No oropharyngeal exudate or posterior oropharyngeal erythema.      Neck: Normal range of motion.   Eyes:      General:         Right eye: No discharge.      Conjunctiva/sclera: Conjunctivae normal.   Neck:      Vascular: No carotid bruit.      Comments: Murmur radiates to carotid  Cardiovascular:      Rate and Rhythm: Normal rate and regular rhythm.      Heart sounds: Murmur heard.   Pulmonary:      Effort: Pulmonary effort is normal. No respiratory distress.      Breath sounds: Normal breath sounds. No stridor.   Abdominal:      General: Abdomen is flat.      Palpations: Abdomen is soft.      Comments: Mild diffuse tenderness   Musculoskeletal:           erenumab-aooe (AIMOVIG AUTOINJECTOR) 70 mg/mL injection, Inject 1 mL (70 mg total) into the skin every 28 days., Disp: 2 mL, Rfl: 11    sertraline (ZOLOFT) 100 MG tablet, Take 1 tablet (100 mg total) by mouth once daily., Disp: 90 tablet, Rfl: 3    sumatriptan (IMITREX) 100 MG tablet, Take 1 tab at onset of migraine, can repeat in 2 hrs if needed. Max 2 tabs/day. Max 3 days/week., Disp: 27 tablet, Rfl: 3    tiZANidine (ZANAFLEX) 4 MG tablet, Take 1 tablet (4 mg total) by mouth nightly as needed., Disp: 30 tablet, Rfl: 2    Current Facility-Administered Medications:     etonogestrel Impl 68 mg, 68 mg, Subdermal, Once, Paola Tony NP     Review of Systems:  Review of Systems   Constitutional: Negative for fever and weight loss.   HENT: Negative for ear pain and tinnitus.    Eyes: Negative for pain and redness.   Respiratory: Negative for cough and shortness of breath.    Cardiovascular: Negative for chest pain and palpitations.   Gastrointestinal: Negative for constipation and heartburn.   Genitourinary: Negative.         Denies urinary incontinence. Denies urine retention.    Musculoskeletal: Positive for back pain, myalgias and neck pain.   Skin: Negative for itching and rash.   Neurological: Negative for tingling, focal weakness and seizures.   Endo/Heme/Allergies: Negative for environmental allergies. Does not bruise/bleed easily.   Psychiatric/Behavioral: Negative for depression. The patient has insomnia. The patient is not nervous/anxious.        Allergies:  Percocet [oxycodone-acetaminophen]     Medical History:   has a past medical history of Abnormal Pap smear of cervix, Depression, Head ache, Hives, Kidney stones, and Urticaria.    Surgical History:   has a past surgical history that includes [place] tooth extraction and Kidney stone surgery.    Family History:  family history includes Breast cancer in her paternal grandmother; Colon cancer in her paternal grandmother; Hyperlipidemia in her  Right lower leg: No edema.      Left lower leg: No edema.   Lymphadenopathy:      Cervical: No cervical adenopathy.   Skin:     General: Skin is warm and dry.      Coloration: Skin is not jaundiced or pale.   Neurological:      General: No focal deficit present.      Mental Status: She is alert and oriented to person, place, and time.      Cranial Nerves: No cranial nerve deficit.      Sensory: No sensory deficit.   Psychiatric:         Mood and Affect: Mood normal.         Behavior: Behavior normal.         Thought Content: Thought content normal.         Judgment: Judgment normal.             RESULTS REVIEW         Most Recent  Na+  141 (5/30/2024)  K+  3.6 (5/30/2024)   Glucose  142 (5/30/2024)  GFR  >90 (5/30/2024)        Most Recent  WBC  8.6 (5/30/2024)  HGB  14.1 (5/30/2024)     Platelet  333 (5/30/2024)               Medications, allergies, past medical, surgical, social and family histories were reviewed and updated as appropriate, see encounter summary. Pertinent surgical risks and history are below.  SURGICAL RISK AND SCREENINGS     Family History Risks  Adverse Reaction to Anesthesia: No  Bleeding or Blood Disorder: No  Malignant Hyperthermia: No    Personal Surgical History  Anesthetic Complications: No  Bleeding Problems: No  Problems with Surgery: No       Malnutrition Screening Tool Current weight 253 pounds  Have you recently lost weight without trying? No  Have you been eating poorly because of a decreased appetite? No  Score = 0, Not at Risk       Functional Capacity  Are you able to do light housework, dusting, wash dishes, climb a flight of stairs or walk up a hill without chest pain or problems breathing (>4 METS)? Yes    Revised Cardiac Risk Index  Intermediate or Higher Risk Surgery Yes   History of Ischemic Heart Disease or Cardiac Chest Pain No   Congestive Heart Failure No; Ejection Fraction 60 (05/30/2024)   History of Stroke or TIA No   Last Creatinine >2 No; 0.75 g/dL (05/30/2024)    Prescribed Insulin No   Estimated Risk of a Major Cardiac Complication 1 risk factor = 6.0%       Acute Kidney Injury Prevention:  Patient has no known HALIMA risk factors.    Advance care planning documents on file - no     ASSESSMENT AND PLAN        1. Preop cardiovascular exam  2. Cardiac murmur  3. Fatty liver  4. Numbness and tingling of both legs  5. Lumbosacral radiculopathy  6. Class 2 obesity due to excess calories without serious comorbidity with body mass index (BMI) of 38.0 to 38.9 in adult    Preop Optimization  - OPTIMIZED for SURGERY: YES patient is medically optimized for surgery.  - Workup reviewed and/or ordered: no additional workup needed  - Pre-Op management of pacemaker, dialysis or steroids: not needed.  - Post-Op DVT prophylaxis: per surgical team  - Smoking Status: Never Smoker  .     Pertinent Conditions       Medications to Hold   Medications and Supplements:  - Morning of surgery hold any Vitamins AND for 2 weeks prior hold any Vitamin E or Herbals     Anticoagulation:none on med list    Antidiabetic:none on med list    HALIMA Risk (Diuretics, ACE-I/ARB, NSAIDs):   - 5 days before surgery hold (ibuprofen (MOTRIN) 600 MG tablet [[id number]])      Continue all other medications unless an alternative plan was advised with the surgeon and/or specialist.      Follow Up  Return if ill prior to procedure.    BRENDEN Anna  Copy sent to: Waldemar Adan MD            father and mother.    Social History:   reports that she has never smoked. She has never used smokeless tobacco. She reports that she drinks alcohol. She reports that she does not use drugs.    Physical Exam:  BP (!) 149/83   Pulse 88   Wt 70.1 kg (154 lb 10.4 oz)   BMI 31.24 kg/m²   GEN: No acute distress. Calm, comfortable  HENT: Normocephalic, atraumatic, moist mucous membranes  EYE: Anicteric sclera, non-injected.   CV: Non-diaphoretic. Regular Rate. Radial Pulses 2+.  RESP: Breathing comfortably. Chest expansion symmetric.  EXT: No clubbing, cyanosis.   SKIN: Warm, & dry to palpation. No visible rashes or lesions of exposed skin.   PSYCH: Pleasant mood and appropriate affect. Recent and remote memory intact.   GAIT: Independent, normal ambulation  Neck Exam:       Inspection: No erythema, bruising. Poor posture with anterior rolled shoulders. No surgical incision.      Palpation: (+) TTP of midline spinous processes mostly and slight TTP in bilateral trapezius, rhomboids, levators, and cervical paraspinals      ROM: No significant Limitation in flexion, extension, lateral bending or rotation.       Provocative Maneuvers:  (-) Spurling's bilaterally  Neurologic Exam:     Alert. Speech is fluent and appropriate.     Cranial Nerves: Extra-ocular movements intact. Pupils equal. No strabismus. Face Symmetric. Jaw opens midline. Uvula midline. Tongue midline. Shoulder shrug symmetric.       Strength:  5/5 throughout bilateral upper & lower extremities     Sensation:  Grossly intact to light touch in bilateral upper & lower extremities     Reflexes: 3+ in b/l patella, achilles, biceps, brachioradialis, 2+ in b/l triceps     Tone: No abnormality appreciated in bilateral upper or lower extremities     (+) Sibley bilaterally     Downgoing toes on plantar stimulation     1-2 beats of clonus bilaterally    Imaging:  - MRI THORACIC SPINE WITHOUT CONTRAST  Vertebral body heights and alignment are within normal  limits.  Intervertebral disc spaces are preserved.  Marrow signal is within normal limits with no evidence of fracture or marrow replacement process.  Paraspinous soft tissues appear within normal limits.  Cord maintains normal contour and signal intensity.  No significant spinal canal stenosis or neural foraminal narrowing throughout the visualized thoracic spine.  Surrounding soft tissue structures including the partially visualized liver and kidneys are unremarkable.       Labs:  BMP  Lab Results   Component Value Date     07/06/2019    K 4.6 07/06/2019     07/06/2019    CO2 26 07/06/2019    BUN 17 07/06/2019    CREATININE 0.8 07/06/2019    CALCIUM 9.3 07/06/2019    ANIONGAP 6 (L) 07/06/2019    ESTGFRAFRICA >60 07/06/2019    EGFRNONAA >60 07/06/2019     Lab Results   Component Value Date    ALT 10 10/30/2017    AST 16 10/30/2017    ALKPHOS 104 10/30/2017    BILITOT 0.5 10/30/2017     Lab Results   Component Value Date     10/30/2017       Assessment:  Kelly Thomason is a 39 y.o. female with the following diagnoses based on history, exam, and imaging:    Problem List Items Addressed This Visit     None      Visit Diagnoses     Sibley's reflex positive    -  Primary    Relevant Medications    tiZANidine (ZANAFLEX) 4 MG tablet    Other Relevant Orders    X-Ray Cervical Spine 5 View W Flex Extxt (Completed)    MRI Cervical Spine Without Contrast    Neck pain        Relevant Medications    tiZANidine (ZANAFLEX) 4 MG tablet    Other Relevant Orders    X-Ray Cervical Spine 5 View W Flex Extxt (Completed)    Ambulatory consult to Physical Therapy    MRI Cervical Spine Without Contrast    Myofascial pain        Relevant Medications    tiZANidine (ZANAFLEX) 4 MG tablet    Other Relevant Orders    Ambulatory consult to Physical Therapy    Poor posture        Relevant Medications    tiZANidine (ZANAFLEX) 4 MG tablet    Other Relevant Orders    Ambulatory consult to Physical Therapy    Insomnia secondary  to chronic pain        Relevant Medications    tiZANidine (ZANAFLEX) 4 MG tablet    Hyperreflexia        Relevant Orders    MRI Cervical Spine Without Contrast          This is a pleasant 39 y.o. lady with chronic neck pain. Pain appears mostly myofascial in origin and starts around C7 and radiates down in between the shoulder blades.  On exam, she has positive Lynda reflex bilaterally, diffusely brisk reflexes, otherwise she has a completely normal neurologic exam.  She did note that she was previously told she had chiari malformation after brain MRI, but no brain MRI in our system.    Treatment Plan: I discussed with the patient the following assessment and recommendations. The following is the plan the patient agreed upon:  - PT/OT/HEP:  Referral to physical therapy, internal, for neck pain, postural retraining, modalities  - Procedures:  Consider trigger point injections in the future  - Medications:  Start tizanidine 2-4 mg q.h.s. as needed.  Advised her to be cautious with this medication as it can be very sedating.  Also discussed other side effects, such as dry mouth and low blood pressure/dizziness.  - Imaging: Reviewed.  Will order cervical x-ray and cervical MRI to assess hyperreflexia.  - Labs: Reviewed.  Medications are appropriately dosed for current hepatorenal function.    Follow Up: RTC in 2-3 months    Kelly Jean M.D.  Interventional Pain Medicine / Physical Medicine & Rehabilitation    Disclaimer: This note was partly generated using dictation software which may occasionally result in transcription errors.

## 2024-07-19 ENCOUNTER — TELEPHONE (OUTPATIENT)
Dept: INTERNAL MEDICINE | Facility: CLINIC | Age: 44
End: 2024-07-19
Payer: COMMERCIAL

## 2024-07-19 DIAGNOSIS — Z00.00 ROUTINE PHYSICAL EXAMINATION: Primary | ICD-10-CM

## 2024-07-19 NOTE — TELEPHONE ENCOUNTER
Called pt; pt answered    Pt wanted to schedule annual fasting labwork just before appt with OMER Schwarz     Scheduled annual labs as per pt request

## 2024-07-22 ENCOUNTER — LAB VISIT (OUTPATIENT)
Dept: LAB | Facility: HOSPITAL | Age: 44
End: 2024-07-22
Payer: COMMERCIAL

## 2024-07-22 ENCOUNTER — OFFICE VISIT (OUTPATIENT)
Dept: INTERNAL MEDICINE | Facility: CLINIC | Age: 44
End: 2024-07-22
Payer: COMMERCIAL

## 2024-07-22 VITALS
DIASTOLIC BLOOD PRESSURE: 84 MMHG | OXYGEN SATURATION: 97 % | BODY MASS INDEX: 37.74 KG/M2 | SYSTOLIC BLOOD PRESSURE: 110 MMHG | HEART RATE: 91 BPM | HEIGHT: 59 IN | WEIGHT: 187.19 LBS

## 2024-07-22 DIAGNOSIS — Z00.00 ROUTINE PHYSICAL EXAMINATION: ICD-10-CM

## 2024-07-22 DIAGNOSIS — Z00.00 ANNUAL PHYSICAL EXAM: Primary | ICD-10-CM

## 2024-07-22 DIAGNOSIS — Z12.11 COLON CANCER SCREENING: ICD-10-CM

## 2024-07-22 DIAGNOSIS — E66.09 CLASS 2 OBESITY DUE TO EXCESS CALORIES WITHOUT SERIOUS COMORBIDITY WITH BODY MASS INDEX (BMI) OF 37.0 TO 37.9 IN ADULT: ICD-10-CM

## 2024-07-22 DIAGNOSIS — R10.13 DYSPEPSIA: ICD-10-CM

## 2024-07-22 LAB
ALBUMIN SERPL BCP-MCNC: 3.8 G/DL (ref 3.5–5.2)
ALP SERPL-CCNC: 95 U/L (ref 55–135)
ALT SERPL W/O P-5'-P-CCNC: 23 U/L (ref 10–44)
ANION GAP SERPL CALC-SCNC: 11 MMOL/L (ref 8–16)
AST SERPL-CCNC: 21 U/L (ref 10–40)
BASOPHILS # BLD AUTO: 0.03 K/UL (ref 0–0.2)
BASOPHILS NFR BLD: 0.5 % (ref 0–1.9)
BILIRUB SERPL-MCNC: 0.7 MG/DL (ref 0.1–1)
BUN SERPL-MCNC: 18 MG/DL (ref 6–20)
CALCIUM SERPL-MCNC: 9.5 MG/DL (ref 8.7–10.5)
CHLORIDE SERPL-SCNC: 109 MMOL/L (ref 95–110)
CHOLEST SERPL-MCNC: 183 MG/DL (ref 120–199)
CHOLEST/HDLC SERPL: 3.5 {RATIO} (ref 2–5)
CO2 SERPL-SCNC: 19 MMOL/L (ref 23–29)
CREAT SERPL-MCNC: 0.9 MG/DL (ref 0.5–1.4)
DIFFERENTIAL METHOD BLD: NORMAL
EOSINOPHIL # BLD AUTO: 0.1 K/UL (ref 0–0.5)
EOSINOPHIL NFR BLD: 1.9 % (ref 0–8)
ERYTHROCYTE [DISTWIDTH] IN BLOOD BY AUTOMATED COUNT: 13 % (ref 11.5–14.5)
EST. GFR  (NO RACE VARIABLE): >60 ML/MIN/1.73 M^2
ESTIMATED AVG GLUCOSE: 103 MG/DL (ref 68–131)
GLUCOSE SERPL-MCNC: 89 MG/DL (ref 70–110)
HBA1C MFR BLD: 5.2 % (ref 4–5.6)
HCT VFR BLD AUTO: 43.8 % (ref 37–48.5)
HDLC SERPL-MCNC: 53 MG/DL (ref 40–75)
HDLC SERPL: 29 % (ref 20–50)
HGB BLD-MCNC: 14.5 G/DL (ref 12–16)
IMM GRANULOCYTES # BLD AUTO: 0.02 K/UL (ref 0–0.04)
IMM GRANULOCYTES NFR BLD AUTO: 0.3 % (ref 0–0.5)
LDLC SERPL CALC-MCNC: 96.4 MG/DL (ref 63–159)
LYMPHOCYTES # BLD AUTO: 1.9 K/UL (ref 1–4.8)
LYMPHOCYTES NFR BLD: 33.2 % (ref 18–48)
MCH RBC QN AUTO: 30.8 PG (ref 27–31)
MCHC RBC AUTO-ENTMCNC: 33.1 G/DL (ref 32–36)
MCV RBC AUTO: 93 FL (ref 82–98)
MONOCYTES # BLD AUTO: 0.5 K/UL (ref 0.3–1)
MONOCYTES NFR BLD: 7.9 % (ref 4–15)
NEUTROPHILS # BLD AUTO: 3.2 K/UL (ref 1.8–7.7)
NEUTROPHILS NFR BLD: 56.2 % (ref 38–73)
NONHDLC SERPL-MCNC: 130 MG/DL
NRBC BLD-RTO: 0 /100 WBC
PLATELET # BLD AUTO: 316 K/UL (ref 150–450)
PMV BLD AUTO: 9.6 FL (ref 9.2–12.9)
POTASSIUM SERPL-SCNC: 4.1 MMOL/L (ref 3.5–5.1)
PROT SERPL-MCNC: 7.2 G/DL (ref 6–8.4)
RBC # BLD AUTO: 4.71 M/UL (ref 4–5.4)
SODIUM SERPL-SCNC: 139 MMOL/L (ref 136–145)
T4 FREE SERPL-MCNC: 0.75 NG/DL (ref 0.71–1.51)
TRIGL SERPL-MCNC: 168 MG/DL (ref 30–150)
TSH SERPL DL<=0.005 MIU/L-ACNC: 4.08 UIU/ML (ref 0.4–4)
WBC # BLD AUTO: 5.73 K/UL (ref 3.9–12.7)

## 2024-07-22 PROCEDURE — 80061 LIPID PANEL: CPT | Performed by: PHYSICIAN ASSISTANT

## 2024-07-22 PROCEDURE — 1160F RVW MEDS BY RX/DR IN RCRD: CPT | Mod: CPTII,S$GLB,, | Performed by: PHYSICIAN ASSISTANT

## 2024-07-22 PROCEDURE — 80053 COMPREHEN METABOLIC PANEL: CPT | Performed by: PHYSICIAN ASSISTANT

## 2024-07-22 PROCEDURE — 85025 COMPLETE CBC W/AUTO DIFF WBC: CPT | Performed by: PHYSICIAN ASSISTANT

## 2024-07-22 PROCEDURE — 84443 ASSAY THYROID STIM HORMONE: CPT | Performed by: PHYSICIAN ASSISTANT

## 2024-07-22 PROCEDURE — 84439 ASSAY OF FREE THYROXINE: CPT | Performed by: PHYSICIAN ASSISTANT

## 2024-07-22 PROCEDURE — 36415 COLL VENOUS BLD VENIPUNCTURE: CPT | Performed by: PHYSICIAN ASSISTANT

## 2024-07-22 PROCEDURE — 3008F BODY MASS INDEX DOCD: CPT | Mod: CPTII,S$GLB,, | Performed by: PHYSICIAN ASSISTANT

## 2024-07-22 PROCEDURE — 3079F DIAST BP 80-89 MM HG: CPT | Mod: CPTII,S$GLB,, | Performed by: PHYSICIAN ASSISTANT

## 2024-07-22 PROCEDURE — 3074F SYST BP LT 130 MM HG: CPT | Mod: CPTII,S$GLB,, | Performed by: PHYSICIAN ASSISTANT

## 2024-07-22 PROCEDURE — 83036 HEMOGLOBIN GLYCOSYLATED A1C: CPT | Performed by: PHYSICIAN ASSISTANT

## 2024-07-22 PROCEDURE — 99999 PR PBB SHADOW E&M-EST. PATIENT-LVL V: CPT | Mod: PBBFAC,,, | Performed by: PHYSICIAN ASSISTANT

## 2024-07-22 PROCEDURE — 99396 PREV VISIT EST AGE 40-64: CPT | Mod: S$GLB,,, | Performed by: PHYSICIAN ASSISTANT

## 2024-07-22 PROCEDURE — 1159F MED LIST DOCD IN RCRD: CPT | Mod: CPTII,S$GLB,, | Performed by: PHYSICIAN ASSISTANT

## 2024-07-22 RX ORDER — OMEPRAZOLE 40 MG/1
40 CAPSULE, DELAYED RELEASE ORAL EVERY MORNING
Qty: 30 CAPSULE | Refills: 0 | Status: SHIPPED | OUTPATIENT
Start: 2024-07-22 | End: 2025-07-22

## 2024-07-22 NOTE — PROGRESS NOTES
Subjective     Patient ID: Kelly Thomason is a 43 y.o. female.    Chief Complaint: Annual Exam    HPI    Established pt of Christiano Hurst MD     Here for annual exam.    Previsit lab still pending     Concerned about weight gain ( started walking, pilates tameka for the past 2 weeks)  C/o gas, indigestion, epigastric discomfort and early satiety intermittent for the past month, she tried Zantac once, without much change    Migraines stable with current meds, followed by neurology     Past Medical History:   Diagnosis Date    Abnormal Pap smear of cervix     Depression     Head ache     Hives     Kidney stones     Urticaria      Social History     Tobacco Use    Smoking status: Never     Passive exposure: Never    Smokeless tobacco: Never   Substance Use Topics    Alcohol use: Not Currently     Comment: Rare    Drug use: No     Review of patient's allergies indicates:   Allergen Reactions    Percocet [oxycodone-acetaminophen] Other (See Comments)     Caused mobility problems           Review of Systems   Constitutional:  Negative for activity change and unexpected weight change.   HENT:  Negative for hearing loss, rhinorrhea and trouble swallowing.    Eyes:  Negative for discharge and visual disturbance.   Respiratory:  Negative for chest tightness and wheezing.    Cardiovascular:  Negative for chest pain and palpitations.   Gastrointestinal:  Negative for blood in stool, constipation, diarrhea and vomiting.   Endocrine: Negative for polydipsia and polyuria.   Genitourinary:  Negative for difficulty urinating, dysuria, hematuria and menstrual problem.   Musculoskeletal:  Negative for arthralgias, joint swelling and neck pain.   Neurological:  Negative for weakness and headaches.   Psychiatric/Behavioral:  Negative for confusion and dysphoric mood.      Answers submitted by the patient for this visit:  Review of Systems Questionnaire (Submitted on 7/15/2024)  activity change: No  unexpected weight change:  "No  neck pain: No  hearing loss: No  rhinorrhea: No  trouble swallowing: No  eye discharge: No  visual disturbance: No  chest tightness: No  wheezing: No  chest pain: No  palpitations: No  blood in stool: No  constipation: No  vomiting: No  diarrhea: No  polydipsia: No  polyuria: No  difficulty urinating: No  hematuria: No  menstrual problem: No  dysuria: No  joint swelling: No  arthralgias: No  headaches: No  weakness: No  confusion: No  dysphoric mood: No     Objective  /84 (BP Location: Right arm, Patient Position: Sitting, BP Method: Large (Manual))   Pulse 91   Ht 4' 11" (1.499 m)   Wt 84.9 kg (187 lb 2.7 oz)   SpO2 97%   BMI 37.80 kg/m²       Physical Exam  Vitals reviewed.   Constitutional:       General: She is not in acute distress.     Appearance: She is well-developed.   HENT:      Head: Normocephalic and atraumatic.      Right Ear: Tympanic membrane, ear canal and external ear normal.      Left Ear: Tympanic membrane, ear canal and external ear normal.   Cardiovascular:      Rate and Rhythm: Normal rate and regular rhythm.      Heart sounds: No murmur heard.  Pulmonary:      Effort: Pulmonary effort is normal.      Breath sounds: Normal breath sounds. No wheezing or rales.   Abdominal:      General: Bowel sounds are normal.      Palpations: Abdomen is soft.      Tenderness: There is no abdominal tenderness.   Musculoskeletal:      Right lower leg: No edema.      Left lower leg: No edema.   Skin:     General: Skin is warm and dry.      Findings: No rash.   Neurological:      Mental Status: She is alert.   Psychiatric:         Mood and Affect: Mood normal.            Assessment and Plan     1. Annual physical exam   reviewed and updated  Due for colonoscopy August 2025  Previsit lab still pending    2. Class 2 obesity due to excess calories without serious comorbidity with body mass index (BMI) of 37.0 to 37.9 in adult  BMI reviewed  Lifestyle mods discussed  Reviewed resources weight loss " clinic, health  or nutrition consult. (Pt declines at this time)    3. Dyspepsia        - trial of PPI  -     omeprazole (PRILOSEC) 40 MG capsule; Take 1 capsule (40 mg total) by mouth every morning.  Dispense: 30 capsule; Refill: 0  -     notify clinic if symptoms worsen or fail to improve    4. Colon cancer screening  -     Ambulatory referral/consult to Endo Procedure ; Future; Expected date: 08/22/2024        RTC in 1 yr for next annual or sooner if needed.     Savana Schwarz PA-C

## 2024-07-29 ENCOUNTER — TELEPHONE (OUTPATIENT)
Dept: ENDOSCOPY | Facility: HOSPITAL | Age: 44
End: 2024-07-29

## 2024-07-29 ENCOUNTER — CLINICAL SUPPORT (OUTPATIENT)
Dept: ENDOSCOPY | Facility: HOSPITAL | Age: 44
End: 2024-07-29
Payer: COMMERCIAL

## 2024-07-29 DIAGNOSIS — Z12.11 COLON CANCER SCREENING: ICD-10-CM

## 2024-07-29 RX ORDER — SODIUM, POTASSIUM,MAG SULFATES 17.5-3.13G
1 SOLUTION, RECONSTITUTED, ORAL ORAL DAILY
Qty: 1 KIT | Refills: 0 | Status: SHIPPED | OUTPATIENT
Start: 2024-07-29 | End: 2024-07-31

## 2024-07-29 NOTE — TELEPHONE ENCOUNTER
Spoke to pt to schedule procedure(s) Colonoscopy       Physician to perform procedure(s) Dr. NINO Lira  Date of Procedure (s) 9/26/24  Arrival Time 6:00 AM  Time of Procedure(s) 7:00 AM   Location of Procedure(s) [facility] [address] [facility]  Type of Rx Prep sent to patient: Suprep  Instructions provided to patient via [facility]    Patient was informed on the following information and verbalized understanding. Screening questionnaire reviewed with patient and complete. If procedure requires anesthesia, a responsible adult needs to be present to accompany the patient home, patient cannot drive after receiving anesthesia. Appointment details are tentative, especially check-in time. Patient will receive a prep-op call 7 days prior to confirm check-in time for procedure. If applicable the patient should contact their pharmacy to verify Rx for procedure prep is ready for pick-up. Patient was advised to call the scheduling department at [phone] if pharmacy states no Rx is available. Patient was advised to call the endoscopy scheduling department if any questions or concerns arise.      [facility] Scheduling Department

## 2024-08-13 DIAGNOSIS — R10.13 DYSPEPSIA: ICD-10-CM

## 2024-08-13 RX ORDER — OMEPRAZOLE 40 MG/1
40 CAPSULE, DELAYED RELEASE ORAL EVERY MORNING
Qty: 90 CAPSULE | Refills: 1 | Status: SHIPPED | OUTPATIENT
Start: 2024-08-13 | End: 2025-08-13

## 2024-09-26 ENCOUNTER — ANESTHESIA EVENT (OUTPATIENT)
Dept: ENDOSCOPY | Facility: HOSPITAL | Age: 44
End: 2024-09-26
Payer: COMMERCIAL

## 2024-09-26 ENCOUNTER — HOSPITAL ENCOUNTER (OUTPATIENT)
Facility: HOSPITAL | Age: 44
Discharge: HOME | End: 2024-09-26
Attending: COLON & RECTAL SURGERY | Admitting: COLON & RECTAL SURGERY
Payer: COMMERCIAL

## 2024-09-26 ENCOUNTER — ANESTHESIA (OUTPATIENT)
Dept: ENDOSCOPY | Facility: HOSPITAL | Age: 44
End: 2024-09-26
Payer: COMMERCIAL

## 2024-09-26 VITALS
WEIGHT: 187 LBS | HEIGHT: 59 IN | TEMPERATURE: 98 F | BODY MASS INDEX: 37.7 KG/M2 | RESPIRATION RATE: 18 BRPM | HEART RATE: 70 BPM | OXYGEN SATURATION: 100 % | SYSTOLIC BLOOD PRESSURE: 114 MMHG | DIASTOLIC BLOOD PRESSURE: 75 MMHG

## 2024-09-26 DIAGNOSIS — Z12.11 SCREENING FOR COLON CANCER: Primary | ICD-10-CM

## 2024-09-26 DIAGNOSIS — Z12.11 SCREENING FOR MALIGNANT NEOPLASM OF COLON: ICD-10-CM

## 2024-09-26 LAB
B-HCG UR QL: NEGATIVE
CTP QC/QA: YES

## 2024-09-26 PROCEDURE — 37000009 HC ANESTHESIA EA ADD 15 MINS: Performed by: COLON & RECTAL SURGERY

## 2024-09-26 PROCEDURE — 81025 URINE PREGNANCY TEST: CPT | Performed by: COLON & RECTAL SURGERY

## 2024-09-26 PROCEDURE — G0121 COLON CA SCRN NOT HI RSK IND: HCPCS | Performed by: COLON & RECTAL SURGERY

## 2024-09-26 PROCEDURE — G0121 COLON CA SCRN NOT HI RSK IND: HCPCS | Mod: ,,, | Performed by: COLON & RECTAL SURGERY

## 2024-09-26 PROCEDURE — 37000008 HC ANESTHESIA 1ST 15 MINUTES: Performed by: COLON & RECTAL SURGERY

## 2024-09-26 PROCEDURE — 63600175 PHARM REV CODE 636 W HCPCS: Performed by: NURSE ANESTHETIST, CERTIFIED REGISTERED

## 2024-09-26 PROCEDURE — 25000003 PHARM REV CODE 250: Performed by: NURSE ANESTHETIST, CERTIFIED REGISTERED

## 2024-09-26 RX ORDER — SODIUM CHLORIDE 9 MG/ML
INJECTION, SOLUTION INTRAVENOUS CONTINUOUS
Status: DISCONTINUED | OUTPATIENT
Start: 2024-09-26 | End: 2024-09-26 | Stop reason: HOSPADM

## 2024-09-26 RX ORDER — LIDOCAINE HYDROCHLORIDE 20 MG/ML
INJECTION INTRAVENOUS
Status: DISCONTINUED | OUTPATIENT
Start: 2024-09-26 | End: 2024-09-26

## 2024-09-26 RX ORDER — PROPOFOL 10 MG/ML
VIAL (ML) INTRAVENOUS CONTINUOUS PRN
Status: DISCONTINUED | OUTPATIENT
Start: 2024-09-26 | End: 2024-09-26

## 2024-09-26 RX ORDER — SODIUM CHLORIDE 0.9 % (FLUSH) 0.9 %
10 SYRINGE (ML) INJECTION
Status: DISCONTINUED | OUTPATIENT
Start: 2024-09-26 | End: 2024-09-26 | Stop reason: HOSPADM

## 2024-09-26 RX ORDER — PROPOFOL 10 MG/ML
VIAL (ML) INTRAVENOUS
Status: DISCONTINUED | OUTPATIENT
Start: 2024-09-26 | End: 2024-09-26

## 2024-09-26 RX ADMIN — SODIUM CHLORIDE: 9 INJECTION, SOLUTION INTRAVENOUS at 06:09

## 2024-09-26 RX ADMIN — LIDOCAINE HYDROCHLORIDE 30 MG: 20 INJECTION INTRAVENOUS at 07:09

## 2024-09-26 RX ADMIN — PROPOFOL 150 MCG/KG/MIN: 10 INJECTION, EMULSION INTRAVENOUS at 07:09

## 2024-09-26 RX ADMIN — PROPOFOL 70 MG: 10 INJECTION, EMULSION INTRAVENOUS at 07:09

## 2024-09-26 NOTE — ANESTHESIA PREPROCEDURE EVALUATION
"                                                                                                             09/26/2024  Kelly Thomason is a 44 y.o., female.    Pre-operative evaluation for Procedure(s) (LRB):  COLONOSCOPY (N/A)    Kelly Thomason is a 44 y.o. female with a PMH of depression, migraines, and neck pain presenting for the above procedure. Pt has hx of difficult intubation and severe sore throat after anesthesia.           Patient Active Problem List   Diagnosis    Depression    Migraine without aura and without status migrainosus, not intractable    Neck pain    Poor posture    Decreased ROM of intervertebral discs of cervical spine    History of colposcopy with cervical biopsy    Nexplanon in place       Review of patient's allergies indicates:   Allergen Reactions    Percocet [oxycodone-acetaminophen] Other (See Comments)     Caused mobility problems       Past Surgical History:   Procedure Laterality Date    KIDNEY STONE SURGERY      LAPAROSCOPIC CHOLECYSTECTOMY N/A 01/25/2023    Procedure: CHOLECYSTECTOMY, LAPAROSCOPIC;  Surgeon: Eliecer Gong MD;  Location: [facility] OR [address] [place] [address];  Service: General;  Laterality: N/A;  consent in am    WISDOM TOOTH EXTRACTION           Vital Signs:         CBC: No results for input(s): "WBC", "RBC", "HGB", "HCT", "PLT", "MCV", "MCH", "MCHC" in the last 72 hours.    CMP: No results for input(s): "NA", "K", "CL", "CO2", "BUN", "CREATININE", "GLU", "MG", "PHOS", "CALCIUM", "ALBUMIN", "PROT", "ALKPHOS", "ALT", "AST", "BILITOT" in the last 72 hours.    INR  No results for input(s): "PT", "INR", "PROTIME", "APTT" in the last 72 hours.                Pre-op Assessment    I have reviewed the Patient Summary Reports.     I have reviewed the Nursing Notes. I have reviewed the NPO Status.   I have reviewed the Medications.     Review of Systems  Anesthesia Hx:   History of prior surgery of interest to airway management or planning:          Denies Family Hx of Anesthesia " complications.   Personal Hx of Anesthesia complications     Severe Sore Throat after Anesthesia                Neurological:      Headaches                                 Psych:  Psychiatric History                  Physical Exam  General: Well nourished    Airway:  Mallampati: III / II  Mouth Opening: Normal  TM Distance: Normal  Tongue: Normal    Dental:  Intact        Anesthesia Plan  Type of Anesthesia, risks & benefits discussed:    Anesthesia Type: Gen Natural Airway, MAC, Gen Supraglottic Airway  Intra-op Monitoring Plan: Standard ASA Monitors  Post Op Pain Control Plan: multimodal analgesia  Induction:  IV  Informed Consent: Informed consent signed with the Patient and all parties understand the risks and agree with anesthesia plan.  All questions answered.   ASA Score: 2  Day of Surgery Review of History & Physical: H&P Update referred to the surgeon/provider.    Ready For Surgery From Anesthesia Perspective.     .

## 2024-09-26 NOTE — PLAN OF CARE
Pt is AAOX4,VSS. Discharge instructions reviewed and pt verbalizes understanding. All questions answered. IV removed. Pt ready for discharge.

## 2024-09-26 NOTE — TRANSFER OF CARE
"Anesthesia Transfer of Care Note    Patient: Kelly Thomason    Procedure(s) Performed: Procedure(s) (LRB):  COLONOSCOPY (N/A)    Patient location: PACU    Anesthesia Type: general    Transport from OR: Transported from OR on room air with adequate spontaneous ventilation    Post pain: adequate analgesia    Post assessment: no apparent anesthetic complications and tolerated procedure well    Post vital signs: stable    Level of consciousness: awake, alert and oriented    Nausea/Vomiting: no nausea/vomiting    Complications: none    Transfer of care protocol was followed      Last vitals: Visit Vitals  BP (!) 154/99   Pulse 70   Temp 36.7 °C (98.1 °F)   Resp 16   Ht 4' 11" (1.499 m)   Wt 84.8 kg (187 lb)   SpO2 99%   Breastfeeding No   BMI 37.77 kg/m²     "

## 2024-09-26 NOTE — PROVATION PATIENT INSTRUCTIONS
Discharge Summary/Instructions after an Endoscopic Procedure  Patient Name: Kelly Thomason  Patient MRN: [id number]  Patient YOB: 1980 Thursday, September 26, 2024  Hollie Lira MD  Dear patient,  As a result of recent federal legislation (The Federal Cures Act), you may   receive lab or pathology results from your procedure in your [facility]   account before your physician is able to contact you. Your physician or   their representative will relay the results to you with their   recommendations at their soonest availability.  Thank you,  RESTRICTIONS:  During your procedure today, you received medications for sedation.  These   medications may affect your judgment, balance and coordination.  Therefore,   for 24 hours, you have the following restrictions:   - DO NOT drive a car, operate machinery, make legal/financial decisions,   sign important papers or drink alcohol.    ACTIVITY:  Today: no heavy lifting, straining or running due to procedural   sedation/anesthesia.  The following day: return to full activity including work.  DIET:  Eat and drink normally unless instructed otherwise.     TREATMENT FOR COMMON SIDE EFFECTS:  - Mild abdominal pain, nausea, belching, bloating or excessive gas:  rest,   eat lightly and use a heating pad.  - Sore Throat: treat with throat lozenges and/or gargle with warm salt   water.  - Because air was used during the procedure, expelling large amounts of air   from your rectum or belching is normal.  - If a bowel prep was taken, you may not have a bowel movement for 1-3 days.    This is normal.  SYMPTOMS TO WATCH FOR AND REPORT TO YOUR PHYSICIAN:  1. Abdominal pain or bloating, other than gas cramps.  2. Chest pain.  3. Back pain.  4. Signs of infection such as: chills or fever occurring within 24 hours   after the procedure.  5. Rectal bleeding, which would show as bright red, maroon, or black stools.   (A tablespoon of blood from the rectum is not serious,  especially if   hemorrhoids are present.)  6. Vomiting.  7. Weakness or dizziness.  GO DIRECTLY TO THE NEAREST EMERGENCY ROOM IF YOU HAVE ANY OF THE FOLLOWING:      Difficulty breathing              Chills and/or fever over 101 F   Persistent vomiting and/or vomiting blood   Severe abdominal pain   Severe chest pain   Black, tarry stools   Bleeding- more than one tablespoon   Any other symptom or condition that you feel may need urgent attention  Your doctor recommends these additional instructions:  If any biopsies were taken, your doctors clinic will contact you in 1 to 2   weeks with any results.  - Discharge patient to home.   - Resume previous diet.   - Continue present medications.   - Repeat colonoscopy in 10 years for screening purposes.   - Written discharge instructions were provided to the patient.   - The signs and symptoms of potential delayed complications were discussed   with the patient.   - Patient has a contact number available for emergencies.   - Return to normal activities tomorrow.  For questions, problems or results please call your physician - Hollie Lira MD at Work:  [phone].  [facility], EMERGENCY ROOM PHONE NUMBER: [phone]  IF A COMPLICATION OR EMERGENCY SITUATION ARISES AND YOU ARE UNABLE TO REACH   YOUR PHYSICIAN - GO DIRECTLY TO THE EMERGENCY ROOM.  Hollie Lira MD  9/26/2024 7:37:03 AM  This report has been verified and signed electronically.  Dear patient,  As a result of recent federal legislation (The Federal Cures Act), you may   receive lab or pathology results from your procedure in your [facility]   account before your physician is able to contact you. Your physician or   their representative will relay the results to you with their   recommendations at their soonest availability.  Thank you,  PROVATION

## 2024-09-26 NOTE — H&P
COLONOSCOPY HISTORY AND PHYSICAL EXAM    Procedure : Colonoscopy    INDICATIONS: asymptomatic screening exam    Family Hx of CRC: no  Last Colonoscopy:  none  Findings: n/a       Past Medical History:   Diagnosis Date    Abnormal Pap smear of cervix     Depression     Head ache     Hives     Kidney stones     Urticaria      Sedation Problems: NO  Family History   Problem Relation Name Age of Onset    Hyperlipidemia Mother      Hyperlipidemia Father      Breast cancer Paternal Grandmother Anushka         Dx > 50 years    Colon cancer Paternal Grandmother Anushka     Ovarian cancer Neg Hx      Cancer Neg Hx       Fam Hx of Sedation Problems: NO  Social History     Socioeconomic History    Marital status: Single   Tobacco Use    Smoking status: Never     Passive exposure: Never    Smokeless tobacco: Never   Substance and Sexual Activity    Alcohol use: Not Currently     Comment: Rare    Drug use: No    Sexual activity: Not Currently     Partners: Male     Birth control/protection: Implant   Social History Narrative    Works as     Single mom    Daughter: Akosua     Social Determinants of Health     Financial Resource Strain: Low Risk  (4/21/2024)    Overall Financial Resource Strain (CARDIA)     Difficulty of Paying Living Expenses: Not hard at all   Food Insecurity: No Food Insecurity (4/21/2024)    Hunger Vital Sign     Worried About Running Out of Food in the Last Year: Never true     Ran Out of Food in the Last Year: Never true   Transportation Needs: No Transportation Needs (4/21/2024)    PRAPARE - Transportation     Lack of Transportation (Medical): No     Lack of Transportation (Non-Medical): No   Physical Activity: Insufficiently Active (4/21/2024)    Exercise Vital Sign     Days of Exercise per Week: 3 days     Minutes of Exercise per Session: 30 min   Stress: No Stress Concern Present (4/21/2024)    Citizen of [place] [employer] - Occupational Stress Questionnaire     Feeling of Stress :  "Only a little   Housing Stability: Low Risk  (6/27/2023)    Housing Stability Vital Sign     Unable to Pay for Housing in the Last Year: No     Number of Places Lived in the Last Year: 1     Unstable Housing in the Last Year: No       Review of Systems - Negative except   Respiratory ROS: no dyspnea  Cardiovascular ROS: no exertional chest pain  Gastrointestinal ROS: NO abdominal discomfort,  NO rectal bleeding  Musculoskeletal ROS: no muscular pain  Neurological ROS: no recent stroke    Physical Exam:  BP (!) 154/99   Pulse 70   Temp 98.1 °F (36.7 °C)   Resp 16   Ht 4' 11" (1.499 m)   Wt 84.8 kg (187 lb)   SpO2 99%   Breastfeeding No   BMI 37.77 kg/m²   General: no distress  Head: normocephalic  Mallampati Score   Neck: supple, symmetrical, trachea midline  Lungs:  clear to auscultation bilaterally and normal respiratory effort  Heart: regular rate and rhythm  Abdomen: soft, non-tender non-distented; bowel sounds normal; no masses,  no organomegaly  Extremities: no cyanosis or edema, or clubbing    ASA:  III    PLAN  COLONOSCOPY.    SedationPlan :MAC    The details of the procedure, the possible need for biopsy or polypectomy and the potential risks including bleeding, perforation, missed polyps were discussed in detail.      "

## 2024-09-30 NOTE — ANESTHESIA POSTPROCEDURE EVALUATION
Anesthesia Post Evaluation    Patient: Kelly Thomason    Procedure(s) Performed: Procedure(s) (LRB):  COLONOSCOPY (N/A)    Final Anesthesia Type: MAC      Patient location during evaluation: PACU  Patient participation: Yes- Able to Participate  Level of consciousness: awake and alert  Post-procedure vital signs: reviewed and stable  Pain management: adequate  Airway patency: patent    PONV status at discharge: No PONV  Anesthetic complications: no      Cardiovascular status: blood pressure returned to baseline  Respiratory status: unassisted, spontaneous ventilation and room air  Hydration status: euvolemic  Follow-up not needed.              Vitals Value Taken Time   /75 09/26/24 0830   Temp 36.5 °C (97.7 °F) 09/26/24 0830   Pulse 70 09/26/24 0830   Resp 18 09/26/24 0830   SpO2 100 % 09/26/24 0830         No case tracking events are documented in the log.      Pain/Luana Score: No data recorded

## 2024-10-15 DIAGNOSIS — F41.9 ANXIETY: ICD-10-CM

## 2024-10-15 RX ORDER — SERTRALINE HYDROCHLORIDE 100 MG/1
TABLET, FILM COATED ORAL
Qty: 90 TABLET | Refills: 1 | Status: SHIPPED | OUTPATIENT
Start: 2024-10-15

## 2025-03-01 ENCOUNTER — HOSPITAL ENCOUNTER (OUTPATIENT)
Dept: RADIOLOGY | Facility: HOSPITAL | Age: 45
Discharge: HOME | End: 2025-03-01
Attending: INTERNAL MEDICINE
Payer: COMMERCIAL

## 2025-03-01 DIAGNOSIS — Z12.31 ENCOUNTER FOR SCREENING MAMMOGRAM FOR BREAST CANCER: ICD-10-CM

## 2025-03-01 PROCEDURE — 77067 SCR MAMMO BI INCL CAD: CPT | Mod: 26,,, | Performed by: RADIOLOGY

## 2025-03-01 PROCEDURE — 77067 SCR MAMMO BI INCL CAD: CPT | Mod: TC

## 2025-03-01 PROCEDURE — 77063 BREAST TOMOSYNTHESIS BI: CPT | Mod: 26,,, | Performed by: RADIOLOGY

## 2025-03-05 ENCOUNTER — PATIENT MESSAGE (OUTPATIENT)
Dept: INTERNAL MEDICINE | Facility: CLINIC | Age: 45
End: 2025-03-05
Payer: COMMERCIAL

## 2025-03-24 DIAGNOSIS — G43.009 MIGRAINE WITHOUT AURA AND WITHOUT STATUS MIGRAINOSUS, NOT INTRACTABLE: ICD-10-CM

## 2025-03-24 RX ORDER — GALCANEZUMAB 120 MG/ML
120 INJECTION, SOLUTION SUBCUTANEOUS
Qty: 1 ML | Refills: 11 | Status: CANCELLED | OUTPATIENT
Start: 2025-03-24

## 2025-03-25 DIAGNOSIS — G43.009 MIGRAINE WITHOUT AURA AND WITHOUT STATUS MIGRAINOSUS, NOT INTRACTABLE: ICD-10-CM

## 2025-03-25 RX ORDER — GALCANEZUMAB 120 MG/ML
120 INJECTION, SOLUTION SUBCUTANEOUS
Qty: 1 ML | Refills: 1 | Status: SHIPPED | OUTPATIENT
Start: 2025-03-25

## 2025-04-02 ENCOUNTER — OFFICE VISIT (OUTPATIENT)
Dept: OBSTETRICS AND GYNECOLOGY | Facility: CLINIC | Age: 45
End: 2025-04-02
Payer: COMMERCIAL

## 2025-04-02 VITALS
WEIGHT: 180.31 LBS | SYSTOLIC BLOOD PRESSURE: 112 MMHG | BODY MASS INDEX: 36.42 KG/M2 | DIASTOLIC BLOOD PRESSURE: 84 MMHG

## 2025-04-02 DIAGNOSIS — Z12.4 PAP SMEAR FOR CERVICAL CANCER SCREENING: ICD-10-CM

## 2025-04-02 DIAGNOSIS — Z97.5 NEXPLANON IN PLACE: ICD-10-CM

## 2025-04-02 DIAGNOSIS — Z01.419 WELL WOMAN EXAM WITH ROUTINE GYNECOLOGICAL EXAM: Primary | ICD-10-CM

## 2025-04-02 PROCEDURE — 3008F BODY MASS INDEX DOCD: CPT | Mod: CPTII,S$GLB,, | Performed by: NURSE PRACTITIONER

## 2025-04-02 PROCEDURE — 3074F SYST BP LT 130 MM HG: CPT | Mod: CPTII,S$GLB,, | Performed by: NURSE PRACTITIONER

## 2025-04-02 PROCEDURE — 99999 PR PBB SHADOW E&M-EST. PATIENT-LVL III: CPT | Mod: PBBFAC,,, | Performed by: NURSE PRACTITIONER

## 2025-04-02 PROCEDURE — 1159F MED LIST DOCD IN RCRD: CPT | Mod: CPTII,S$GLB,, | Performed by: NURSE PRACTITIONER

## 2025-04-02 PROCEDURE — 99396 PREV VISIT EST AGE 40-64: CPT | Mod: S$GLB,,, | Performed by: NURSE PRACTITIONER

## 2025-04-02 PROCEDURE — 3079F DIAST BP 80-89 MM HG: CPT | Mod: CPTII,S$GLB,, | Performed by: NURSE PRACTITIONER

## 2025-04-02 NOTE — PROGRESS NOTES
CC: Annual  HPI: Pt is a 44 y.o.  female who presents for routine annual exam. She uses nexplanon for contraception. She does not want STD screening. Last seen in  for a colposcopy, no follow up since then.     FH:   Breast cancer: none  Colon cancer: none  Ovarian cancer: none  Uterine cancer: none    HPV vaccine: 1/3  Last pap smear:  nilm hpv pos 16> colpo showed LORENA I  History of abnormal pap smears: yes    Colonoscopy: current, repeat   DEXA:  Mammogram: current  STD history: hpv  Birth control: nexplanon   OB history:   Tobacco use: no    ROS:  GENERAL: Feeling well overall. Denies fever or chills.   SKIN: Denies rash or lesions.   HEAD: Denies head injury or headache.   NODES: Denies enlarged lymph nodes.   CHEST: Denies chest pain or shortness of breath.   CARDIOVASCULAR: Denies palpitations or left sided chest pain.   ABDOMEN: No abdominal pain, constipation, diarrhea, nausea, vomiting or rectal bleeding.   URINARY: No dysuria, hematuria, or burning on urination.  REPRODUCTIVE: See HPI.   BREASTS: Denies pain, lumps, or nipple discharge.   HEMATOLOGIC: No easy bruisability or excessive bleeding.   MUSCULOSKELETAL: Denies joint pain or swelling.   NEUROLOGIC: Denies syncope or weakness.   PSYCHIATRIC: Denies depression, anxiety or mood swings.    PE:   APPEARANCE: Well nourished, well developed, White female in no acute distress.  NODES: no cervical, supraclavicular, or inguinal lymphadenopathy  BREASTS: Symmetrical, no skin changes or visible lesions. No palpable masses, nipple discharge or adenopathy bilaterally.  ABDOMEN: Soft. No tenderness or masses. No distention. No hernias palpated. No CVA tenderness.  VULVA: No lesions. Normal external female genitalia.  URETHRAL MEATUS: Normal size and location, no lesions, no prolapse.  URETHRA: No masses, tenderness, or prolapse.  VAGINA: Moist. No lesions or lacerations noted. No abnormal discharge present. No odor present.   CERVIX: No  lesions or discharge. No cervical motion tenderness.   UTERUS: Normal size, regular shape, mobile, non-tender.  ADNEXA: No tenderness. No fullness or masses palpated in the adnexal regions.   ANUS PERINEUM: Normal.      Diagnosis:  1. Well woman exam with routine gynecological exam    2. Pap smear for cervical cancer screening    3. Nexplanon in place        Plan:     Orders Placed This Encounter    Liquid-Based Pap Smear, Screening     Mammogram current  Pap updated  Colon ca screening current  Declines std screening    Patient was counseled today on the new ACS guidelines for cervical cytology screening as well as the current recommendations for breast cancer screening. She was counseled to follow up with her PCP for other routine health maintenance. Counseling session lasted approximately 10 minutes, and all her questions were answered.  For women over the age of 65, you can stop having cervical cancer screenings if you have never had abnormal cervical cells or cervical cancer, and youve had three negative Pap tests in a row. (You also can stop screening if youve had two negative Pap and HPV tests in a row in the past 10 years, with at least one test in the past 5 years.),    Follow-up with me in 1 year for routine exam; pap in 3 years.     I spent a total of 20 minutes on the day of the visit.This includes face to face time and non-face to face time preparing to see the patient (eg, review of tests), obtaining and/or reviewing separately obtained history, documenting clinical information in the electronic or other health record, independently interpreting results and communicating results to the patient/family/caregiver, or care coordinator.    As of April 1, 2021, the Cures Act has been passed nationally. This new law requires that all doctors progress notes, lab results, pathology reports and radiology reports be released IMMEDIATELY to the patient in the patient portal. That means that the results are  released to you at the EXACT same time they are released to me. Therefore, with all of the patients that I have I am not able to reply to each patient exactly when the results come in. So there will be a delay from when you see the results to when I see them and have time to come up with a response to send you. Also I only see these results when I am on the computer at work. So if the results come in over the weekend or after 5 pm of a work day, I will not see them until the next business day. As you can tell, this is a challenge as a provider to give every patient the quick response they hope for and deserve. So please be patient!     Thanks for your understanding and patience.

## 2025-04-10 ENCOUNTER — OFFICE VISIT (OUTPATIENT)
Dept: INTERNAL MEDICINE | Facility: CLINIC | Age: 45
End: 2025-04-10
Payer: COMMERCIAL

## 2025-04-10 ENCOUNTER — HOSPITAL ENCOUNTER (OUTPATIENT)
Dept: RADIOLOGY | Facility: HOSPITAL | Age: 45
Discharge: HOME | End: 2025-04-10
Attending: PHYSICIAN ASSISTANT
Payer: COMMERCIAL

## 2025-04-10 ENCOUNTER — TELEPHONE (OUTPATIENT)
Dept: INTERNAL MEDICINE | Facility: CLINIC | Age: 45
End: 2025-04-10
Payer: COMMERCIAL

## 2025-04-10 ENCOUNTER — RESULTS FOLLOW-UP (OUTPATIENT)
Dept: OBSTETRICS AND GYNECOLOGY | Facility: CLINIC | Age: 45
End: 2025-04-10

## 2025-04-10 ENCOUNTER — RESULTS FOLLOW-UP (OUTPATIENT)
Dept: INTERNAL MEDICINE | Facility: CLINIC | Age: 45
End: 2025-04-10

## 2025-04-10 VITALS
BODY MASS INDEX: 36.75 KG/M2 | WEIGHT: 182.31 LBS | HEIGHT: 59 IN | SYSTOLIC BLOOD PRESSURE: 134 MMHG | OXYGEN SATURATION: 99 % | HEART RATE: 86 BPM | DIASTOLIC BLOOD PRESSURE: 88 MMHG

## 2025-04-10 DIAGNOSIS — R06.02 SOB (SHORTNESS OF BREATH): ICD-10-CM

## 2025-04-10 DIAGNOSIS — R07.89 ATYPICAL CHEST PAIN: ICD-10-CM

## 2025-04-10 DIAGNOSIS — R05.9 COUGH, UNSPECIFIED TYPE: ICD-10-CM

## 2025-04-10 DIAGNOSIS — K30 INDIGESTION: ICD-10-CM

## 2025-04-10 DIAGNOSIS — R06.02 SOB (SHORTNESS OF BREATH): Primary | ICD-10-CM

## 2025-04-10 LAB
OHS QRS DURATION: 86 MS
OHS QTC CALCULATION: 467 MS

## 2025-04-10 PROCEDURE — 1160F RVW MEDS BY RX/DR IN RCRD: CPT | Mod: CPTII,S$GLB,, | Performed by: PHYSICIAN ASSISTANT

## 2025-04-10 PROCEDURE — 93010 ELECTROCARDIOGRAM REPORT: CPT | Mod: S$GLB,,, | Performed by: INTERNAL MEDICINE

## 2025-04-10 PROCEDURE — 3079F DIAST BP 80-89 MM HG: CPT | Mod: CPTII,S$GLB,, | Performed by: PHYSICIAN ASSISTANT

## 2025-04-10 PROCEDURE — 93005 ELECTROCARDIOGRAM TRACING: CPT | Mod: S$GLB,,, | Performed by: PHYSICIAN ASSISTANT

## 2025-04-10 PROCEDURE — 99999 PR PBB SHADOW E&M-EST. PATIENT-LVL IV: CPT | Mod: PBBFAC,,, | Performed by: PHYSICIAN ASSISTANT

## 2025-04-10 PROCEDURE — 99214 OFFICE O/P EST MOD 30 MIN: CPT | Mod: S$GLB,,, | Performed by: PHYSICIAN ASSISTANT

## 2025-04-10 PROCEDURE — 3008F BODY MASS INDEX DOCD: CPT | Mod: CPTII,S$GLB,, | Performed by: PHYSICIAN ASSISTANT

## 2025-04-10 PROCEDURE — 3075F SYST BP GE 130 - 139MM HG: CPT | Mod: CPTII,S$GLB,, | Performed by: PHYSICIAN ASSISTANT

## 2025-04-10 PROCEDURE — 71046 X-RAY EXAM CHEST 2 VIEWS: CPT | Mod: 26,,, | Performed by: RADIOLOGY

## 2025-04-10 PROCEDURE — 71046 X-RAY EXAM CHEST 2 VIEWS: CPT | Mod: TC

## 2025-04-10 PROCEDURE — 1159F MED LIST DOCD IN RCRD: CPT | Mod: CPTII,S$GLB,, | Performed by: PHYSICIAN ASSISTANT

## 2025-04-10 NOTE — TELEPHONE ENCOUNTER
Lorraine Banks Sonya H., PA-C  Cc: JONATAN Sawant Staff  Good afternoon!    In regard to your patient, the STRESS ECHO (CUPID ONLY) that was ordered and scheduled for tomorrow is pending clinical review with [facility]. Unfortunately, when this happens, the insurance company has up to 14 calendar days to render a decision. If you find this to be medically urgent, please feel free to call [facility] on behalf of [facility] at [phone] for a peer to peer discussion. If not, I will definitely update you as the status changes. Please notify me once the office visit note has been signed, so that it can be uploaded to the insurance company.    Thanking you in advance,    Lorraine Martel-Service

## 2025-04-10 NOTE — TELEPHONE ENCOUNTER
If the pap smear is normal too, we will repeat both in 3 years! You still come yearly for the annual exam. The pap is still in process.

## 2025-04-10 NOTE — PROGRESS NOTES
"    Subjective     Patient ID: Kelly Thomason is a 44 y.o. female.    Chief Complaint: Shortness of Breath    HPI    Here with concerns of sob.     Started about 3 to 4 week ago  Happens "every now and then", describes as need to take a deep breath which provokes coughing, feels phlegm at throat.  No associated with exertion. She gets pains varying places of upper chest.  Also with some indigestion, belching, tums help.   Back is sore from coughing.     Walked a 5K 2.5 weeks ago without difficulty.    Anxious about symptoms    Answers submitted by the patient for this visit:  Shortness of Breath Questionnaire (Submitted on 4/9/2025)  Chief Complaint: Shortness of breath  Chronicity: new  Onset: 1 to 4 weeks ago  Frequency: daily  Progression since onset: unchanged  Episode duration: 1 Minutes  abdominal pain: No  chest pain: Yes  hemoptysis: No  sputum production: Yes  PND: No  ear pain: No  syncope: No  fever: No  headaches: Yes  claudication: No  leg pain: No  leg swelling: No  neck pain: No  rash: No  rhinorrhea: No  orthopnea: No  sore throat: No  coryza: No  swollen glands: No  vomiting: No  wheezing: No  Aggravating factors: odors, fumes  Improvement on treatment: no relief  Risk factors for DVT/PE: no known risk factors  Treatments tried: body position changes, rest  asthma: No  allergies: No  COPD: No  pneumonia: No  aspirin allergies: No  CAD: No  DVT: No  heart failure: No  bronchiolitis: No  chronic lung disease: No  Past Medical History:   Diagnosis Date    Abnormal Pap smear of cervix     Depression     Head ache     Hives     Kidney stones     Urticaria      Social History[1]    Review of patient's allergies indicates:   Allergen Reactions    Percocet [oxycodone-acetaminophen] Other (See Comments)     Caused mobility problems       Review of Systems   Constitutional:  Negative for fever.   HENT:  Negative for ear pain, rhinorrhea, sore throat and trouble swallowing.    Respiratory:  Positive for cough " "and shortness of breath. Negative for wheezing.    Cardiovascular:  Positive for chest pain. Negative for leg swelling.   Gastrointestinal:  Negative for abdominal pain and vomiting.   Musculoskeletal:  Negative for leg pain and neck pain.   Integumentary:  Negative for rash.   Neurological:  Positive for headaches.          Objective  /88 (BP Location: Left arm, Patient Position: Sitting)   Pulse 86   Ht 4' 11" (1.499 m)   Wt 82.7 kg (182 lb 5.1 oz)   SpO2 99%   BMI 36.82 kg/m²       Physical Exam  Vitals reviewed.   Constitutional:       General: She is not in acute distress.     Appearance: She is well-developed.   HENT:      Head: Normocephalic and atraumatic.      Right Ear: Tympanic membrane, ear canal and external ear normal.      Left Ear: Tympanic membrane, ear canal and external ear normal.   Cardiovascular:      Rate and Rhythm: Normal rate and regular rhythm.      Heart sounds: No murmur heard.  Pulmonary:      Effort: Pulmonary effort is normal.      Breath sounds: Normal breath sounds. No wheezing or rales.   Abdominal:      General: Bowel sounds are normal.      Palpations: Abdomen is soft.      Tenderness: There is no abdominal tenderness.   Musculoskeletal:      Right lower leg: No edema.      Left lower leg: No edema.   Skin:     General: Skin is warm and dry.      Findings: No rash.   Neurological:      Mental Status: She is alert and oriented to person, place, and time.   Psychiatric:         Mood and Affect: Mood normal.            Assessment and Plan     1. SOB (shortness of breath)  -     IN OFFICE EKG 12-LEAD (to Muse)  -     X-Ray Chest PA And Lateral; Future; Expected date: 04/10/2025  -     CBC Auto Differential; Future; Expected date: 04/10/2025  -     Comprehensive Metabolic Panel; Future; Expected date: 04/10/2025  -     Stress Echo Which stress agent will be used? Treadmill Exercise; Color Flow Doppler? No; Future    2. Atypical chest pain  -     X-Ray Chest PA And Lateral; " Future; Expected date: 04/10/2025  -     Stress Echo Which stress agent will be used? Treadmill Exercise; Color Flow Doppler? No; Future    3. Cough, unspecified type  -     X-Ray Chest PA And Lateral; Future; Expected date: 04/10/2025    4. Indigestion      EKG in clinic NSR  Atypical symptoms, further eval with studies as above  Suspect GERD component, advised to restart PPI for 2 weeks  ED/RTC precautions discussed      Saavna Schwarz PA-C           [1]   Social History  Tobacco Use    Smoking status: Never     Passive exposure: Never    Smokeless tobacco: Never   Substance Use Topics    Alcohol use: Not Currently     Comment: Rare    Drug use: No

## 2025-04-15 ENCOUNTER — PATIENT MESSAGE (OUTPATIENT)
Dept: INTERNAL MEDICINE | Facility: CLINIC | Age: 45
End: 2025-04-15
Payer: COMMERCIAL

## 2025-04-15 DIAGNOSIS — R06.02 SOB (SHORTNESS OF BREATH): Primary | ICD-10-CM

## 2025-04-15 DIAGNOSIS — R07.89 ATYPICAL CHEST PAIN: ICD-10-CM

## 2025-04-15 NOTE — TELEPHONE ENCOUNTER
I reordered for the treadmill stress test only. Please help pt schedule.   Schedule at least 1-2 days from now so it can process via insurance.     Orders Placed This Encounter   Procedures    Exercise Stress - EKG

## 2025-04-15 NOTE — TELEPHONE ENCOUNTER
Scheduled stress test for 4/17. No appts on 4/18 and pt wanted to do it ASAP within 1-2 days for processing of insurance.

## 2025-04-15 NOTE — TELEPHONE ENCOUNTER
PT's insurance denied the stress test due to it being more than a treadmills test. Pt is wondering if you could re-order the stress test under a different code and reference SOB and CP. Pt states her symptoms are worsening.

## 2025-04-17 ENCOUNTER — RESULTS FOLLOW-UP (OUTPATIENT)
Dept: INTERNAL MEDICINE | Facility: CLINIC | Age: 45
End: 2025-04-17

## 2025-04-17 ENCOUNTER — HOSPITAL ENCOUNTER (OUTPATIENT)
Dept: CARDIOLOGY | Facility: HOSPITAL | Age: 45
Discharge: HOME | End: 2025-04-17
Attending: PHYSICIAN ASSISTANT
Payer: COMMERCIAL

## 2025-04-17 VITALS — BODY MASS INDEX: 33.99 KG/M2 | WEIGHT: 180 LBS | HEIGHT: 61 IN

## 2025-04-17 DIAGNOSIS — R06.02 SOB (SHORTNESS OF BREATH): ICD-10-CM

## 2025-04-17 DIAGNOSIS — R07.89 ATYPICAL CHEST PAIN: ICD-10-CM

## 2025-04-17 LAB
CV STRESS BASE HR: 85 BPM
DIASTOLIC BLOOD PRESSURE: 86 MMHG
OHS CV CPX 1 MINUTE RECOVERY HEART RATE: 139 BPM
OHS CV CPX 85 PERCENT MAX PREDICTED HEART RATE MALE: 149
OHS CV CPX ESTIMATED METS: 10
OHS CV CPX MAX PREDICTED HEART RATE: 175
OHS CV CPX PATIENT IS FEMALE: 1
OHS CV CPX PATIENT IS MALE: 0
OHS CV CPX PEAK DIASTOLIC BLOOD PRESSURE: 94 MMHG
OHS CV CPX PEAK HEAR RATE: 166 BPM
OHS CV CPX PEAK RATE PRESSURE PRODUCT: NORMAL
OHS CV CPX PEAK SYSTOLIC BLOOD PRESSURE: 203 MMHG
OHS CV CPX PERCENT MAX PREDICTED HEART RATE ACHIEVED: 100
OHS CV CPX RATE PRESSURE PRODUCT PRESENTING: NORMAL
STRESS ECHO POST EXERCISE DUR MIN: 6 MINUTES
STRESS ECHO POST EXERCISE DUR SEC: 29 SECONDS
SYSTOLIC BLOOD PRESSURE: 140 MMHG

## 2025-04-17 PROCEDURE — 93018 CV STRESS TEST I&R ONLY: CPT | Mod: ,,, | Performed by: INTERNAL MEDICINE

## 2025-04-17 PROCEDURE — 93017 CV STRESS TEST TRACING ONLY: CPT

## 2025-04-17 PROCEDURE — 93016 CV STRESS TEST SUPVJ ONLY: CPT | Mod: ,,, | Performed by: INTERNAL MEDICINE

## 2025-05-19 DIAGNOSIS — F41.9 ANXIETY: ICD-10-CM

## 2025-05-21 RX ORDER — SERTRALINE HYDROCHLORIDE 100 MG/1
100 TABLET, FILM COATED ORAL
Qty: 90 TABLET | Refills: 0 | Status: SHIPPED | OUTPATIENT
Start: 2025-05-21

## 2025-05-27 DIAGNOSIS — G43.009 MIGRAINE WITHOUT AURA AND WITHOUT STATUS MIGRAINOSUS, NOT INTRACTABLE: ICD-10-CM

## 2025-05-27 RX ORDER — GALCANEZUMAB 120 MG/ML
120 INJECTION, SOLUTION SUBCUTANEOUS
Qty: 1 ML | Refills: 0 | Status: SHIPPED | OUTPATIENT
Start: 2025-05-27

## 2025-06-02 ENCOUNTER — OFFICE VISIT (OUTPATIENT)
Facility: CLINIC | Age: 45
End: 2025-06-02
Payer: COMMERCIAL

## 2025-06-02 VITALS
WEIGHT: 183.44 LBS | DIASTOLIC BLOOD PRESSURE: 85 MMHG | HEART RATE: 76 BPM | SYSTOLIC BLOOD PRESSURE: 130 MMHG | BODY MASS INDEX: 34.66 KG/M2

## 2025-06-02 DIAGNOSIS — F41.9 ANXIETY: ICD-10-CM

## 2025-06-02 DIAGNOSIS — G43.009 MIGRAINE WITHOUT AURA AND WITHOUT STATUS MIGRAINOSUS, NOT INTRACTABLE: Primary | ICD-10-CM

## 2025-06-02 PROCEDURE — 99999 PR PBB SHADOW E&M-EST. PATIENT-LVL III: CPT | Mod: PBBFAC,,, | Performed by: NURSE PRACTITIONER

## 2025-06-02 PROCEDURE — 99214 OFFICE O/P EST MOD 30 MIN: CPT | Mod: S$GLB,,, | Performed by: NURSE PRACTITIONER

## 2025-06-02 PROCEDURE — 3008F BODY MASS INDEX DOCD: CPT | Mod: CPTII,S$GLB,, | Performed by: NURSE PRACTITIONER

## 2025-06-02 PROCEDURE — 3075F SYST BP GE 130 - 139MM HG: CPT | Mod: CPTII,S$GLB,, | Performed by: NURSE PRACTITIONER

## 2025-06-02 PROCEDURE — 1159F MED LIST DOCD IN RCRD: CPT | Mod: CPTII,S$GLB,, | Performed by: NURSE PRACTITIONER

## 2025-06-02 PROCEDURE — G2211 COMPLEX E/M VISIT ADD ON: HCPCS | Mod: S$GLB,,, | Performed by: NURSE PRACTITIONER

## 2025-06-02 PROCEDURE — 3079F DIAST BP 80-89 MM HG: CPT | Mod: CPTII,S$GLB,, | Performed by: NURSE PRACTITIONER

## 2025-06-02 RX ORDER — SERTRALINE HYDROCHLORIDE 100 MG/1
100 TABLET, FILM COATED ORAL DAILY
Qty: 90 TABLET | Refills: 1 | Status: SHIPPED | OUTPATIENT
Start: 2025-06-02

## 2025-06-02 RX ORDER — GALCANEZUMAB 120 MG/ML
120 INJECTION, SOLUTION SUBCUTANEOUS
Qty: 1 ML | Refills: 11 | Status: SHIPPED | OUTPATIENT
Start: 2025-06-02

## 2025-06-02 RX ORDER — SUMATRIPTAN SUCCINATE 100 MG/1
TABLET ORAL
Qty: 27 TABLET | Refills: 3 | Status: SHIPPED | OUTPATIENT
Start: 2025-06-02

## 2025-06-02 NOTE — PROGRESS NOTES
Established Patient   SUBJECTIVE:  Patient ID: Kelly Thomason   Chief Complaint: Follow-up    History of Present Illness:  Kelly Thomason is a 44 y.o. female who presents to clinic alone for follow-up of headaches.     Recommendations made at last Office Visit on 4/23/2024:  - For migraine prevention - continue emgality 120 mg SQ monthly   - Offered to switch emgality to ajovy given longer half-life, she declined but will consider in the future   - For acute migraines - sumatriptan 100 mg   - Secondary treatment option - ubrelvy 100 mg   - refills provided   - Continue tracking headaches   - Anxiety - continue sertraline 100 mg daily, management per PCP - will avoid use of anti-depressants for migraine prevention  - RTC in 6-12 months or sooner if needed    06/02/2025 - Interval History:  Migraines have remained well controlled on emgality monthly for migraine prevention.  Wear off effect she was previously experiencing has resolved.  No more than 1-2 mild to moderate migraines per month, if that.  Overall, patient expresses satisfaction with current control.  Headaches are not interfering with life or function.  Does not wish to make adjustments to treatment plan at this time.     CURRENT REGIMEN:  PPX - emgality  Abortive - sumatriptan, ubrelvy 100 mg     Otherwise, information below is still accurate and current.     04/23/2024 - Interval History:  Migraines have been relatively stable, reports she has been waking up with a mild headache for the last month or so, headache will spontaneously resolve.  She has continued emgality 120 mg SQ monthly for migraine prevention.  She continues to feel the wear off effect to emgality in the week prior to when her next injection is due.  Has multiple migraines that week.  However, is nearly migraine free for 3 weeks after her injection.  At this time, she does not wish to make adjustments to her treatment plan.      Otherwise, information below is still accurate and  current.      05/05/2023 - Interval History:  Migraines have responded well to switch from aimovig to emgality.  Has not taken any ubrelvy in nearly 7 months.  She is for the most part migraine free until 3-4 days before she is due for her next injection, during this time she will have mild headaches.  She has been easily aborting her headaches with 1/2 tab sumatriptan 100 mg.  She is pleased with the current state of her migraines, does not wish to make adjustments to her treatment plan at this time.      Otherwise, information below is still accurate and current.      05/06/2022 - Interval History:  Migraines continue to be slightly more frequent than she would like over the last few months.  She typically experiences 1-2 mild migraines per week.  Lasting only hours not all day in duration.  She has continued using Aimovig 140 mg SQ monthly for migraine prevention, which she does feel is helping in that her migraines are no where near as bad as they were in the past.  She feels the benefits of Aimovig are gradually diminishing, would like to consider alternative treatment options.      Otherwise, information below is still accurate and current.      02/04/2022 - Interval History:  Generally speaking migraines have remained well controlled over the last 6 months.  Did experience a slight uptick in migraine frequency in December, sent message via patient portal stating sumatriptan/naproxen had not been working, was given new prescription for ubrelvy 100 mg tabs, unfortunately it took sometime for insurance to approve ubrelvy, by the time she picked up prescription from pharmacy her migraine had subsided.  She has not tried ubrelvy yet as for all subsequent migraines sumatriptan/naproxen has been effective.  Migraines returned to baseline frequency in January. Reports she is currently suffering with 3-4 headaches per month, migraines she does experience are not debilitating, she does not consider migraines to be  severe.  More often than not sumatriptan 100 mg + naproxen is effective in aborting her migraines.       Otherwise, information below is still accurate and current.      08/06/2021 - Interval History:  She has been suffering with a cold, sinus congestion, coughing for the last 3 weeks, which has subsequently triggered headaches and pressure.   Outside of the last few weeks, migraines have been well controlled.  She has continued using Aimovig 140 mg SQ monthly for migraine prevention which has been helping.  Treats acute migraines with sumatriptan 100 mg tabs.  She is pleased with the current state of her migraines, does not wish to make adjustments to her treatment plan at this time.      Otherwise, information below is still accurate and current.      06/25/2021 - Interval History:  Aimovig was increased to 140 mg dose on 5/7/21 due to increased frequency of migraines.  She does feel increasing her dose of Aimovig has helped some, however the increased heat seems to trigger more frequent migraines.  She has been focusing on increasing her hydration.  Currently suffering with a migraine once every 1-2 weeks.  Feels migraines triggered by heat are more difficult to treat.  Typically 3-4 migraines per month.  Treats acute migraines with sumatriptan 100 mg tabs, which helps, however can have migraines persist for more than 1 day in duration.     Otherwise, information below is still accurate and current.      02/12/2021 - Interval History:  Migraines continue to be well controlled on Aimovig 70 mg SQ monthly, she has begun to notice the week she is due for her injection she begins to get mild headaches, no full blown migraines which she is very pleased about!  Despite experiencing mild headaches in the days leading up to her next dose of aimovig, she does not wish to make any adjustments to her treatment plan at this time.       Otherwise, information below is still accurate and current.      12/02/2019 - Interval  "History:  Migraines are "great", has only been experiencing 1-2 migraines per month, most of which are resolved by excedrin, occasionally has to take sumatriptan.  She picked up a 3 month supply of sumatriptan last January and still has 10 pills left!  She has been using Aimovig 70 mg SQ monthly, which has been very effective. She does experiencing itching to the injection site, which is relieved by ice.  She is very happy with the current state of her migraines and does not wish to make any further adjustments to her treatment plan at this time.       Otherwise, information below is still accurate and current.      01/03/2019 - Interval History:  Headaches and migraines have begun to occur more frequently, despite Botox injections, she no longer feels Botox is helping her migraines and wants to discuss changing her migraine prevention.  She has tried multiple oral medications as well as Botox without significant relief.  She is interested in trying a CGRP inhibitor for migraine prevention as well as discontinuing Botox injections. Migraines continue to feel the same as they always have, she denies any change in the quality or nature of her migraines.       11/13/2018- Interval History:   presents to clinic for third round of Botox, after her second round of Botox she noticed a decrease in the frequency of her migraines, admits she has been under increased stress over the last few weeks and migraines have not been significantly more frequent, which they would have been prior to beginning Botox injections.  Prior to beginning Botox, she was suffering with 1-2 debilitating migraines per week, over the last twelve weeks she feels she has only had 1-2 debilitating migraines and "a couple" moderate to severe migraines.  Sumatriptan continues to give her good relief, on only one occasion sumatriptan has not been effective following the first dose.  She has questions regarding alternative treatment options including " Aimovig, Frova and Cambia.  Discussed with regards to Aimovig, I would prefer to have her continue with Botox injections, as they have been quite effective in minimizing the burden of her migraines, for at least 3 rounds.  If after round 3 she would like to stop Botox and consider alternative options I would be agreeable to this plan.  I would not recommend her use Frova for migraine abortive as the onset of action is quite slow and her migraines intensify to peak fairly quickly.  Cambia could be used to supplement Sumatriptan in place of Naprosyn, however after learning Cambia is similar to Naprosyn, she feels more comfortable with continuing with use of Naprosyn.       08/27/2018- Interval History:  She does feel migraine frequency has decreased after first dose of Botox, also feels migraines are more responsive to medication.  She denies any presence of side effects from Botox injections and wishes to continue with treatment at this time.      06/04/2018- Interval History:  Zomig did not give her any relief, Sumatriptan gives her more relief.  Prednisone did help slightly to break up headache cycle.  She has only been taking Sumatriptan 1-2 times per week.  She continues to state she does not wish to add daily medication to prevention.  Headaches have not changed in nature or quality.  Risks, Benefits, and potential side effects of Botox discussed with patient.  Alternative treatments offered.  After thorough discussed regarding the procedure, patient has decided to move forward with initiating Botox injections for Chronic Migraine.  Patient understands we will complete 3 rounds of injections, if after 3 rounds, we do not see a 50% reduction in headaches, we will discontinue Botox injections.      04/23/2018 - Interval History:  At last appointment with Dr. Holm in January 2017, it was suggested  take 1/2 tab Sumatriptan 100 mg with 1 1/2 tabs aleve for migraine abortive.  For a while her migraines had  been great, states she only rarely expereinced any migraines during pregnancy as well as while she was breast feeding.  She stopped breast feeding last summer and migraines have since gradually become more frequent.  She reports having a headache nearly everyday with full blown migraines occurring 1-2 times per week, each lasting a full day.  She has tried using sumatriptan with aleve, however she does not find this effective.  She has suffered from migraines since she was 14 years old, she denies any change in the quality or nature of her headaches, this is how they have always been, just becoming more frequent.  Migraine is described as a pounding pain typically located behind her eye (can be either right or left sided) and temple, radiating into the neck.  Denies presence of aura symptoms.  Migraines are associated with nausea, photophobia, phonophobia.  Worsened with movement/activity.  Rates pain from 6-7 out of 10.  Additionally, she has noticed when she gets a migraine while on her menstrual cycle, the intensity is much worse, headache is harder to break, and duration is significantly longer, these headaches come the week before her menstrual cycle.  She is currently on Zoloft for prevention, but has tried numerous preventive and abortive options in the past (listed below).    Additionally, she has secondary complaints of intermittent lateral double vision which only occurs when she is either driving or watching tv.  She has noticed it typically only occurs after she has been watching tv for a while or has been driving for a good bit.  She believes the double vision resolves after she gets out of her car or walks away from the television but is unsure.  She has seen her optometrist who did not find any ocular cause for these symptoms.  She feels this has been going on for over a year.  When she gets double vision, if she closes on of her eyes these symptoms will resolve.      Notes from   [facility]:  History: Patient presents for follow-up for her migraine headaches.  Insurance will no longer cover Treximet, so she would like to have sumatriptan prescription and be able to add Aleve to it.  She typically takes half tablet of the Treximet.  We will give her the sumatriptan 100 mg which she can take half a tablet of in 1-1-1/2 Aleve with it.  Patient had been pregnant with no headaches for a while.  She has had increased frequency in the last 6 months with 2-3 headaches per month with good response to the Treximet.  She had tried that Cephaly  device but complains she doesn't have time to 2 minutes with a 2-year-old.  Previous note: 10-3-13: This is a 33-year-old white female who presents for followup for her migraine headaches. Patient was previously seen by Dr. Nicolle Chatman who is no longer here. Patient describes a 20 year history of migraine headaches. She describes right or left temporal pain radiating to the hand eye causing aching/deep pain in the eye radiating into the neck. Headache intensity is 5-6/10 associated with nausea, photophobia, sonophobia and lasting all day. The patient has no aura. She generally takes a quarter tablet of Treximet with relief in 30-60 minutes. She takes Cymbalta 30 mg and nortriptyline 25 mg h.s. for migraine prevention. Overall she has been doing quite well with this medication regimen and is here predominantly to establish with a new neurologist.       Therapies Tried & Response:   Pamelor - unsure   Treximet - helped   Cefaly -   Sumatriptan -   Cymbalta - unsure  Zoloft -   Tramadol -   Excedrin -   Effexor - unsure   Topiramate - unsure, cannot try again hx of kidney stone  Zomig NS -   Zomig PO -   Lamictal -   Elavil - weight gain   Magnesium -   relpax -   Paxil - no   Midrin - ?  Darvocet -   Nadolol  Prednisone -    Baclofen -   Zomig PO -   Botox -helping   Aimovig - effective, losing efficacy    Ubrelvy -   emgality - helping     Current  Medications:  Current Medications[1]    Review of Systems - A review of 10+ systems was conducted with pertinent positive and negative findings documented in HPI with all other systems reviewed and negative.    PFSH: Past medical, family, and social history reviewed as documented in chart with pertinent positive medical, family, and social history detailed in HPI.    OBJECTIVE:  Vitals:  /85 (Patient Position: Sitting)   Pulse 76   Wt 83.2 kg (183 lb 6.8 oz)   BMI 34.66 kg/m²      Physical Exam:  Constitutional: she appears well-developed and well-nourished. she is well groomed. NAD    Review of Data:   Notes from internal medicine, OBGYN reviewed   Labs:  Hospital Outpatient Visit on 04/17/2025   Component Date Value Ref Range Status    85% Max Predicted HR 04/17/2025 149   Final    Max Predicted HR 04/17/2025 175   Final    [facility] CV CPX PATIENT IS MALE 04/17/2025 0.0   Final    [facility] CV CPX PATIENT IS FEMALE 04/17/2025 1.0   Final    HR at rest 04/17/2025 85  bpm Final    Systolic blood pressure 04/17/2025 140  mmHg Final    Diastolic blood pressure 04/17/2025 86  mmHg Final    RPP 04/17/2025 11,900   Final    Exercise duration (min) 04/17/2025 6  minutes Final    Exercise duration (sec) 04/17/2025 29  seconds Final    Peak HR 04/17/2025 166  bpm Final    Peak Systolic BP 04/17/2025 203  mmHg Final    Peak Diatolic BP 04/17/2025 94  mmHg Final    Peak RPP 04/17/2025 33,698   Final    Estimated METs 04/17/2025 10   Final    % Max HR Achieved 04/17/2025 100   Final    1 Minute Recovery HR 04/17/2025 139  bpm Final   Lab Visit on 04/10/2025   Component Date Value Ref Range Status    Sodium 04/10/2025 141  136 - 145 mmol/L Final    Potassium 04/10/2025 4.5  3.5 - 5.1 mmol/L Final    Chloride 04/10/2025 109  95 - 110 mmol/L Final    CO2 04/10/2025 26  23 - 29 mmol/L Final    Glucose 04/10/2025 85  70 - 110 mg/dL Final    BUN 04/10/2025 13  6 - 20 mg/dL Final    Creatinine 04/10/2025 0.8  0.5 - 1.4 mg/dL Final     Calcium 04/10/2025 9.5  8.7 - 10.5 mg/dL Final    Protein Total 04/10/2025 7.3  6.0 - 8.4 gm/dL Final    Albumin 04/10/2025 3.8  3.5 - 5.2 g/dL Final    Bilirubin Total 04/10/2025 0.5  0.1 - 1.0 mg/dL Final    ALP 04/10/2025 98  40 - 150 unit/L Final    AST 04/10/2025 21  11 - 45 unit/L Final    ALT 04/10/2025 27  10 - 44 unit/L Final    Anion Gap 04/10/2025 6 (L)  8 - 16 mmol/L Final    eGFR 04/10/2025 >60  >60 mL/min/1.73/m2 Final    WBC 04/10/2025 6.59  3.90 - 12.70 K/uL Final    RBC 04/10/2025 4.65  4.00 - 5.40 M/uL Final    HGB 04/10/2025 14.1  12.0 - 16.0 gm/dL Final    HCT 04/10/2025 43.0  37.0 - 48.5 % Final    MCV 04/10/2025 93  82 - 98 fL Final    MCH 04/10/2025 30.3  27.0 - 31.0 pg Final    MCHC 04/10/2025 32.8  32.0 - 36.0 g/dL Final    RDW 04/10/2025 13.0  11.5 - 14.5 % Final    Platelet Count 04/10/2025 320  150 - 450 K/uL Final    MPV 04/10/2025 9.7  9.2 - 12.9 fL Final    Nucleated RBC 04/10/2025 0  <=0 /100 WBC Final    Neut % 04/10/2025 61.7  38 - 73 % Final    Lymph % 04/10/2025 26.4  18 - 48 % Final    Mono % 04/10/2025 9.3  4 - 15 % Final    Eos % 04/10/2025 1.8  <=8 % Final    Basophil % 04/10/2025 0.5  <=1.9 % Final    Imm Grans % 04/10/2025 0.3  0.0 - 0.5 % Final    Neut # 04/10/2025 4.07  1.8 - 7.7 K/uL Final    Lymph # 04/10/2025 1.74  1 - 4.8 K/uL Final    Mono # 04/10/2025 0.61  0.3 - 1 K/uL Final    Eos # 04/10/2025 0.12  <=0.5 K/uL Final    Baso # 04/10/2025 0.03  <=0.2 K/uL Final    Imm Grans # 04/10/2025 0.02  0.00 - 0.04 K/uL Final   Office Visit on 04/10/2025   Component Date Value Ref Range Status    QRS Duration 04/10/2025 86  ms Final    [facility] QTC Calculation 04/10/2025 467  ms Final   Office Visit on 04/02/2025   Component Date Value Ref Range Status    Case Report 04/02/2025    Final                    Value:[facility]                                Case: [id number]                                Authorizing Provider:  Paola Tony NP       Collected:            04/02/2025 03:08 PM          Ordering Location:     [facility]              Received:            04/03/2025 09:04 AM          First Screen:          Kristen Mayfield CT(ASCP)                                                                     Pathologist:           Sheldon Ellis MD                                                         Specimen:    Liquid-Based Pap Test, Screening, Cervix                                                   Clinical Findings 04/02/2025    Final                    Value:Routine    Specimen Adequacy 04/02/2025 Satisfactory for interpretation. Endocervical component is absent   Final    Final Diagnostic Interpretation 04/02/2025 Negative for intraepithelial lesion or malignancy    Final    [place] Category 04/02/2025 Negative for intraepithelial lesion or malignancy   Final    Additional Findings 04/02/2025 Keratotic cellular changes, hyperkeratosis    Final    LMP Date 04/02/2025    Final                    Value:nexplanon     04/02/2025    Final                    Value:No    Contraceptives 04/02/2025    Final                    Value:Other    Estrogen replacement therapy 04/02/2025    Final                    Value:No    Prior treatment 04/02/2025    Final                    Value:No    Previous Bx 04/02/2025    Final                    Value:Yes    Disclaimer 04/02/2025    Final                    Value:The Pap smear is a screening test that aids in the detection of cervical cancer and cancer precursors. Both false positive and false negative results can occur. The test should be used at regular intervals, and positive results should be confirmed before definitive therapy.    This liquid based specimen is processed using the , , or Unveil ThinPrep PAP System. This specimen has been analyzed by the Kid[employer]Prep Imaging System (Probiodrug), an automated imaging and review  system which assists the laboratory in evaluating cells on ThinPrep PAP tests. Following automated imaging, selected fields from every slide are reviewed by a cytologist and/or pathologist.         Performing Location 04/02/2025    Final                    Value:Screening performed at [facility], [address] [place] [address].    Sign out performed at [facility], [address] [place] [address]        HPV High Risk Type 16, PCR 04/02/2025 Negative  Negative Final    HPV High Risk Type 18, PCR 04/02/2025 Negative  Negative Final    HPV High Risk Type Other, PCR 04/02/2025 Negative  Negative Final     Imaging:  No results found for this or any previous visit.  Note: I have independently reviewed any/all imaging/labs/tests and agree with the report (s) as documented.  Any discrepancies will be as noted/demarcated by free text.  ARMAAN URIBE 6/2/2025    ASSESSMENT:  1. Migraine without aura and without status migrainosus, not intractable    2. Anxiety      PLAN:  - For migraine prevention - continue emgality 120 mg SQ monthly   - For acute migraines - sumatriptan 100 mg   - Secondary treatment option - ubrelvy 100 mg   - refills provided   - Continue tracking headaches   - Anxiety - continue sertraline 100 mg daily, management per PCP - will avoid use of anti-depressants for migraine prevention  - RTC in 1 year or sooner if needed         Questions and concerns were sought and answered to the patient's stated verbal satisfaction.  The patient verbalizes understanding and agreement with the above stated treatment plan.     Visit today included increased complexity associated with the care of the episodic problem migraine without aura addressed and managing the longitudinal care of the patient due to the serious and/or complex managed problem(s). Plan for patient to return to clinic in 12 months for follow-up visit.      CC: Christiano Hurst MD Elizabeth C. Vulevich, FNP-C  [facility]   [phone]    Dr. Perez was available during today's encounter.            [1]   Current Outpatient Medications:     galcanezumab-gnlm (EMGALITY PEN) 120 mg/mL PnIj, Inject 120 mg into the skin every 28 days. Begin 28 days after loading dose., Disp: 1 mL, Rfl: 0    omeprazole (PRILOSEC) 40 MG capsule, TAKE 1 CAPSULE (40 MG TOTAL) BY MOUTH EVERY MORNING., Disp: 90 capsule, Rfl: 1    sertraline (ZOLOFT) 100 MG tablet, TAKE 1 TABLET BY MOUTH ONCE  DAILY, Disp: 90 tablet, Rfl: 0    sumatriptan (IMITREX) 100 MG tablet, Take 1 tablet at onset of migraine, can repeat in 2 hrs if needed. Max 2 tabs/day. Max 3 days/week., Disp: 27 tablet, Rfl: 3    ubrogepant (UBRELVY) 100 mg tablet, Take 1 tablet (100 mg total) by mouth every 2 (two) hours as needed for Migraine. Max 200 mg/day., Disp: 10 tablet, Rfl: 5    Current Facility-Administered Medications:     etonogestreL subdermal device 68 mg, 68 mg, Implant, , Paola Tony NP, 68 mg at 05/02/23 9836

## 2025-07-07 ENCOUNTER — PATIENT MESSAGE (OUTPATIENT)
Dept: INTERNAL MEDICINE | Facility: CLINIC | Age: 45
End: 2025-07-07
Payer: COMMERCIAL

## 2025-07-07 DIAGNOSIS — S92.332A DISPLACED FRACTURE OF THIRD METATARSAL BONE, LEFT FOOT, INITIAL ENCOUNTER FOR CLOSED FRACTURE: Primary | ICD-10-CM

## 2025-07-08 NOTE — TELEPHONE ENCOUNTER
LOV with Christiano Hurst MD , 4/10/2025    Pt states she fell at work and fx her left foot. Xray attached. Pt states she was told to f/u with PCP or ortho. Pended referral for ortho if appropriate. Please let me know if you prefer to see pt in clinic first

## 2025-07-16 ENCOUNTER — OFFICE VISIT (OUTPATIENT)
Dept: ORTHOPEDICS | Facility: CLINIC | Age: 45
End: 2025-07-16
Payer: COMMERCIAL

## 2025-07-16 ENCOUNTER — HOSPITAL ENCOUNTER (OUTPATIENT)
Dept: RADIOLOGY | Facility: HOSPITAL | Age: 45
Discharge: HOME | End: 2025-07-16
Attending: PHYSICIAN ASSISTANT
Payer: COMMERCIAL

## 2025-07-16 DIAGNOSIS — S92.355A CLOSED NONDISPLACED FRACTURE OF FIFTH METATARSAL BONE OF LEFT FOOT, INITIAL ENCOUNTER: ICD-10-CM

## 2025-07-16 DIAGNOSIS — S92.335A CLOSED NONDISPLACED FRACTURE OF THIRD METATARSAL BONE OF LEFT FOOT, INITIAL ENCOUNTER: ICD-10-CM

## 2025-07-16 DIAGNOSIS — S92.332A DISPLACED FRACTURE OF THIRD METATARSAL BONE, LEFT FOOT, INITIAL ENCOUNTER FOR CLOSED FRACTURE: ICD-10-CM

## 2025-07-16 DIAGNOSIS — S92.902A MULTIPLE CLOSED FRACTURES OF LEFT FOOT, INITIAL ENCOUNTER: Primary | ICD-10-CM

## 2025-07-16 DIAGNOSIS — S92.345A CLOSED NONDISPLACED FRACTURE OF FOURTH METATARSAL BONE OF LEFT FOOT, INITIAL ENCOUNTER: ICD-10-CM

## 2025-07-16 PROCEDURE — 99999 PR PBB SHADOW E&M-EST. PATIENT-LVL III: CPT | Mod: PBBFAC,,, | Performed by: PHYSICIAN ASSISTANT

## 2025-07-16 PROCEDURE — 1159F MED LIST DOCD IN RCRD: CPT | Mod: CPTII,S$GLB,, | Performed by: PHYSICIAN ASSISTANT

## 2025-07-16 PROCEDURE — 73630 X-RAY EXAM OF FOOT: CPT | Mod: TC,LT

## 2025-07-16 PROCEDURE — 99203 OFFICE O/P NEW LOW 30 MIN: CPT | Mod: S$GLB,,, | Performed by: PHYSICIAN ASSISTANT

## 2025-07-16 PROCEDURE — 73630 X-RAY EXAM OF FOOT: CPT | Mod: 26,LT,, | Performed by: RADIOLOGY

## 2025-07-16 NOTE — PROGRESS NOTES
[facility]  Orthopedic Surgery  Clinic Note      Subjective:   History of Present Illness    CHIEF COMPLAINT:  Patient presents today for left foot fracture after a fall.    HISTORY OF PRESENT ILLNESS:  She sustained an ankle injury on July 6th when she fell, shifting onto the affected foot causing her ankle to roll. She applied ice immediately after the injury and sought care at urgent care the following morning (July 7th), where she was referred for diagnostic imaging and had XRs that confirmed multiple foot fracture.     CURRENT TREATMENT:  She was provided with CAM boot, but it is uncomfortable. She uses a knee scooter for mobility and wears a larger size shoe to accommodate the injury. She has been using a Coban wrap at night for compression and applies ice 3-4 times daily. She has only been wearing a tennis shoe for the past two days.     MEDICATIONS:  She takes prescription-strength Aleve twice daily, adjusting dosage based on activity level and attempting to wean down to one tablet.       ROS:  Musculoskeletal: +joint pain, -muscle pain, +pain with movement, +limb pain, +shooting pain sensation        Medications: I have reviewed medication list in the chart at the time of this encounter.     Review of patient's allergies indicates:   Allergen Reactions    Percocet [oxycodone-acetaminophen] Other (See Comments)     Caused mobility problems      Past Medical History:   Diagnosis Date    Abnormal Pap smear of cervix     Depression     Head ache     Hives     Kidney stones     Urticaria      Past Surgical History:   Procedure Laterality Date    COLONOSCOPY N/A 9/26/2024    Procedure: COLONOSCOPY;  Surgeon: Hollie Lira MD;  Location: [facility] ([address] [place] [address]);  Service: Endoscopy;  Laterality: N/A;  2nd floor- Hx of difficult intubation   Referral:  Savana Schwarz PA-C  [facility] portal    9/20- no answer on precall, LVM, KL, OC    KIDNEY STONE SURGERY      LAPAROSCOPIC CHOLECYSTECTOMY N/A  01/25/2023    Procedure: CHOLECYSTECTOMY, LAPAROSCOPIC;  Surgeon: Eliecer Gong MD;  Location: [facility] OR [address] [place] [address];  Service: General;  Laterality: N/A;  consent in am    WISDOM TOOTH EXTRACTION         Objective:     Physical Exam  Cardiovascular:      Pulses:           Radial pulses are 2+ on the left side.   Musculoskeletal:      Right knee: No effusion or bony tenderness.      Left knee: No effusion or bony tenderness.      Right ankle: No swelling or deformity. No tenderness. Normal range of motion.      Left ankle: No swelling or deformity. No tenderness. Normal range of motion.      Left foot: Normal range of motion. Swelling, tenderness and bony tenderness present.   Feet:      Left foot:      Skin integrity: No ulcer, blister, skin breakdown, erythema, warmth, callus, dry skin or fissure.      Toenail Condition: Left toenails are normal.          Right Knee Exam     Other   Effusion: no effusion present      Left Knee Exam     Other   Effusion: no effusion present             Imaging:  I have independently interpreted and reviewed L foot XR obtained today in clinic with patient.    Her results from previous UC visit:    Assessment:       1. Multiple closed fractures of left foot, initial encounter    2. Closed nondisplaced fracture of third metatarsal bone of left foot, initial encounter    3. Closed nondisplaced fracture of fourth metatarsal bone of left foot, initial encounter    4. Closed nondisplaced fracture of fifth metatarsal bone of left foot, initial encounter       Plan:       Orders Placed This Encounter    HME - OTHER    X-Ray Foot Complete Left    POCT Apply ace wrap        Assessment & Plan    IMPRESSION:  - Diagnosed non-displaced fractures of the 3rd, 4th, and 5th metatarsal head and necks based on XR findings today. I do not see a 2nd metatarsal fracture.   - Determined fractures are inoperable, requiring conservative management.  - Recommend post-op shoe for 4 weeks to prevent  extension of toes and direct pressure on the ball of the foot; patient to wear as much as possible, including at home.  - Advised RICE therapy and naproxen bid PRN pain management and healing.    PATIENT EDUCATION:  - Explained XR findings, showing fractures in the foot bones (metatarsals) rather than the toes.  - Informed patient about expected pain in other parts of extremity due to abnormal walking patterns.  - Discussed that pain, swelling, and bruising may take 4 weeks or more to fully resolve.    ACTION ITEMS/LIFESTYLE:  - Patient to walk on heels when not wearing the shoe for short distances at home.  - Recommend elevating foot above heart level, especially when sleeping.  - Patient to apply ice for 20 minutes every 4 hours, particularly on bad pain days.  - Recommend using compression with an Ace wrap for swelling management. Ace applied here.  - Patient to begin range of motion exercises (toe curling, fanning, ankle movements) when pain decreases significantly.    MEDICATIONS:  - Continued naproxen 500 mg every 12 hours as needed for pain and inflammation, to be taken after eating to prevent gastritis; may supplement with Tylenol if wanting to avoid consecutive days of naproxen use.    ORDERS:  - Provided post-op shoe for patient use.    FOLLOW UP:  - Follow up in 4 weeks for re-evaluation and new XRs.  - Contact office if condition changes.         Future Appointments   Date Time Provider Department Center   7/23/2025  8:30 AM Savana Schwarz PA-C [facility]   8/13/2025  2:30 PM [facility] Stephens PA-C  Orthopedic Surgery  [facility]

## 2025-07-28 ENCOUNTER — LAB VISIT (OUTPATIENT)
Dept: LAB | Facility: HOSPITAL | Age: 45
End: 2025-07-28
Payer: COMMERCIAL

## 2025-07-28 ENCOUNTER — OFFICE VISIT (OUTPATIENT)
Dept: INTERNAL MEDICINE | Facility: CLINIC | Age: 45
End: 2025-07-28
Payer: COMMERCIAL

## 2025-07-28 VITALS
WEIGHT: 182.56 LBS | HEIGHT: 61 IN | BODY MASS INDEX: 34.47 KG/M2 | OXYGEN SATURATION: 98 % | SYSTOLIC BLOOD PRESSURE: 130 MMHG | DIASTOLIC BLOOD PRESSURE: 82 MMHG | HEART RATE: 84 BPM

## 2025-07-28 DIAGNOSIS — Z00.00 ROUTINE PHYSICAL EXAMINATION: Primary | ICD-10-CM

## 2025-07-28 DIAGNOSIS — Z00.00 ROUTINE PHYSICAL EXAMINATION: ICD-10-CM

## 2025-07-28 LAB
ABSOLUTE EOSINOPHIL (OHS): 0.13 K/UL
ABSOLUTE MONOCYTE (OHS): 0.49 K/UL (ref 0.3–1)
ABSOLUTE NEUTROPHIL COUNT (OHS): 3.9 K/UL (ref 1.8–7.7)
ALBUMIN SERPL BCP-MCNC: 3.9 G/DL (ref 3.5–5.2)
ALP SERPL-CCNC: 102 UNIT/L (ref 40–150)
ALT SERPL W/O P-5'-P-CCNC: 23 UNIT/L (ref 0–55)
ANION GAP (OHS): 10 MMOL/L (ref 8–16)
AST SERPL-CCNC: 24 UNIT/L (ref 0–50)
BASOPHILS # BLD AUTO: 0.03 K/UL
BASOPHILS NFR BLD AUTO: 0.5 %
BILIRUB SERPL-MCNC: 0.5 MG/DL (ref 0.1–1)
BUN SERPL-MCNC: 11 MG/DL (ref 6–20)
CALCIUM SERPL-MCNC: 9 MG/DL (ref 8.7–10.5)
CHLORIDE SERPL-SCNC: 105 MMOL/L (ref 95–110)
CHOLEST SERPL-MCNC: 185 MG/DL (ref 120–199)
CHOLEST/HDLC SERPL: 3.4 {RATIO} (ref 2–5)
CO2 SERPL-SCNC: 23 MMOL/L (ref 23–29)
CREAT SERPL-MCNC: 0.8 MG/DL (ref 0.5–1.4)
EAG (OHS): 100 MG/DL (ref 68–131)
ERYTHROCYTE [DISTWIDTH] IN BLOOD BY AUTOMATED COUNT: 12.6 % (ref 11.5–14.5)
GFR SERPLBLD CREATININE-BSD FMLA CKD-EPI: >60 ML/MIN/1.73/M2
GLUCOSE SERPL-MCNC: 91 MG/DL (ref 70–110)
HBA1C MFR BLD: 5.1 % (ref 4–5.6)
HCT VFR BLD AUTO: 44 % (ref 37–48.5)
HDLC SERPL-MCNC: 54 MG/DL (ref 40–75)
HDLC SERPL: 29.2 % (ref 20–50)
HGB BLD-MCNC: 14.4 GM/DL (ref 12–16)
IMM GRANULOCYTES # BLD AUTO: 0.02 K/UL (ref 0–0.04)
IMM GRANULOCYTES NFR BLD AUTO: 0.3 % (ref 0–0.5)
LDLC SERPL CALC-MCNC: 100.8 MG/DL (ref 63–159)
LYMPHOCYTES # BLD AUTO: 1.86 K/UL (ref 1–4.8)
MCH RBC QN AUTO: 30.3 PG (ref 27–31)
MCHC RBC AUTO-ENTMCNC: 32.7 G/DL (ref 32–36)
MCV RBC AUTO: 93 FL (ref 82–98)
NONHDLC SERPL-MCNC: 131 MG/DL
NUCLEATED RBC (/100WBC) (OHS): 0 /100 WBC
PLATELET # BLD AUTO: 317 K/UL (ref 150–450)
PMV BLD AUTO: 9.6 FL (ref 9.2–12.9)
POTASSIUM SERPL-SCNC: 4.4 MMOL/L (ref 3.5–5.1)
PROT SERPL-MCNC: 7.1 GM/DL (ref 6–8.4)
RBC # BLD AUTO: 4.75 M/UL (ref 4–5.4)
RELATIVE EOSINOPHIL (OHS): 2 %
RELATIVE LYMPHOCYTE (OHS): 28.9 % (ref 18–48)
RELATIVE MONOCYTE (OHS): 7.6 % (ref 4–15)
RELATIVE NEUTROPHIL (OHS): 60.7 % (ref 38–73)
SODIUM SERPL-SCNC: 138 MMOL/L (ref 136–145)
TRIGL SERPL-MCNC: 151 MG/DL (ref 30–150)
TSH SERPL-ACNC: 3.19 UIU/ML (ref 0.4–4)
WBC # BLD AUTO: 6.43 K/UL (ref 3.9–12.7)

## 2025-07-28 PROCEDURE — 3075F SYST BP GE 130 - 139MM HG: CPT | Mod: CPTII,S$GLB,, | Performed by: PHYSICIAN ASSISTANT

## 2025-07-28 PROCEDURE — 36415 COLL VENOUS BLD VENIPUNCTURE: CPT

## 2025-07-28 PROCEDURE — 84443 ASSAY THYROID STIM HORMONE: CPT

## 2025-07-28 PROCEDURE — 3079F DIAST BP 80-89 MM HG: CPT | Mod: CPTII,S$GLB,, | Performed by: PHYSICIAN ASSISTANT

## 2025-07-28 PROCEDURE — 3044F HG A1C LEVEL LT 7.0%: CPT | Mod: CPTII,S$GLB,, | Performed by: PHYSICIAN ASSISTANT

## 2025-07-28 PROCEDURE — 99999 PR PBB SHADOW E&M-EST. PATIENT-LVL IV: CPT | Mod: PBBFAC,,, | Performed by: PHYSICIAN ASSISTANT

## 2025-07-28 PROCEDURE — 1159F MED LIST DOCD IN RCRD: CPT | Mod: CPTII,S$GLB,, | Performed by: PHYSICIAN ASSISTANT

## 2025-07-28 PROCEDURE — 3008F BODY MASS INDEX DOCD: CPT | Mod: CPTII,S$GLB,, | Performed by: PHYSICIAN ASSISTANT

## 2025-07-28 PROCEDURE — 85025 COMPLETE CBC W/AUTO DIFF WBC: CPT

## 2025-07-28 PROCEDURE — 83718 ASSAY OF LIPOPROTEIN: CPT

## 2025-07-28 PROCEDURE — 82374 ASSAY BLOOD CARBON DIOXIDE: CPT

## 2025-07-28 PROCEDURE — 99396 PREV VISIT EST AGE 40-64: CPT | Mod: S$GLB,,, | Performed by: PHYSICIAN ASSISTANT

## 2025-07-28 PROCEDURE — 1160F RVW MEDS BY RX/DR IN RCRD: CPT | Mod: CPTII,S$GLB,, | Performed by: PHYSICIAN ASSISTANT

## 2025-07-28 PROCEDURE — 83036 HEMOGLOBIN GLYCOSYLATED A1C: CPT

## 2025-07-28 NOTE — PROGRESS NOTES
"Subjective     Patient ID: Kelly Thomason is a 44 y.o. female with PMH of migraines, depression, and kidney stones     Chief Complaint: Annual Exam    HPI    Established pt of Christiano Hurst MD     Here for annual exam.     Overall doing well.     Recovering from left foot fx that occurred about 3 weeks ago while on vacation at the beach, following with Ortho    Migraines stable with current meds, followed by neurology       Past Medical History:   Diagnosis Date    Abnormal Pap smear of cervix     Depression     Head ache     Hives     Kidney stones     Urticaria        Social History[1]    Review of patient's allergies indicates:   Allergen Reactions    Percocet [oxycodone-acetaminophen] Other (See Comments)     Caused mobility problems       Review of Systems   Constitutional:  Negative for chills and diaphoresis.   HENT:  Positive for postnasal drip.    Respiratory:  Negative for cough and shortness of breath.    Cardiovascular:  Negative for chest pain and leg swelling.   Gastrointestinal:  Negative for nausea and vomiting.   Integumentary:  Negative for rash.          Objective  /82 (BP Location: Left arm, Patient Position: Sitting)   Pulse 84   Ht 5' 1" (1.549 m)   Wt 82.8 kg (182 lb 8.7 oz)   SpO2 98%   BMI 34.49 kg/m²       Physical Exam  Vitals reviewed.   Constitutional:       General: She is not in acute distress.     Appearance: She is well-developed. She is not ill-appearing.   HENT:      Head: Normocephalic and atraumatic.      Right Ear: Tympanic membrane, ear canal and external ear normal.      Left Ear: Tympanic membrane, ear canal and external ear normal.   Cardiovascular:      Rate and Rhythm: Normal rate and regular rhythm.      Heart sounds: No murmur heard.  Pulmonary:      Effort: Pulmonary effort is normal.      Breath sounds: Normal breath sounds. No wheezing or rales.   Abdominal:      General: Bowel sounds are normal.      Palpations: Abdomen is soft.      Tenderness: " There is no abdominal tenderness.   Musculoskeletal:      Right lower leg: No edema.      Left lower leg: No edema.   Skin:     General: Skin is warm and dry.      Findings: No rash.   Neurological:      Mental Status: She is alert and oriented to person, place, and time.   Psychiatric:         Mood and Affect: Mood normal.            Assessment and Plan     1. Routine physical examination  HM reviewed and updated   -     CBC Auto Differential; Future; Expected date: 07/28/2025  -     Comprehensive Metabolic Panel; Future; Expected date: 07/28/2025  -     TSH; Future; Expected date: 07/28/2025  -     Lipid Panel; Future; Expected date: 07/28/2025  -     Hemoglobin A1C; Future; Expected date: 07/28/2025      RTC in 1 yr for next annual or sooner if needed.       Savana Schwarz PA-C    Future Appointments   Date Time Provider Department Center   8/13/2025  2:30 PM Fernanda Stephens PA-C [facility]              [1]   Social History  Tobacco Use    Smoking status: Never     Passive exposure: Never    Smokeless tobacco: Never   Substance Use Topics    Alcohol use: Not Currently     Comment: Rare    Drug use: No

## 2025-08-13 ENCOUNTER — OFFICE VISIT (OUTPATIENT)
Dept: ORTHOPEDICS | Facility: CLINIC | Age: 45
End: 2025-08-13
Payer: COMMERCIAL

## 2025-08-13 ENCOUNTER — HOSPITAL ENCOUNTER (OUTPATIENT)
Dept: RADIOLOGY | Facility: HOSPITAL | Age: 45
Discharge: HOME | End: 2025-08-13
Attending: PHYSICIAN ASSISTANT
Payer: COMMERCIAL

## 2025-08-13 DIAGNOSIS — S92.345D CLOSED NONDISPLACED FRACTURE OF FOURTH METATARSAL BONE OF LEFT FOOT WITH ROUTINE HEALING, SUBSEQUENT ENCOUNTER: ICD-10-CM

## 2025-08-13 DIAGNOSIS — S92.902A MULTIPLE CLOSED FRACTURES OF LEFT FOOT, INITIAL ENCOUNTER: ICD-10-CM

## 2025-08-13 DIAGNOSIS — S92.902D CLOSED MULTIPLE FRACTURES OF LEFT FOOT WITH ROUTINE HEALING, SUBSEQUENT ENCOUNTER: Primary | ICD-10-CM

## 2025-08-13 DIAGNOSIS — S92.335D CLOSED NONDISPLACED FRACTURE OF THIRD METATARSAL BONE OF LEFT FOOT WITH ROUTINE HEALING, SUBSEQUENT ENCOUNTER: ICD-10-CM

## 2025-08-13 DIAGNOSIS — S92.355D CLOSED NONDISPLACED FRACTURE OF FIFTH METATARSAL BONE OF LEFT FOOT WITH ROUTINE HEALING, SUBSEQUENT ENCOUNTER: ICD-10-CM

## 2025-08-13 DIAGNOSIS — S92.325D CLOSED NONDISPLACED FRACTURE OF SECOND METATARSAL BONE OF LEFT FOOT WITH ROUTINE HEALING, SUBSEQUENT ENCOUNTER: ICD-10-CM

## 2025-08-13 PROCEDURE — 99213 OFFICE O/P EST LOW 20 MIN: CPT | Mod: S$GLB,,, | Performed by: PHYSICIAN ASSISTANT

## 2025-08-13 PROCEDURE — 73630 X-RAY EXAM OF FOOT: CPT | Mod: 26,LT,, | Performed by: RADIOLOGY

## 2025-08-13 PROCEDURE — 3044F HG A1C LEVEL LT 7.0%: CPT | Mod: CPTII,S$GLB,, | Performed by: PHYSICIAN ASSISTANT

## 2025-08-13 PROCEDURE — 99999 PR PBB SHADOW E&M-EST. PATIENT-LVL III: CPT | Mod: PBBFAC,,, | Performed by: PHYSICIAN ASSISTANT

## 2025-08-13 PROCEDURE — 1160F RVW MEDS BY RX/DR IN RCRD: CPT | Mod: CPTII,S$GLB,, | Performed by: PHYSICIAN ASSISTANT

## 2025-08-13 PROCEDURE — 73630 X-RAY EXAM OF FOOT: CPT | Mod: TC,LT

## 2025-08-13 PROCEDURE — 1159F MED LIST DOCD IN RCRD: CPT | Mod: CPTII,S$GLB,, | Performed by: PHYSICIAN ASSISTANT

## (undated) DEVICE — DRAPE HALF SURGICAL 40X58IN

## (undated) DEVICE — TROCAR ENDOPATH XCEL 12MM 10CM

## (undated) DEVICE — SCISSOR 5MMX35CM DIRECT DRIVE

## (undated) DEVICE — Device

## (undated) DEVICE — CANNULA ENDOPATH XCEL 5X100MM

## (undated) DEVICE — ADHESIVE DERMABOND ADVANCED

## (undated) DEVICE — BAG TISS RETRV MONARCH 10MM

## (undated) DEVICE — TUBING HF INSUFFLATION W/ FLTR

## (undated) DEVICE — SOL NS 1000CC

## (undated) DEVICE — TRAY MINOR GEN SURG OMC

## (undated) DEVICE — TROCAR ENDOPATH XCEL 5X100MM

## (undated) DEVICE — ELECTRODE REM PLYHSV RETURN 9

## (undated) DEVICE — BLADE SURG CARBON STEEL SZ11

## (undated) DEVICE — SUT GUT PL. 4-0 27 FS-2

## (undated) DEVICE — IRRIGATOR ENDOSCOPY DISP.

## (undated) DEVICE — CLIP HEMO-LOK ML

## (undated) DEVICE — NDL HYPO REG 25G X 1 1/2

## (undated) DEVICE — SUT 0 VICRYL / UR6 (J603)